# Patient Record
Sex: FEMALE | Race: WHITE | NOT HISPANIC OR LATINO | Employment: FULL TIME | URBAN - METROPOLITAN AREA
[De-identification: names, ages, dates, MRNs, and addresses within clinical notes are randomized per-mention and may not be internally consistent; named-entity substitution may affect disease eponyms.]

---

## 2023-03-07 LAB
CHLAMYDIA TRACH., AMPLIFIED: NEGATIVE
N. GONORRHEA, AMPLIFIED: NEGATIVE

## 2023-03-08 LAB — URINE CULTURE: NO GROWTH

## 2023-04-11 LAB
ERYTHROCYTE DISTRIBUTION WIDTH (RATIO) BY AUTOMATED COUNT: 13.4 % (ref 11.5–14.5)
ERYTHROCYTE MEAN CORPUSCULAR HEMOGLOBIN CONCENTRATION (G/DL) BY AUTOMATED: 32.6 G/DL (ref 32–36)
ERYTHROCYTE MEAN CORPUSCULAR VOLUME (FL) BY AUTOMATED COUNT: 91 FL (ref 80–100)
ERYTHROCYTES (10*6/UL) IN BLOOD BY AUTOMATED COUNT: 4.12 X10E12/L (ref 4–5.2)
HEMATOCRIT (%) IN BLOOD BY AUTOMATED COUNT: 37.4 % (ref 36–46)
HEMOGLOBIN (G/DL) IN BLOOD: 12.2 G/DL (ref 12–16)
LEUKOCYTES (10*3/UL) IN BLOOD BY AUTOMATED COUNT: 9.8 X10E9/L (ref 4.4–11.3)
PLATELETS (10*3/UL) IN BLOOD AUTOMATED COUNT: 285 X10E9/L (ref 150–450)
REFLEX ADDED, ANEMIA PANEL: NORMAL

## 2023-04-12 LAB
ABO GROUP (TYPE) IN BLOOD: NORMAL
ANTIBODY SCREEN: NORMAL
ESTIMATED AVERAGE GLUCOSE FOR HBA1C: 105 MG/DL
HEMOGLOBIN A1C/HEMOGLOBIN TOTAL IN BLOOD: 5.3 %
HEPATITIS B VIRUS SURFACE AG PRESENCE IN SERUM: NONREACTIVE
HEPATITIS C VIRUS AB PRESENCE IN SERUM: NONREACTIVE
HIV 1/ 2 AG/AB SCREEN: NONREACTIVE
RH FACTOR: NORMAL
RUBELLA VIRUS IGG AB: POSITIVE
SYPHILIS TOTAL AB: NONREACTIVE

## 2023-04-13 LAB
HEMOGLOBIN A2: 3 %
HEMOGLOBIN A: 96.6 %
HEMOGLOBIN F: 0.4 %
HEMOGLOBIN IDENTIFICATION INTERPRETATION: NORMAL
PATH REVIEW-HGB IDENTIFICATION: NORMAL

## 2023-07-22 LAB
ERYTHROCYTE DISTRIBUTION WIDTH (RATIO) BY AUTOMATED COUNT: 14.2 % (ref 11.5–14.5)
ERYTHROCYTE MEAN CORPUSCULAR HEMOGLOBIN CONCENTRATION (G/DL) BY AUTOMATED: 31.3 G/DL (ref 32–36)
ERYTHROCYTE MEAN CORPUSCULAR VOLUME (FL) BY AUTOMATED COUNT: 97 FL (ref 80–100)
ERYTHROCYTES (10*6/UL) IN BLOOD BY AUTOMATED COUNT: 3.65 X10E12/L (ref 4–5.2)
HEMATOCRIT (%) IN BLOOD BY AUTOMATED COUNT: 35.5 % (ref 36–46)
HEMOGLOBIN (G/DL) IN BLOOD: 11.1 G/DL (ref 12–16)
LEUKOCYTES (10*3/UL) IN BLOOD BY AUTOMATED COUNT: 11.6 X10E9/L (ref 4.4–11.3)
NRBC (PER 100 WBCS) BY AUTOMATED COUNT: 0 /100 WBC (ref 0–0)
PLATELETS (10*3/UL) IN BLOOD AUTOMATED COUNT: 271 X10E9/L (ref 150–450)
REFLEX ADDED, ANEMIA PANEL: ABNORMAL

## 2023-07-23 LAB — GLUCOSE, 1 HR SCREEN, PREG: 122 MG/DL

## 2023-08-24 PROBLEM — N92.1 BREAKTHROUGH BLEEDING ON NEXPLANON: Status: ACTIVE | Noted: 2023-08-24

## 2023-08-24 PROBLEM — Z97.5 BREAKTHROUGH BLEEDING ON NEXPLANON: Status: ACTIVE | Noted: 2023-08-24

## 2023-08-24 PROBLEM — L50.1 URTICARIA, IDIOPATHIC: Status: ACTIVE | Noted: 2023-08-24

## 2023-08-24 PROBLEM — F32.A ANXIETY AND DEPRESSION: Status: ACTIVE | Noted: 2023-08-24

## 2023-08-24 PROBLEM — M94.0 COSTOCHONDRITIS, ACUTE: Status: ACTIVE | Noted: 2023-08-24

## 2023-08-24 PROBLEM — O21.9 NAUSEA AND VOMITING IN PREGNANCY (HHS-HCC): Status: ACTIVE | Noted: 2023-08-24

## 2023-08-24 PROBLEM — F41.9 ANXIETY AND DEPRESSION: Status: ACTIVE | Noted: 2023-08-24

## 2023-08-24 PROBLEM — K21.9 GERD (GASTROESOPHAGEAL REFLUX DISEASE): Status: ACTIVE | Noted: 2023-08-24

## 2023-08-24 PROBLEM — R30.0 DYSURIA: Status: ACTIVE | Noted: 2023-08-24

## 2023-08-24 RX ORDER — ONDANSETRON 4 MG/1
4 TABLET, ORALLY DISINTEGRATING ORAL EVERY 6 HOURS PRN
COMMUNITY
Start: 2023-03-16 | End: 2023-10-04

## 2023-08-24 RX ORDER — DOXYLAMINE SUCCINATE AND PYRIDOXINE HYDROCHLORIDE, DELAYED RELEASE TABLETS 10 MG/10 MG 10; 10 MG/1; MG/1
2 TABLET, DELAYED RELEASE ORAL NIGHTLY
COMMUNITY
Start: 2023-04-07 | End: 2023-10-25 | Stop reason: WASHOUT

## 2023-08-24 RX ORDER — BENZONATATE 100 MG/1
100 CAPSULE ORAL 3 TIMES DAILY PRN
COMMUNITY
Start: 2022-05-04 | End: 2023-10-04

## 2023-08-24 RX ORDER — METHYLPREDNISOLONE 4 MG/1
4 TABLET ORAL
COMMUNITY
Start: 2022-05-04 | End: 2023-10-04

## 2023-08-24 RX ORDER — VIT C/E/ZN/COPPR/LUTEIN/ZEAXAN 250MG-90MG
25 CAPSULE ORAL
COMMUNITY
End: 2023-10-25 | Stop reason: WASHOUT

## 2023-08-24 RX ORDER — ZINC GLUCONATE 50 MG
50 TABLET ORAL
COMMUNITY
End: 2023-10-04

## 2023-08-24 RX ORDER — NORETHINDRONE ACETATE AND ETHINYL ESTRADIOL AND FERROUS FUMARATE 1MG-20(24)
1 KIT ORAL DAILY
COMMUNITY
Start: 2021-06-15 | End: 2023-10-04

## 2023-08-24 RX ORDER — CALCIUM CARBONATE 300MG(750)
400 TABLET,CHEWABLE ORAL
COMMUNITY
End: 2023-10-25 | Stop reason: WASHOUT

## 2023-08-25 PROBLEM — G43.909 MIGRAINE HEADACHE: Status: ACTIVE | Noted: 2023-08-25

## 2023-09-27 LAB — GROUP B STREP SCREEN: NORMAL

## 2023-10-04 ENCOUNTER — ROUTINE PRENATAL (OUTPATIENT)
Dept: OBSTETRICS AND GYNECOLOGY | Facility: CLINIC | Age: 28
End: 2023-10-04
Payer: COMMERCIAL

## 2023-10-04 VITALS — WEIGHT: 219 LBS | BODY MASS INDEX: 40.06 KG/M2 | DIASTOLIC BLOOD PRESSURE: 80 MMHG | SYSTOLIC BLOOD PRESSURE: 102 MMHG

## 2023-10-04 DIAGNOSIS — Z34.03 ENCOUNTER FOR SUPERVISION OF NORMAL FIRST PREGNANCY IN THIRD TRIMESTER (HHS-HCC): Primary | ICD-10-CM

## 2023-10-04 PROCEDURE — 0501F PRENATAL FLOW SHEET: CPT

## 2023-10-04 ASSESSMENT — ENCOUNTER SYMPTOMS
PSYCHIATRIC NEGATIVE: 0
ALLERGIC/IMMUNOLOGIC NEGATIVE: 0
ENDOCRINE NEGATIVE: 0
CONSTITUTIONAL NEGATIVE: 0
CARDIOVASCULAR NEGATIVE: 0
RESPIRATORY NEGATIVE: 0
GASTROINTESTINAL NEGATIVE: 0
HEMATOLOGIC/LYMPHATIC NEGATIVE: 0
NEUROLOGICAL NEGATIVE: 0
EYES NEGATIVE: 0
MUSCULOSKELETAL NEGATIVE: 0

## 2023-10-04 NOTE — PROGRESS NOTES
Reports overall feeling well; no concerns today.  Reviewed GBS negative result.  Discussed self-induction labor techniques, including dates, red raspberry leaf tea, intercourse, and ambulation.  Discussed PP self-care.  Planning to breastfeed.  Labor warning s/sx reviewed; pt. has emergency answering service phone line number and is aware of when to call.    F/U in 1 week.

## 2023-10-10 ENCOUNTER — ROUTINE PRENATAL (OUTPATIENT)
Dept: OBSTETRICS AND GYNECOLOGY | Facility: CLINIC | Age: 28
End: 2023-10-10
Payer: COMMERCIAL

## 2023-10-10 VITALS — SYSTOLIC BLOOD PRESSURE: 120 MMHG | BODY MASS INDEX: 40.42 KG/M2 | WEIGHT: 221 LBS | DIASTOLIC BLOOD PRESSURE: 78 MMHG

## 2023-10-10 DIAGNOSIS — Z34.93 NORMAL PREGNANCY, THIRD TRIMESTER (HHS-HCC): ICD-10-CM

## 2023-10-10 DIAGNOSIS — Z3A.38 38 WEEKS GESTATION OF PREGNANCY (HHS-HCC): Primary | ICD-10-CM

## 2023-10-10 PROCEDURE — 0501F PRENATAL FLOW SHEET: CPT | Performed by: ADVANCED PRACTICE MIDWIFE

## 2023-10-10 NOTE — PROGRESS NOTES
Return OB visit    S: Rosalva Pineda is a 27 y.o.  at 38w0d with a working estimated date of delivery of 10/24/2023, by Ultrasound who presents for a routine prenatal visit. She denies vaginal bleeding, abdominal pain, leakage of fluid.     Concerns today: Patient having cramping,back pain,stomach tightness.    Ginette Alva MA      O: See prenatal flow sheet    A/P:  Reviewed labor signs, warning signs, and when to call midwife  Follow up in 1 weeks for a routine prenatal visit

## 2023-10-11 ENCOUNTER — APPOINTMENT (OUTPATIENT)
Dept: OBSTETRICS AND GYNECOLOGY | Facility: CLINIC | Age: 28
End: 2023-10-11
Payer: COMMERCIAL

## 2023-10-18 ENCOUNTER — ANESTHESIA EVENT (OUTPATIENT)
Dept: OBSTETRICS AND GYNECOLOGY | Facility: HOSPITAL | Age: 28
End: 2023-10-18
Payer: COMMERCIAL

## 2023-10-18 ENCOUNTER — PREP FOR PROCEDURE (OUTPATIENT)
Dept: OBSTETRICS AND GYNECOLOGY | Facility: HOSPITAL | Age: 28
End: 2023-10-18

## 2023-10-18 ENCOUNTER — ROUTINE PRENATAL (OUTPATIENT)
Dept: OBSTETRICS AND GYNECOLOGY | Facility: CLINIC | Age: 28
End: 2023-10-18
Payer: COMMERCIAL

## 2023-10-18 ENCOUNTER — HOSPITAL ENCOUNTER (INPATIENT)
Facility: HOSPITAL | Age: 28
LOS: 4 days | Discharge: HOME | End: 2023-10-22
Attending: OBSTETRICS & GYNECOLOGY | Admitting: OBSTETRICS & GYNECOLOGY
Payer: COMMERCIAL

## 2023-10-18 ENCOUNTER — ANESTHESIA (OUTPATIENT)
Dept: OBSTETRICS AND GYNECOLOGY | Facility: HOSPITAL | Age: 28
End: 2023-10-18
Payer: COMMERCIAL

## 2023-10-18 VITALS
SYSTOLIC BLOOD PRESSURE: 122 MMHG | WEIGHT: 220.25 LBS | DIASTOLIC BLOOD PRESSURE: 90 MMHG | BODY MASS INDEX: 40.28 KG/M2

## 2023-10-18 DIAGNOSIS — O16.3: Primary | ICD-10-CM

## 2023-10-18 DIAGNOSIS — R52 POSTPARTUM PAIN (HHS-HCC): ICD-10-CM

## 2023-10-18 DIAGNOSIS — F41.9 ANXIETY AND DEPRESSION: ICD-10-CM

## 2023-10-18 DIAGNOSIS — F32.A ANXIETY AND DEPRESSION: ICD-10-CM

## 2023-10-18 PROBLEM — R30.0 DYSURIA: Status: RESOLVED | Noted: 2023-08-24 | Resolved: 2023-10-18

## 2023-10-18 PROBLEM — Z34.93 PRENATAL CARE IN THIRD TRIMESTER (HHS-HCC): Status: ACTIVE | Noted: 2023-10-18

## 2023-10-18 PROBLEM — Z97.5 BREAKTHROUGH BLEEDING ON NEXPLANON: Status: RESOLVED | Noted: 2023-08-24 | Resolved: 2023-10-18

## 2023-10-18 PROBLEM — L50.1 URTICARIA, IDIOPATHIC: Status: RESOLVED | Noted: 2023-08-24 | Resolved: 2023-10-18

## 2023-10-18 PROBLEM — N92.1 BREAKTHROUGH BLEEDING ON NEXPLANON: Status: RESOLVED | Noted: 2023-08-24 | Resolved: 2023-10-18

## 2023-10-18 PROBLEM — Z3A.39 39 WEEKS GESTATION OF PREGNANCY (HHS-HCC): Status: ACTIVE | Noted: 2023-10-18

## 2023-10-18 LAB
ABO GROUP (TYPE) IN BLOOD: NORMAL
ALBUMIN SERPL BCP-MCNC: 3.5 G/DL (ref 3.4–5)
ALP SERPL-CCNC: 148 U/L (ref 33–110)
ALT SERPL W P-5'-P-CCNC: 12 U/L (ref 7–45)
ANION GAP SERPL CALC-SCNC: 13 MMOL/L (ref 10–20)
ANTIBODY SCREEN: NORMAL
AST SERPL W P-5'-P-CCNC: 16 U/L (ref 9–39)
BILIRUB SERPL-MCNC: 0.4 MG/DL (ref 0–1.2)
BUN SERPL-MCNC: 7 MG/DL (ref 6–23)
CALCIUM SERPL-MCNC: 9.1 MG/DL (ref 8.6–10.3)
CHLORIDE SERPL-SCNC: 103 MMOL/L (ref 98–107)
CO2 SERPL-SCNC: 23 MMOL/L (ref 21–32)
CREAT SERPL-MCNC: 0.43 MG/DL (ref 0.5–1.05)
CREAT UR-MCNC: 71.3 MG/DL (ref 20–320)
ERYTHROCYTE [DISTWIDTH] IN BLOOD BY AUTOMATED COUNT: 13.4 % (ref 11.5–14.5)
GFR SERPL CREATININE-BSD FRML MDRD: >90 ML/MIN/1.73M*2
GLUCOSE SERPL-MCNC: 109 MG/DL (ref 74–99)
HCT VFR BLD AUTO: 36.4 % (ref 36–46)
HGB BLD-MCNC: 12.2 G/DL (ref 12–16)
MCH RBC QN AUTO: 30.3 PG (ref 26–34)
MCHC RBC AUTO-ENTMCNC: 33.5 G/DL (ref 32–36)
MCV RBC AUTO: 90 FL (ref 80–100)
NRBC BLD-RTO: 0 /100 WBCS (ref 0–0)
PLATELET # BLD AUTO: 270 X10*3/UL (ref 150–450)
PMV BLD AUTO: 13 FL (ref 7.5–11.5)
POTASSIUM SERPL-SCNC: 3.7 MMOL/L (ref 3.5–5.3)
PROT SERPL-MCNC: 6.7 G/DL (ref 6.4–8.2)
PROT UR-ACNC: 16 MG/DL (ref 5–24)
PROT/CREAT UR: 0.22 MG/MG CREAT (ref 0–0.17)
RBC # BLD AUTO: 4.03 X10*6/UL (ref 4–5.2)
RH FACTOR (ANTIGEN D): NORMAL
SODIUM SERPL-SCNC: 135 MMOL/L (ref 136–145)
WBC # BLD AUTO: 12.5 X10*3/UL (ref 4.4–11.3)

## 2023-10-18 PROCEDURE — 2500000004 HC RX 250 GENERAL PHARMACY W/ HCPCS (ALT 636 FOR OP/ED): Performed by: ADVANCED PRACTICE MIDWIFE

## 2023-10-18 PROCEDURE — 84156 ASSAY OF PROTEIN URINE: CPT | Performed by: ADVANCED PRACTICE MIDWIFE

## 2023-10-18 PROCEDURE — 86901 BLOOD TYPING SEROLOGIC RH(D): CPT | Performed by: ADVANCED PRACTICE MIDWIFE

## 2023-10-18 PROCEDURE — 1120000001 HC OB PRIVATE ROOM DAILY

## 2023-10-18 PROCEDURE — 36415 COLL VENOUS BLD VENIPUNCTURE: CPT | Performed by: ADVANCED PRACTICE MIDWIFE

## 2023-10-18 PROCEDURE — 86920 COMPATIBILITY TEST SPIN: CPT

## 2023-10-18 PROCEDURE — 0501F PRENATAL FLOW SHEET: CPT

## 2023-10-18 PROCEDURE — 80053 COMPREHEN METABOLIC PANEL: CPT | Performed by: ADVANCED PRACTICE MIDWIFE

## 2023-10-18 PROCEDURE — 86780 TREPONEMA PALLIDUM: CPT | Performed by: ADVANCED PRACTICE MIDWIFE

## 2023-10-18 PROCEDURE — 85027 COMPLETE CBC AUTOMATED: CPT | Performed by: ADVANCED PRACTICE MIDWIFE

## 2023-10-18 PROCEDURE — 7210000002 HC LABOR PER HOUR

## 2023-10-18 PROCEDURE — 86780 TREPONEMA PALLIDUM: CPT | Mod: STJLAB | Performed by: ADVANCED PRACTICE MIDWIFE

## 2023-10-18 RX ORDER — NIFEDIPINE 10 MG/1
10 CAPSULE ORAL ONCE AS NEEDED
Status: CANCELLED | OUTPATIENT
Start: 2023-10-18

## 2023-10-18 RX ORDER — SODIUM CHLORIDE, SODIUM LACTATE, POTASSIUM CHLORIDE, CALCIUM CHLORIDE 600; 310; 30; 20 MG/100ML; MG/100ML; MG/100ML; MG/100ML
125 INJECTION, SOLUTION INTRAVENOUS CONTINUOUS
Status: CANCELLED | OUTPATIENT
Start: 2023-10-18

## 2023-10-18 RX ORDER — OXYTOCIN 10 [USP'U]/ML
10 INJECTION, SOLUTION INTRAMUSCULAR; INTRAVENOUS ONCE AS NEEDED
Status: DISCONTINUED | OUTPATIENT
Start: 2023-10-18 | End: 2023-10-19

## 2023-10-18 RX ORDER — CARBOPROST TROMETHAMINE 250 UG/ML
250 INJECTION, SOLUTION INTRAMUSCULAR ONCE AS NEEDED
Status: DISCONTINUED | OUTPATIENT
Start: 2023-10-18 | End: 2023-10-19

## 2023-10-18 RX ORDER — METHYLERGONOVINE MALEATE 0.2 MG/ML
0.2 INJECTION INTRAVENOUS ONCE AS NEEDED
Status: CANCELLED | OUTPATIENT
Start: 2023-10-18

## 2023-10-18 RX ORDER — LABETALOL HYDROCHLORIDE 5 MG/ML
20 INJECTION, SOLUTION INTRAVENOUS ONCE AS NEEDED
Status: DISCONTINUED | OUTPATIENT
Start: 2023-10-18 | End: 2023-10-19

## 2023-10-18 RX ORDER — TRANEXAMIC ACID 100 MG/ML
1000 INJECTION, SOLUTION INTRAVENOUS ONCE AS NEEDED
Status: DISCONTINUED | OUTPATIENT
Start: 2023-10-18 | End: 2023-10-19

## 2023-10-18 RX ORDER — TERBUTALINE SULFATE 1 MG/ML
0.25 INJECTION SUBCUTANEOUS ONCE AS NEEDED
Status: DISCONTINUED | OUTPATIENT
Start: 2023-10-18 | End: 2023-10-19

## 2023-10-18 RX ORDER — BUTORPHANOL TARTRATE 1 MG/ML
1 INJECTION INTRAMUSCULAR; INTRAVENOUS EVERY 10 MIN PRN
Status: CANCELLED | OUTPATIENT
Start: 2023-10-18

## 2023-10-18 RX ORDER — LORAZEPAM 2 MG/ML
0.5 INJECTION INTRAMUSCULAR ONCE
Status: COMPLETED | OUTPATIENT
Start: 2023-10-18 | End: 2023-10-18

## 2023-10-18 RX ORDER — CARBOPROST TROMETHAMINE 250 UG/ML
250 INJECTION, SOLUTION INTRAMUSCULAR ONCE AS NEEDED
Status: CANCELLED | OUTPATIENT
Start: 2023-10-18

## 2023-10-18 RX ORDER — BUTORPHANOL TARTRATE 1 MG/ML
1 INJECTION INTRAMUSCULAR; INTRAVENOUS EVERY 10 MIN PRN
Status: DISCONTINUED | OUTPATIENT
Start: 2023-10-18 | End: 2023-10-19

## 2023-10-18 RX ORDER — LIDOCAINE HYDROCHLORIDE 10 MG/ML
30 INJECTION INFILTRATION; PERINEURAL ONCE AS NEEDED
Status: DISCONTINUED | OUTPATIENT
Start: 2023-10-18 | End: 2023-10-19

## 2023-10-18 RX ORDER — METOCLOPRAMIDE HYDROCHLORIDE 5 MG/ML
10 INJECTION INTRAMUSCULAR; INTRAVENOUS EVERY 6 HOURS PRN
Status: DISCONTINUED | OUTPATIENT
Start: 2023-10-18 | End: 2023-10-19

## 2023-10-18 RX ORDER — ONDANSETRON HYDROCHLORIDE 2 MG/ML
4 INJECTION, SOLUTION INTRAVENOUS EVERY 6 HOURS PRN
Status: CANCELLED | OUTPATIENT
Start: 2023-10-18

## 2023-10-18 RX ORDER — SODIUM CHLORIDE, SODIUM LACTATE, POTASSIUM CHLORIDE, CALCIUM CHLORIDE 600; 310; 30; 20 MG/100ML; MG/100ML; MG/100ML; MG/100ML
125 INJECTION, SOLUTION INTRAVENOUS CONTINUOUS
Status: DISCONTINUED | OUTPATIENT
Start: 2023-10-18 | End: 2023-10-19

## 2023-10-18 RX ORDER — OXYTOCIN/0.9 % SODIUM CHLORIDE 30/500 ML
60 PLASTIC BAG, INJECTION (ML) INTRAVENOUS ONCE AS NEEDED
Status: DISCONTINUED | OUTPATIENT
Start: 2023-10-18 | End: 2023-10-19

## 2023-10-18 RX ORDER — ONDANSETRON HYDROCHLORIDE 2 MG/ML
4 INJECTION, SOLUTION INTRAVENOUS EVERY 6 HOURS PRN
Status: DISCONTINUED | OUTPATIENT
Start: 2023-10-18 | End: 2023-10-19

## 2023-10-18 RX ORDER — MISOPROSTOL 200 UG/1
800 TABLET ORAL ONCE AS NEEDED
Status: CANCELLED | OUTPATIENT
Start: 2023-10-18

## 2023-10-18 RX ORDER — LIDOCAINE HYDROCHLORIDE 10 MG/ML
30 INJECTION INFILTRATION; PERINEURAL ONCE AS NEEDED
Status: CANCELLED | OUTPATIENT
Start: 2023-10-18

## 2023-10-18 RX ORDER — OXYTOCIN 10 [USP'U]/ML
10 INJECTION, SOLUTION INTRAMUSCULAR; INTRAVENOUS ONCE AS NEEDED
Status: CANCELLED | OUTPATIENT
Start: 2023-10-18

## 2023-10-18 RX ORDER — TRANEXAMIC ACID 100 MG/ML
1000 INJECTION, SOLUTION INTRAVENOUS ONCE AS NEEDED
Status: CANCELLED | OUTPATIENT
Start: 2023-10-18

## 2023-10-18 RX ORDER — OXYTOCIN/0.9 % SODIUM CHLORIDE 30/500 ML
60 PLASTIC BAG, INJECTION (ML) INTRAVENOUS ONCE AS NEEDED
Status: CANCELLED | OUTPATIENT
Start: 2023-10-18

## 2023-10-18 RX ORDER — LABETALOL HYDROCHLORIDE 5 MG/ML
20 INJECTION, SOLUTION INTRAVENOUS ONCE AS NEEDED
Status: CANCELLED | OUTPATIENT
Start: 2023-10-18

## 2023-10-18 RX ORDER — NIFEDIPINE 10 MG/1
10 CAPSULE ORAL ONCE AS NEEDED
Status: DISCONTINUED | OUTPATIENT
Start: 2023-10-18 | End: 2023-10-19

## 2023-10-18 RX ORDER — HYDRALAZINE HYDROCHLORIDE 20 MG/ML
5 INJECTION INTRAMUSCULAR; INTRAVENOUS ONCE AS NEEDED
Status: DISCONTINUED | OUTPATIENT
Start: 2023-10-18 | End: 2023-10-19

## 2023-10-18 RX ORDER — OXYTOCIN/0.9 % SODIUM CHLORIDE 30/500 ML
2-30 PLASTIC BAG, INJECTION (ML) INTRAVENOUS CONTINUOUS
Status: DISCONTINUED | OUTPATIENT
Start: 2023-10-18 | End: 2023-10-19

## 2023-10-18 RX ORDER — METOCLOPRAMIDE 10 MG/1
10 TABLET ORAL EVERY 6 HOURS PRN
Status: DISCONTINUED | OUTPATIENT
Start: 2023-10-18 | End: 2023-10-19

## 2023-10-18 RX ORDER — ONDANSETRON 4 MG/1
4 TABLET, FILM COATED ORAL EVERY 6 HOURS PRN
Status: DISCONTINUED | OUTPATIENT
Start: 2023-10-18 | End: 2023-10-19

## 2023-10-18 RX ORDER — METOCLOPRAMIDE HYDROCHLORIDE 5 MG/ML
10 INJECTION INTRAMUSCULAR; INTRAVENOUS EVERY 6 HOURS PRN
Status: CANCELLED | OUTPATIENT
Start: 2023-10-18

## 2023-10-18 RX ORDER — NALBUPHINE HYDROCHLORIDE 10 MG/ML
10 INJECTION, SOLUTION INTRAMUSCULAR; INTRAVENOUS; SUBCUTANEOUS
Status: DISCONTINUED | OUTPATIENT
Start: 2023-10-18 | End: 2023-10-19

## 2023-10-18 RX ORDER — MISOPROSTOL 200 UG/1
800 TABLET ORAL ONCE AS NEEDED
Status: DISCONTINUED | OUTPATIENT
Start: 2023-10-18 | End: 2023-10-19

## 2023-10-18 RX ORDER — TERBUTALINE SULFATE 1 MG/ML
0.25 INJECTION SUBCUTANEOUS ONCE AS NEEDED
Status: CANCELLED | OUTPATIENT
Start: 2023-10-18

## 2023-10-18 RX ORDER — LOPERAMIDE HYDROCHLORIDE 2 MG/1
4 CAPSULE ORAL EVERY 2 HOUR PRN
Status: CANCELLED | OUTPATIENT
Start: 2023-10-18

## 2023-10-18 RX ORDER — MORPHINE SULFATE 2 MG/ML
2 INJECTION, SOLUTION INTRAMUSCULAR; INTRAVENOUS EVERY 4 HOURS PRN
Status: DISCONTINUED | OUTPATIENT
Start: 2023-10-18 | End: 2023-10-19

## 2023-10-18 RX ORDER — ONDANSETRON 4 MG/1
4 TABLET, FILM COATED ORAL EVERY 6 HOURS PRN
Status: CANCELLED | OUTPATIENT
Start: 2023-10-18

## 2023-10-18 RX ORDER — NALBUPHINE HYDROCHLORIDE 10 MG/ML
10 INJECTION, SOLUTION INTRAMUSCULAR; INTRAVENOUS; SUBCUTANEOUS
Status: CANCELLED | OUTPATIENT
Start: 2023-10-18

## 2023-10-18 RX ORDER — METHYLERGONOVINE MALEATE 0.2 MG/ML
0.2 INJECTION INTRAVENOUS ONCE AS NEEDED
Status: DISCONTINUED | OUTPATIENT
Start: 2023-10-18 | End: 2023-10-19

## 2023-10-18 RX ORDER — HYDRALAZINE HYDROCHLORIDE 20 MG/ML
5 INJECTION INTRAMUSCULAR; INTRAVENOUS ONCE AS NEEDED
Status: CANCELLED | OUTPATIENT
Start: 2023-10-18

## 2023-10-18 RX ORDER — METOCLOPRAMIDE 10 MG/1
10 TABLET ORAL EVERY 6 HOURS PRN
Status: CANCELLED | OUTPATIENT
Start: 2023-10-18

## 2023-10-18 RX ORDER — LOPERAMIDE HYDROCHLORIDE 2 MG/1
4 CAPSULE ORAL EVERY 2 HOUR PRN
Status: DISCONTINUED | OUTPATIENT
Start: 2023-10-18 | End: 2023-10-19

## 2023-10-18 RX ADMIN — LORAZEPAM 0.5 MG: 2 INJECTION INTRAMUSCULAR; INTRAVENOUS at 04:20

## 2023-10-18 RX ADMIN — SODIUM CHLORIDE, POTASSIUM CHLORIDE, SODIUM LACTATE AND CALCIUM CHLORIDE 125 ML/HR: 600; 310; 30; 20 INJECTION, SOLUTION INTRAVENOUS at 16:56

## 2023-10-18 RX ADMIN — Medication 2 MILLI-UNITS/MIN: at 17:00

## 2023-10-18 SDOH — ECONOMIC STABILITY: FOOD INSECURITY: WITHIN THE PAST 12 MONTHS, YOU WORRIED THAT YOUR FOOD WOULD RUN OUT BEFORE YOU GOT MONEY TO BUY MORE.: NEVER TRUE

## 2023-10-18 SDOH — SOCIAL STABILITY: SOCIAL INSECURITY: WITHIN THE LAST YEAR, HAVE YOU BEEN HUMILIATED OR EMOTIONALLY ABUSED IN OTHER WAYS BY YOUR PARTNER OR EX-PARTNER?: NO

## 2023-10-18 SDOH — HEALTH STABILITY: PHYSICAL HEALTH: ON AVERAGE, HOW MANY DAYS PER WEEK DO YOU ENGAGE IN MODERATE TO STRENUOUS EXERCISE (LIKE A BRISK WALK)?: 5 DAYS

## 2023-10-18 SDOH — SOCIAL STABILITY: SOCIAL INSECURITY: HAVE YOU HAD THOUGHTS OF HARMING ANYONE ELSE?: NO

## 2023-10-18 SDOH — SOCIAL STABILITY: SOCIAL INSECURITY
WITHIN THE LAST YEAR, HAVE TO BEEN RAPED OR FORCED TO HAVE ANY KIND OF SEXUAL ACTIVITY BY YOUR PARTNER OR EX-PARTNER?: NO

## 2023-10-18 SDOH — SOCIAL STABILITY: SOCIAL INSECURITY
WITHIN THE LAST YEAR, HAVE YOU BEEN KICKED, HIT, SLAPPED, OR OTHERWISE PHYSICALLY HURT BY YOUR PARTNER OR EX-PARTNER?: NO

## 2023-10-18 SDOH — HEALTH STABILITY: MENTAL HEALTH
STRESS IS WHEN SOMEONE FEELS TENSE, NERVOUS, ANXIOUS, OR CAN'T SLEEP AT NIGHT BECAUSE THEIR MIND IS TROUBLED. HOW STRESSED ARE YOU?: NOT AT ALL

## 2023-10-18 SDOH — ECONOMIC STABILITY: FOOD INSECURITY: WITHIN THE PAST 12 MONTHS, THE FOOD YOU BOUGHT JUST DIDN'T LAST AND YOU DIDN'T HAVE MONEY TO GET MORE.: NEVER TRUE

## 2023-10-18 SDOH — SOCIAL STABILITY: SOCIAL INSECURITY: PHYSICAL ABUSE: DENIES

## 2023-10-18 SDOH — HEALTH STABILITY: MENTAL HEALTH: HAVE YOU USED ANY SUBSTANCES (CANABIS, COCAINE, HEROIN, HALLUCINOGENS, INHALANTS, ETC.) IN THE PAST 12 MONTHS?: NO

## 2023-10-18 SDOH — SOCIAL STABILITY: SOCIAL INSECURITY: ABUSE SCREEN: ADULT

## 2023-10-18 SDOH — SOCIAL STABILITY: SOCIAL NETWORK
DO YOU BELONG TO ANY CLUBS OR ORGANIZATIONS SUCH AS CHURCH GROUPS UNIONS, FRATERNAL OR ATHLETIC GROUPS, OR SCHOOL GROUPS?: NO

## 2023-10-18 SDOH — SOCIAL STABILITY: SOCIAL NETWORK: ARE YOU MARRIED, WIDOWED, DIVORCED, SEPARATED, NEVER MARRIED, OR LIVING WITH A PARTNER?: LIVING WITH PARTNER

## 2023-10-18 SDOH — SOCIAL STABILITY: SOCIAL INSECURITY: VERBAL ABUSE: DENIES

## 2023-10-18 SDOH — SOCIAL STABILITY: SOCIAL INSECURITY: WITHIN THE LAST YEAR, HAVE YOU BEEN AFRAID OF YOUR PARTNER OR EX-PARTNER?: NO

## 2023-10-18 SDOH — HEALTH STABILITY: MENTAL HEALTH: SUICIDAL BEHAVIOR (LIFETIME): NO

## 2023-10-18 SDOH — SOCIAL STABILITY: SOCIAL INSECURITY: DO YOU FEEL ANYONE HAS EXPLOITED OR TAKEN ADVANTAGE OF YOU FINANCIALLY OR OF YOUR PERSONAL PROPERTY?: NO

## 2023-10-18 SDOH — SOCIAL STABILITY: SOCIAL INSECURITY: ARE THERE ANY APPARENT SIGNS OF INJURIES/BEHAVIORS THAT COULD BE RELATED TO ABUSE/NEGLECT?: NO

## 2023-10-18 SDOH — HEALTH STABILITY: MENTAL HEALTH: WERE YOU ABLE TO COMPLETE ALL THE BEHAVIORAL HEALTH SCREENINGS?: YES

## 2023-10-18 SDOH — SOCIAL STABILITY: SOCIAL INSECURITY: DOES ANYONE TRY TO KEEP YOU FROM HAVING/CONTACTING OTHER FRIENDS OR DOING THINGS OUTSIDE YOUR HOME?: NO

## 2023-10-18 SDOH — HEALTH STABILITY: PHYSICAL HEALTH: ON AVERAGE, HOW MANY MINUTES DO YOU ENGAGE IN EXERCISE AT THIS LEVEL?: 30 MIN

## 2023-10-18 SDOH — SOCIAL STABILITY: SOCIAL NETWORK
IN A TYPICAL WEEK, HOW MANY TIMES DO YOU TALK ON THE PHONE WITH FAMILY, FRIENDS, OR NEIGHBORS?: MORE THAN THREE TIMES A WEEK

## 2023-10-18 SDOH — SOCIAL STABILITY: SOCIAL NETWORK: HOW OFTEN DO YOU ATTEND CHURCH OR RELIGIOUS SERVICES?: NEVER

## 2023-10-18 SDOH — SOCIAL STABILITY: SOCIAL NETWORK: HOW OFTEN DO YOU ATTENT MEETINGS OF THE CLUB OR ORGANIZATION YOU BELONG TO?: NEVER

## 2023-10-18 SDOH — SOCIAL STABILITY: SOCIAL NETWORK: HOW OFTEN DO YOU GET TOGETHER WITH FRIENDS OR RELATIVES?: MORE THAN THREE TIMES A WEEK

## 2023-10-18 SDOH — ECONOMIC STABILITY: HOUSING INSECURITY: DO YOU FEEL UNSAFE GOING BACK TO THE PLACE WHERE YOU ARE LIVING?: NO

## 2023-10-18 SDOH — SOCIAL STABILITY: SOCIAL INSECURITY: ARE YOU OR HAVE YOU BEEN THREATENED OR ABUSED PHYSICALLY, EMOTIONALLY, OR SEXUALLY BY ANYONE?: NO

## 2023-10-18 SDOH — HEALTH STABILITY: MENTAL HEALTH: CURRENT SMOKER: 1

## 2023-10-18 SDOH — HEALTH STABILITY: MENTAL HEALTH: NON-SPECIFIC ACTIVE SUICIDAL THOUGHTS (PAST 1 MONTH): NO

## 2023-10-18 SDOH — HEALTH STABILITY: MENTAL HEALTH: WISH TO BE DEAD (PAST 1 MONTH): NO

## 2023-10-18 SDOH — SOCIAL STABILITY: SOCIAL INSECURITY: HAS ANYONE EVER THREATENED TO HURT YOUR FAMILY OR YOUR PETS?: NO

## 2023-10-18 SDOH — HEALTH STABILITY: MENTAL HEALTH: STRENGTHS (MUST CHOOSE TWO): SUPPORT FROM FRIENDS;SUPPORT FROM FAMILY

## 2023-10-18 SDOH — HEALTH STABILITY: MENTAL HEALTH: HAVE YOU USED ANY PRESCRIPTION DRUGS OTHER THAN PRESCRIBED IN THE PAST 12 MONTHS?: NO

## 2023-10-18 ASSESSMENT — PAIN SCALES - GENERAL
PAINLEVEL_OUTOF10: 0 - NO PAIN
PAINLEVEL_OUTOF10: 1
PAINLEVEL_OUTOF10: 0 - NO PAIN
PAINLEVEL_OUTOF10: 0 - NO PAIN
PAINLEVEL_OUTOF10: 3
PAINLEVEL_OUTOF10: 0 - NO PAIN
PAINLEVEL_OUTOF10: 1
PAINLEVEL_OUTOF10: 0 - NO PAIN
PAINLEVEL_OUTOF10: 0 - NO PAIN
PAINLEVEL_OUTOF10: 1
PAINLEVEL_OUTOF10: 1
PAINLEVEL_OUTOF10: 0 - NO PAIN

## 2023-10-18 ASSESSMENT — LIFESTYLE VARIABLES
AUDIT-C TOTAL SCORE: 0
HOW OFTEN DO YOU HAVE A DRINK CONTAINING ALCOHOL: NEVER
HOW MANY STANDARD DRINKS CONTAINING ALCOHOL DO YOU HAVE ON A TYPICAL DAY: PATIENT DOES NOT DRINK
AUDIT-C TOTAL SCORE: 0
SKIP TO QUESTIONS 9-10: 1
HOW OFTEN DO YOU HAVE 6 OR MORE DRINKS ON ONE OCCASION: NEVER

## 2023-10-18 ASSESSMENT — PATIENT HEALTH QUESTIONNAIRE - PHQ9
1. LITTLE INTEREST OR PLEASURE IN DOING THINGS: NOT AT ALL
SUM OF ALL RESPONSES TO PHQ9 QUESTIONS 1 & 2: 0
2. FEELING DOWN, DEPRESSED OR HOPELESS: NOT AT ALL

## 2023-10-18 ASSESSMENT — ACTIVITIES OF DAILY LIVING (ADL)
LACK_OF_TRANSPORTATION: NO
DRESSING YOURSELF: INDEPENDENT
GROOMING: INDEPENDENT
PATIENT'S MEMORY ADEQUATE TO SAFELY COMPLETE DAILY ACTIVITIES?: YES
JUDGMENT_ADEQUATE_SAFELY_COMPLETE_DAILY_ACTIVITIES: YES
HEARING - RIGHT EAR: FUNCTIONAL
TOILETING: INDEPENDENT
BATHING: INDEPENDENT
ADEQUATE_TO_COMPLETE_ADL: YES
HEARING - LEFT EAR: FUNCTIONAL
FEEDING YOURSELF: INDEPENDENT
WALKS IN HOME: INDEPENDENT

## 2023-10-18 NOTE — H&P
Obstetrical Admission History and Physical     Rosalva Pineda is a 27 y.o.  at 39w1d. ENRIKE: 10/24/2023, by Ultrasound. Estimated fetal weight: 8#. She has had prenatal care with DIXON Roger .    Chief Complaint: No chief complaint on file.    Assessment/Plan    A: Term pregnancy 39.1    B/p elevation in office . B/p on admission normal     IOL     GBS negative  P; Pitocin Induction risks and bvenefits reviewed     PET labs     Epidural for pain management     All questions answered    Pregnancy Problems (from 23 to present)       Problem Noted Resolved    Prenatal care in third trimester 10/18/2023 by PHAN Fine No    Overview Addendum 10/18/2023  2:17 PM by PHAN Fine     Anxiety and depression, no meds. -- Wants to be discharged from hospital PP with anti-anxiety medication.  Covid vaccinated; declines flu vaccine.  Normal rr NIPS.  It's a BOY!  Starting BMI 31.  Hx of Asthma, rare use of inhaler.               Admit for inpatient care.      Subjective   Rosalva  has Good fetal movement. Denies vaginal bleeding. Sent from office for b/p 122/90 agrees to IOL         Obstetrical History   OB History    Para Term  AB Living   1 0 0 0 0 0   SAB IAB Ectopic Multiple Live Births   0 0 0 0 0      # Outcome Date GA Lbr Karlo/2nd Weight Sex Delivery Anes PTL Lv   1 Current               Obstetric Comments   [id number] DNP Anxiety and depression  2023 08:28 AM - MT        No meds.   2023 08:34 AM - KD [id number] false     [id number] DNP Covid vaccinated x4 and flu vaccinated.  2023 08:34 AM - KD     [id number] DNP Declines flu vaccination.  2023 04:21 PM - KD     [id number] DNP Hx. of Asthma -- Rare PRN inhaler use.  2023 08:35 AM - KD     [id number] DNP Lactating mother  2023 11:00 AM - CM     [id number] DNP Migraine headache  2023 08:28 AM - MT     [id number] DNP Nausea and vomiting in pregnancy  2023 04:36 PM - CK     [id number] DNP  Nausea in adult  03/20/2023 03:47 PM - MT     [id number] [facility] Pediatrician: Dr. King  09/20/2023 03:38 PM - RM     [id number] [facility] rrNIPS  04/18/2023 09:42 AM - CK        neg carrier screen   04/23/2023 09:08 PM - CK [id number] false     [id number] [facility] Screening for diabetes mellitus  07/11/2023 04:24 PM - BS     [id number] [facility] Starting BMI 31.  03/06/2023 08:35 AM - KD         Past Medical History  Past Medical History:   Diagnosis Date    Anxiety     Depression         Past Surgical History   Past Surgical History:   Procedure Laterality Date    OTHER SURGICAL HISTORY  05/12/2020    Tonsillectomy    TONSILECTOMY, ADENOIDECTOMY, BILATERAL MYRINGOTOMY AND TUBES         Social History  Social History     Tobacco Use    Smoking status: Never    Smokeless tobacco: Never   Substance Use Topics    Alcohol use: Not Currently     Substance and Sexual Activity   Drug Use Never       Allergies  Cefaclor           Objective    Last Vitals  Blood pressure 131/87, pulse (!) 121, temperature 37.1 °C (98.7 °F), temperature source Oral, last menstrual period 12/23/2022, SpO2 96 %.    Physical Examination  GENERAL: Examination reveals a well developed, well nourished and obese, gravid female in no acute distress. She is alert and cooperative.  HEENT: conjunctiva clear.   LUNGS: clear to auscultation bilaterally.  HEART: tachycardia  ABDOMEN: soft, gravid, nontender, nondistended, no abnormal masses, no epigastric pain.  PELVIC:       Vagina: deferred   EXTREMITIES: no redness or tenderness in the calves or thighs, no edema. Good muscle tone with no atrophy.  SKIN: normal coloration and turgor, no rashes.  NEUROLOGICAL: reflexes are DTRs normal and symmetrical. Normal sensation in all extremities.  PSYCHOLOGICAL: mood normal     Lab Review  Labs in chart were reviewed.

## 2023-10-18 NOTE — CARE PLAN
The patient's goals for the shift include deliver baby vaginally    The clinical goals for the shift include have a healthy baby    Over the shift, the patient did not make progress toward the following goals. Barriers to progression include baby has not been delivered yet. Recommendations to address these barriers include induction of labor with oxytocin.    Problem: Vaginal Birth or  Section  Goal: Fetal and maternal status remain reassuring during the birth process  Outcome: Progressing  Goal: Demonstrates labor coping techniques through delivery  Outcome: Progressing

## 2023-10-18 NOTE — PROGRESS NOTES
Rosalva Pineda is a 27 y.o.  at 39w1d with a working estimated date of delivery of 10/24/2023, by Ultrasound who presents for a routine prenatal visit.    Medical Problems       Problem List       Anxiety and depression    Costochondritis, acute    GERD (gastroesophageal reflux disease)    Nausea and vomiting in pregnancy    Migraine headache    Prenatal care in third trimester    Overview Signed 10/18/2023  1:39 PM by PHAN Soto     Anxiety and depression, no meds.  Covid vaccinated; declines flu vaccine.  Normal rr NIPS.  It's a BOY!  Starting BMI 31.  Hx of Asthma, rare use of inhaler.  Elevated /90 10/18/23.               Patient reports overall feeling well.  Significant bilateral lower extremity edema, pitting 2+.  Patient wearing compression stockings and just came to the office from work.   BP elevated in office today 122/90; patient denies any HA, RUQ pain, or visual disturbances -- Patient advised to go to L&D, likely for IOL given elevated BP reading in office today and her current GA.  The need, benefits, and risks of induction were discussed with the patient. Induction methods were reviewed including cervical ripening, balloon placement, Cytotec, and Pitocin. Reasonable alternatives to induction were discussed as well as the risks of remaining pregnant. All patient questions were answered. The patient accepts the risks of induction and desires to proceed with induction.    VARGHESE Gr CNM on call, notified.    PHAN Soto

## 2023-10-18 NOTE — CARE PLAN
The patient's goals for the shift include deliver baby vaginally    The clinical goals for the shift include have a healthy baby      Problem: Vaginal Birth or  Section  Goal: Fetal and maternal status remain reassuring during the birth process  Outcome: Progressing     Problem: Vaginal Birth or  Section  Goal: Demonstrates labor coping techniques through delivery  Outcome: Progressing     Problem: Vaginal Birth or  Section  Goal: Minimal s/sx of HDP and BP<160/110  Outcome: Progressing     Problem: Safety - Adult  Goal: Free from fall injury  Outcome: Progressing

## 2023-10-19 LAB
ABO GROUP (TYPE) IN BLOOD: NORMAL
BASE EXCESS BLDCOA CALC-SCNC: 0 MMOL/L (ref -10.8–-0.5)
BASE EXCESS BLDCOV CALC-SCNC: -2.2 MMOL/L (ref -8.1–-0.5)
BODY TEMPERATURE: ABNORMAL
BODY TEMPERATURE: ABNORMAL
HCO3 BLDCOA-SCNC: 27.2 MMOL/L (ref 15–29)
HCO3 BLDCOV-SCNC: 22.4 MMOL/L (ref 16–26)
INHALED O2 CONCENTRATION: 21 %
INHALED O2 CONCENTRATION: 21 %
OXYHGB MFR BLDCOA: 19.1 % (ref 94–98)
OXYHGB MFR BLDCOV: 52.4 % (ref 94–98)
PCO2 BLDCOA: 54 MM HG (ref 31–75)
PCO2 BLDCOV: 37 MM HG (ref 22–53)
PH BLDCOA: 7.31 PH (ref 7.08–7.39)
PH BLDCOV: 7.39 PH (ref 7.19–7.47)
PO2 BLDCOA: 15 MM HG (ref 5–31)
PO2 BLDCOV: 26 MM HG (ref 13–37)
RH FACTOR (ANTIGEN D): NORMAL
SAO2 % BLDCOA: 19 % (ref 3–69)
SAO2 % BLDCOV: 54 % (ref 16–84)
T PALLIDUM AB SER QL: NONREACTIVE

## 2023-10-19 PROCEDURE — 7220000003 HC JADA OB SPECIAL CARE, 4 HOURS

## 2023-10-19 PROCEDURE — 3700000001 HC GENERAL ANESTHESIA TIME - INITIAL BASE CHARGE: Performed by: OBSTETRICS & GYNECOLOGY

## 2023-10-19 PROCEDURE — 2500000004 HC RX 250 GENERAL PHARMACY W/ HCPCS (ALT 636 FOR OP/ED): Performed by: ADVANCED PRACTICE MIDWIFE

## 2023-10-19 PROCEDURE — 59514 CESAREAN DELIVERY ONLY: CPT | Performed by: OBSTETRICS & GYNECOLOGY

## 2023-10-19 PROCEDURE — 2500000005 HC RX 250 GENERAL PHARMACY W/O HCPCS: Performed by: NURSE ANESTHETIST, CERTIFIED REGISTERED

## 2023-10-19 PROCEDURE — 10H07YZ INSERTION OF OTHER DEVICE INTO PRODUCTS OF CONCEPTION, VIA NATURAL OR ARTIFICIAL OPENING: ICD-10-PCS | Performed by: OBSTETRICS & GYNECOLOGY

## 2023-10-19 PROCEDURE — 82805 BLOOD GASES W/O2 SATURATION: CPT | Performed by: ADVANCED PRACTICE MIDWIFE

## 2023-10-19 PROCEDURE — 59899 UNLISTED PX MAT CARE&DLVR: CPT | Performed by: OBSTETRICS & GYNECOLOGY

## 2023-10-19 PROCEDURE — 0W3R0ZZ CONTROL BLEEDING IN GENITOURINARY TRACT, OPEN APPROACH: ICD-10-PCS | Performed by: OBSTETRICS & GYNECOLOGY

## 2023-10-19 PROCEDURE — 1120000001 HC OB PRIVATE ROOM DAILY

## 2023-10-19 PROCEDURE — 3700000018 HC OB ANESTHESIA C-SECTION: Performed by: OBSTETRICS & GYNECOLOGY

## 2023-10-19 PROCEDURE — 2500000004 HC RX 250 GENERAL PHARMACY W/ HCPCS (ALT 636 FOR OP/ED): Performed by: NURSE ANESTHETIST, CERTIFIED REGISTERED

## 2023-10-19 PROCEDURE — 51702 INSERT TEMP BLADDER CATH: CPT

## 2023-10-19 PROCEDURE — 10907ZC DRAINAGE OF AMNIOTIC FLUID, THERAPEUTIC FROM PRODUCTS OF CONCEPTION, VIA NATURAL OR ARTIFICIAL OPENING: ICD-10-PCS

## 2023-10-19 PROCEDURE — 4A1H7CZ MONITORING OF PRODUCTS OF CONCEPTION, CARDIAC RATE, VIA NATURAL OR ARTIFICIAL OPENING: ICD-10-PCS | Performed by: OBSTETRICS & GYNECOLOGY

## 2023-10-19 PROCEDURE — 3700000002 HC GENERAL ANESTHESIA TIME - EACH INCREMENTAL 1 MINUTE: Performed by: OBSTETRICS & GYNECOLOGY

## 2023-10-19 PROCEDURE — 96372 THER/PROPH/DIAG INJ SC/IM: CPT | Performed by: OBSTETRICS & GYNECOLOGY

## 2023-10-19 PROCEDURE — 2500000004 HC RX 250 GENERAL PHARMACY W/ HCPCS (ALT 636 FOR OP/ED): Performed by: OBSTETRICS & GYNECOLOGY

## 2023-10-19 PROCEDURE — 3700000008: Performed by: OBSTETRICS & GYNECOLOGY

## 2023-10-19 PROCEDURE — 2500000001 HC RX 250 WO HCPCS SELF ADMINISTERED DRUGS (ALT 637 FOR MEDICARE OP)

## 2023-10-19 PROCEDURE — 88307 TISSUE EXAM BY PATHOLOGIST: CPT | Performed by: STUDENT IN AN ORGANIZED HEALTH CARE EDUCATION/TRAINING PROGRAM

## 2023-10-19 PROCEDURE — 2720000007 HC OR 272 NO HCPCS

## 2023-10-19 PROCEDURE — 7100000016 HC LABOR RECOVERY PER HOUR: Performed by: OBSTETRICS & GYNECOLOGY

## 2023-10-19 PROCEDURE — 7210000002 HC LABOR PER HOUR

## 2023-10-19 PROCEDURE — 3700000007: Performed by: OBSTETRICS & GYNECOLOGY

## 2023-10-19 PROCEDURE — 2500000004 HC RX 250 GENERAL PHARMACY W/ HCPCS (ALT 636 FOR OP/ED)

## 2023-10-19 PROCEDURE — 59510 CESAREAN DELIVERY: CPT | Performed by: OBSTETRICS & GYNECOLOGY

## 2023-10-19 PROCEDURE — 3700000014 HC AN EPIDURAL BLOCK CHARGE: Performed by: OBSTETRICS & GYNECOLOGY

## 2023-10-19 PROCEDURE — 7100000002 HC RECOVERY ROOM TIME - EACH INCREMENTAL 1 MINUTE: Performed by: OBSTETRICS & GYNECOLOGY

## 2023-10-19 PROCEDURE — 7100000001 HC RECOVERY ROOM TIME - INITIAL BASE CHARGE: Performed by: OBSTETRICS & GYNECOLOGY

## 2023-10-19 PROCEDURE — 01968 ANES/ANALG CS DLVR NEURAXIAL: CPT | Performed by: NURSE ANESTHETIST, CERTIFIED REGISTERED

## 2023-10-19 PROCEDURE — 59050 FETAL MONITOR W/REPORT: CPT

## 2023-10-19 RX ORDER — DIPHENHYDRAMINE HCL 25 MG
25 CAPSULE ORAL EVERY 4 HOURS PRN
Status: DISCONTINUED | OUTPATIENT
Start: 2023-10-19 | End: 2023-10-22 | Stop reason: HOSPADM

## 2023-10-19 RX ORDER — OXYTOCIN 10 [USP'U]/ML
10 INJECTION, SOLUTION INTRAMUSCULAR; INTRAVENOUS ONCE AS NEEDED
Status: COMPLETED | OUTPATIENT
Start: 2023-10-19 | End: 2023-10-19

## 2023-10-19 RX ORDER — NALBUPHINE HYDROCHLORIDE 10 MG/ML
5 INJECTION, SOLUTION INTRAMUSCULAR; INTRAVENOUS; SUBCUTANEOUS
Status: ACTIVE | OUTPATIENT
Start: 2023-10-19 | End: 2023-10-20

## 2023-10-19 RX ORDER — MISOPROSTOL 200 UG/1
800 TABLET ORAL ONCE AS NEEDED
Status: COMPLETED | OUTPATIENT
Start: 2023-10-19 | End: 2023-10-19

## 2023-10-19 RX ORDER — LIDOCAINE HYDROCHLORIDE AND EPINEPHRINE 15; 5 MG/ML; UG/ML
INJECTION, SOLUTION EPIDURAL AS NEEDED
Status: DISCONTINUED | OUTPATIENT
Start: 2023-10-19 | End: 2023-10-19

## 2023-10-19 RX ORDER — TRANEXAMIC ACID 100 MG/ML
1000 INJECTION, SOLUTION INTRAVENOUS ONCE AS NEEDED
Status: DISCONTINUED | OUTPATIENT
Start: 2023-10-19 | End: 2023-10-22 | Stop reason: HOSPADM

## 2023-10-19 RX ORDER — POLYETHYLENE GLYCOL 3350 17 G/17G
17 POWDER, FOR SOLUTION ORAL 2 TIMES DAILY PRN
Status: DISCONTINUED | OUTPATIENT
Start: 2023-10-19 | End: 2023-10-22 | Stop reason: HOSPADM

## 2023-10-19 RX ORDER — PHENYLEPHRINE HCL IN 0.9% NACL 1 MG/10 ML
SYRINGE (ML) INTRAVENOUS AS NEEDED
Status: DISCONTINUED | OUTPATIENT
Start: 2023-10-19 | End: 2023-10-19

## 2023-10-19 RX ORDER — MORPHINE SULFATE 1 MG/ML
INJECTION, SOLUTION EPIDURAL; INTRATHECAL; INTRAVENOUS AS NEEDED
Status: DISCONTINUED | OUTPATIENT
Start: 2023-10-19 | End: 2023-10-19

## 2023-10-19 RX ORDER — ONDANSETRON HYDROCHLORIDE 2 MG/ML
4 INJECTION, SOLUTION INTRAVENOUS EVERY 6 HOURS PRN
Status: DISCONTINUED | OUTPATIENT
Start: 2023-10-19 | End: 2023-10-22 | Stop reason: HOSPADM

## 2023-10-19 RX ORDER — CLINDAMYCIN PHOSPHATE 900 MG/50ML
900 INJECTION, SOLUTION INTRAVENOUS EVERY 8 HOURS
Status: DISCONTINUED | OUTPATIENT
Start: 2023-10-20 | End: 2023-10-19

## 2023-10-19 RX ORDER — FENTANYL/ROPIVACAINE/NS/PF 2MCG/ML-.2
0-25 PLASTIC BAG, INJECTION (ML) INJECTION CONTINUOUS
Status: DISCONTINUED | OUTPATIENT
Start: 2023-10-19 | End: 2023-10-19

## 2023-10-19 RX ORDER — SIMETHICONE 80 MG
80 TABLET,CHEWABLE ORAL 4 TIMES DAILY PRN
Status: DISCONTINUED | OUTPATIENT
Start: 2023-10-19 | End: 2023-10-22 | Stop reason: HOSPADM

## 2023-10-19 RX ORDER — MINERAL OIL
OIL (ML) ORAL
Status: DISPENSED
Start: 2023-10-19 | End: 2023-10-19

## 2023-10-19 RX ORDER — BISACODYL 10 MG/1
10 SUPPOSITORY RECTAL DAILY PRN
Status: DISCONTINUED | OUTPATIENT
Start: 2023-10-19 | End: 2023-10-22 | Stop reason: HOSPADM

## 2023-10-19 RX ORDER — CALCIUM CARBONATE 200(500)MG
1000 TABLET,CHEWABLE ORAL ONCE
Status: COMPLETED | OUTPATIENT
Start: 2023-10-19 | End: 2023-10-19

## 2023-10-19 RX ORDER — KETOROLAC TROMETHAMINE 30 MG/ML
INJECTION, SOLUTION INTRAMUSCULAR; INTRAVENOUS AS NEEDED
Status: DISCONTINUED | OUTPATIENT
Start: 2023-10-19 | End: 2023-10-19

## 2023-10-19 RX ORDER — DEXAMETHASONE SODIUM PHOSPHATE 4 MG/ML
INJECTION, SOLUTION INTRA-ARTICULAR; INTRALESIONAL; INTRAMUSCULAR; INTRAVENOUS; SOFT TISSUE AS NEEDED
Status: DISCONTINUED | OUTPATIENT
Start: 2023-10-19 | End: 2023-10-19

## 2023-10-19 RX ORDER — DIPHENHYDRAMINE HYDROCHLORIDE 50 MG/ML
25 INJECTION INTRAMUSCULAR; INTRAVENOUS EVERY 4 HOURS PRN
Status: DISCONTINUED | OUTPATIENT
Start: 2023-10-19 | End: 2023-10-22 | Stop reason: HOSPADM

## 2023-10-19 RX ORDER — ACETAMINOPHEN 325 MG/1
975 TABLET ORAL EVERY 6 HOURS SCHEDULED
Status: DISCONTINUED | OUTPATIENT
Start: 2023-10-19 | End: 2023-10-22 | Stop reason: HOSPADM

## 2023-10-19 RX ORDER — CARBOPROST TROMETHAMINE 250 UG/ML
250 INJECTION, SOLUTION INTRAMUSCULAR ONCE AS NEEDED
Status: DISCONTINUED | OUTPATIENT
Start: 2023-10-19 | End: 2023-10-22 | Stop reason: HOSPADM

## 2023-10-19 RX ORDER — NALOXONE HYDROCHLORIDE 0.4 MG/ML
0.1 INJECTION, SOLUTION INTRAMUSCULAR; INTRAVENOUS; SUBCUTANEOUS EVERY 5 MIN PRN
Status: DISCONTINUED | OUTPATIENT
Start: 2023-10-19 | End: 2023-10-22 | Stop reason: HOSPADM

## 2023-10-19 RX ORDER — OXYTOCIN/0.9 % SODIUM CHLORIDE 30/500 ML
60 PLASTIC BAG, INJECTION (ML) INTRAVENOUS ONCE AS NEEDED
Status: DISCONTINUED | OUTPATIENT
Start: 2023-10-19 | End: 2023-10-22 | Stop reason: HOSPADM

## 2023-10-19 RX ORDER — KETOROLAC TROMETHAMINE 30 MG/ML
30 INJECTION, SOLUTION INTRAMUSCULAR; INTRAVENOUS EVERY 6 HOURS
Status: DISPENSED | OUTPATIENT
Start: 2023-10-19 | End: 2023-10-20

## 2023-10-19 RX ORDER — HYDRALAZINE HYDROCHLORIDE 20 MG/ML
5 INJECTION INTRAMUSCULAR; INTRAVENOUS ONCE AS NEEDED
Status: DISCONTINUED | OUTPATIENT
Start: 2023-10-19 | End: 2023-10-22 | Stop reason: HOSPADM

## 2023-10-19 RX ORDER — CLINDAMYCIN PHOSPHATE 900 MG/50ML
900 INJECTION, SOLUTION INTRAVENOUS EVERY 8 HOURS
Status: COMPLETED | OUTPATIENT
Start: 2023-10-20 | End: 2023-10-21

## 2023-10-19 RX ORDER — LABETALOL HYDROCHLORIDE 5 MG/ML
20 INJECTION, SOLUTION INTRAVENOUS ONCE AS NEEDED
Status: DISCONTINUED | OUTPATIENT
Start: 2023-10-19 | End: 2023-10-22 | Stop reason: HOSPADM

## 2023-10-19 RX ORDER — OXYCODONE HYDROCHLORIDE 10 MG/1
10 TABLET ORAL EVERY 4 HOURS PRN
Status: DISCONTINUED | OUTPATIENT
Start: 2023-10-20 | End: 2023-10-22 | Stop reason: HOSPADM

## 2023-10-19 RX ORDER — ADHESIVE BANDAGE
10 BANDAGE TOPICAL
Status: DISCONTINUED | OUTPATIENT
Start: 2023-10-19 | End: 2023-10-22 | Stop reason: HOSPADM

## 2023-10-19 RX ORDER — LIDOCAINE 560 MG/1
1 PATCH PERCUTANEOUS; TOPICAL; TRANSDERMAL
Status: DISCONTINUED | OUTPATIENT
Start: 2023-10-19 | End: 2023-10-22 | Stop reason: HOSPADM

## 2023-10-19 RX ORDER — OXYCODONE HYDROCHLORIDE 5 MG/1
5 TABLET ORAL EVERY 4 HOURS PRN
Status: DISCONTINUED | OUTPATIENT
Start: 2023-10-20 | End: 2023-10-22 | Stop reason: HOSPADM

## 2023-10-19 RX ORDER — ENOXAPARIN SODIUM 100 MG/ML
40 INJECTION SUBCUTANEOUS DAILY
Status: DISCONTINUED | OUTPATIENT
Start: 2023-10-20 | End: 2023-10-22 | Stop reason: HOSPADM

## 2023-10-19 RX ORDER — METHYLERGONOVINE MALEATE 0.2 MG/ML
0.2 INJECTION INTRAVENOUS ONCE AS NEEDED
Status: DISCONTINUED | OUTPATIENT
Start: 2023-10-19 | End: 2023-10-22 | Stop reason: HOSPADM

## 2023-10-19 RX ORDER — LOPERAMIDE HYDROCHLORIDE 2 MG/1
4 CAPSULE ORAL EVERY 2 HOUR PRN
Status: DISCONTINUED | OUTPATIENT
Start: 2023-10-19 | End: 2023-10-22 | Stop reason: HOSPADM

## 2023-10-19 RX ORDER — IBUPROFEN 600 MG/1
600 TABLET ORAL EVERY 6 HOURS
Status: DISCONTINUED | OUTPATIENT
Start: 2023-10-20 | End: 2023-10-22 | Stop reason: HOSPADM

## 2023-10-19 RX ORDER — LIDOCAINE HYDROCHLORIDE 20 MG/ML
INJECTION, SOLUTION EPIDURAL; INFILTRATION; INTRACAUDAL; PERINEURAL AS NEEDED
Status: DISCONTINUED | OUTPATIENT
Start: 2023-10-19 | End: 2023-10-19

## 2023-10-19 RX ORDER — ONDANSETRON 4 MG/1
4 TABLET, FILM COATED ORAL EVERY 6 HOURS PRN
Status: DISCONTINUED | OUTPATIENT
Start: 2023-10-19 | End: 2023-10-22 | Stop reason: HOSPADM

## 2023-10-19 RX ORDER — CLINDAMYCIN PHOSPHATE 900 MG/50ML
900 INJECTION, SOLUTION INTRAVENOUS ONCE
Status: COMPLETED | OUTPATIENT
Start: 2023-10-19 | End: 2023-10-19

## 2023-10-19 RX ORDER — NIFEDIPINE 10 MG/1
10 CAPSULE ORAL ONCE AS NEEDED
Status: DISCONTINUED | OUTPATIENT
Start: 2023-10-19 | End: 2023-10-22 | Stop reason: HOSPADM

## 2023-10-19 RX ORDER — MEPERIDINE HYDROCHLORIDE 50 MG/ML
INJECTION INTRAMUSCULAR; INTRAVENOUS; SUBCUTANEOUS AS NEEDED
Status: DISCONTINUED | OUTPATIENT
Start: 2023-10-19 | End: 2023-10-19

## 2023-10-19 RX ORDER — SCOLOPAMINE TRANSDERMAL SYSTEM 1 MG/1
PATCH, EXTENDED RELEASE TRANSDERMAL AS NEEDED
Status: DISCONTINUED | OUTPATIENT
Start: 2023-10-19 | End: 2023-10-19

## 2023-10-19 RX ORDER — OXYTOCIN 10 [USP'U]/ML
10 INJECTION, SOLUTION INTRAMUSCULAR; INTRAVENOUS ONCE AS NEEDED
Status: DISCONTINUED | OUTPATIENT
Start: 2023-10-19 | End: 2023-10-22 | Stop reason: HOSPADM

## 2023-10-19 RX ORDER — FENTANYL CITRATE 50 UG/ML
INJECTION, SOLUTION INTRAMUSCULAR; INTRAVENOUS AS NEEDED
Status: DISCONTINUED | OUTPATIENT
Start: 2023-10-19 | End: 2023-10-19

## 2023-10-19 RX ADMIN — Medication 1000 MG: at 08:40

## 2023-10-19 RX ADMIN — Medication 150 MCG: at 15:57

## 2023-10-19 RX ADMIN — OXYTOCIN 10 UNITS: 10 INJECTION INTRAVENOUS at 16:30

## 2023-10-19 RX ADMIN — SODIUM CHLORIDE, POTASSIUM CHLORIDE, SODIUM LACTATE AND CALCIUM CHLORIDE 125 ML/HR: 600; 310; 30; 20 INJECTION, SOLUTION INTRAVENOUS at 06:30

## 2023-10-19 RX ADMIN — DEXAMETHASONE SODIUM PHOSPHATE 4 MG: 4 INJECTION, SOLUTION INTRAMUSCULAR; INTRAVENOUS at 16:08

## 2023-10-19 RX ADMIN — MEPERIDINE HYDROCHLORIDE 12.5 MG: 50 INJECTION INTRAMUSCULAR; INTRAVENOUS; SUBCUTANEOUS at 16:31

## 2023-10-19 RX ADMIN — SODIUM CHLORIDE, POTASSIUM CHLORIDE, SODIUM LACTATE AND CALCIUM CHLORIDE: 600; 310; 30; 20 INJECTION, SOLUTION INTRAVENOUS at 16:43

## 2023-10-19 RX ADMIN — Medication 7 ML/HR: at 05:01

## 2023-10-19 RX ADMIN — AZITHROMYCIN 500 MG: 500 INJECTION, POWDER, LYOPHILIZED, FOR SOLUTION INTRAVENOUS at 15:58

## 2023-10-19 RX ADMIN — Medication 2 MILLI-UNITS/MIN: at 07:24

## 2023-10-19 RX ADMIN — FENTANYL CITRATE 100 MCG: 50 INJECTION, SOLUTION INTRAMUSCULAR; INTRAVENOUS at 14:00

## 2023-10-19 RX ADMIN — MORPHINE SULFATE 2.5 MG: 1 INJECTION, SOLUTION EPIDURAL; INTRATHECAL; INTRAVENOUS at 16:59

## 2023-10-19 RX ADMIN — LIDOCAINE HYDROCHLORIDE 100 MG: 20 INJECTION, SOLUTION EPIDURAL; INFILTRATION; INTRACAUDAL; PERINEURAL at 16:55

## 2023-10-19 RX ADMIN — CLINDAMYCIN IN 5 PERCENT DEXTROSE 900 MG: 18 INJECTION, SOLUTION INTRAVENOUS at 15:55

## 2023-10-19 RX ADMIN — SODIUM CHLORIDE, SODIUM LACTATE, POTASSIUM CHLORIDE, AND CALCIUM CHLORIDE 500 ML: 600; 310; 30; 20 INJECTION, SOLUTION INTRAVENOUS at 04:30

## 2023-10-19 RX ADMIN — ACETAMINOPHEN 975 MG: 325 TABLET ORAL at 23:38

## 2023-10-19 RX ADMIN — KETOROLAC TROMETHAMINE 30 MG: 30 INJECTION, SOLUTION INTRAMUSCULAR at 16:59

## 2023-10-19 RX ADMIN — SCOLOPAMINE TRANSDERMAL SYSTEM 1 PATCH: 1 PATCH, EXTENDED RELEASE TRANSDERMAL at 16:08

## 2023-10-19 RX ADMIN — CLINDAMYCIN IN 5 PERCENT DEXTROSE 900 MG: 18 INJECTION, SOLUTION INTRAVENOUS at 23:38

## 2023-10-19 RX ADMIN — MISOPROSTOL 400 MCG: 200 TABLET ORAL at 16:00

## 2023-10-19 RX ADMIN — KETOROLAC TROMETHAMINE 30 MG: 30 INJECTION, SOLUTION INTRAMUSCULAR; INTRAVENOUS at 23:37

## 2023-10-19 RX ADMIN — ONDANSETRON 4 MG: 2 INJECTION INTRAMUSCULAR; INTRAVENOUS at 16:08

## 2023-10-19 RX ADMIN — MEPERIDINE HYDROCHLORIDE 12.5 MG: 50 INJECTION INTRAMUSCULAR; INTRAVENOUS; SUBCUTANEOUS at 16:40

## 2023-10-19 RX ADMIN — LIDOCAINE HYDROCHLORIDE AND EPINEPHRINE 5 ML: 15; 5 INJECTION, SOLUTION EPIDURAL at 04:57

## 2023-10-19 RX ADMIN — GENTAMICIN SULFATE 400 MG: 40 INJECTION, SOLUTION INTRAMUSCULAR; INTRAVENOUS at 16:45

## 2023-10-19 RX ADMIN — LIDOCAINE HYDROCHLORIDE 400 MG: 20 INJECTION, SOLUTION EPIDURAL; INFILTRATION; INTRACAUDAL; PERINEURAL at 15:53

## 2023-10-19 ASSESSMENT — PAIN SCALES - GENERAL
PAINLEVEL_OUTOF10: 8
PAINLEVEL_OUTOF10: 2
PAINLEVEL_OUTOF10: 0 - NO PAIN
PAINLEVEL_OUTOF10: 5 - MODERATE PAIN
PAINLEVEL_OUTOF10: 0 - NO PAIN
PAINLEVEL_OUTOF10: 8
PAINLEVEL_OUTOF10: 0 - NO PAIN
PAINLEVEL_OUTOF10: 0 - NO PAIN
PAINLEVEL_OUTOF10: 8
PAINLEVEL_OUTOF10: 0 - NO PAIN
PAINLEVEL_OUTOF10: 3
PAINLEVEL_OUTOF10: 5 - MODERATE PAIN
PAINLEVEL_OUTOF10: 0 - NO PAIN
PAINLEVEL_OUTOF10: 4
PAINLEVEL_OUTOF10: 0 - NO PAIN
PAINLEVEL_OUTOF10: 6
PAINLEVEL_OUTOF10: 5 - MODERATE PAIN
PAINLEVEL_OUTOF10: 3
PAINLEVEL_OUTOF10: 0 - NO PAIN
PAINLEVEL_OUTOF10: 8
PAINLEVEL_OUTOF10: 0 - NO PAIN
PAINLEVEL_OUTOF10: 5 - MODERATE PAIN
PAINLEVEL_OUTOF10: 0 - NO PAIN
PAINLEVEL_OUTOF10: 5 - MODERATE PAIN
PAINLEVEL_OUTOF10: 0 - NO PAIN

## 2023-10-19 NOTE — PROGRESS NOTES
Intrapartum Progress Note    Assessment/Plan   Rosalva Pineda is a 27 y.o.  at 39w2d. ENRIKE: 10/24/2023, by Ultrasound.   Latent labor  Cat 1 tracing  IV fluid bolus at this time for management of tachysytole x 30 minutes, if not resolve with decrease pitocin by half  Continue with frequent position changes to aid in fetal descent and rotation  Anticipate       Principal Problem:    39 weeks gestation of pregnancy    Pregnancy Problems (from 23 to present)       Problem Noted Resolved    Prenatal care in third trimester 10/18/2023 by PHAN Fine No    Priority:  Medium      Overview Addendum 10/18/2023  2:17 PM by PHAN Fine     Anxiety and depression, no meds. -- Wants to be discharged from hospital PP with anti-anxiety medication.  Covid vaccinated; declines flu vaccine.  Normal rr NIPS.  It's a BOY!  Starting BMI 31.  Hx of Asthma, rare use of inhaler.         39 weeks gestation of pregnancy 10/18/2023 by PHAN Dawson No    Priority:  Medium              Subjective   Patient reports feelings of rectal pressure and desires cervical exam    Objective   Last Vitals:  Temp Pulse Resp BP MAP Pulse Ox   37.2 °C (98.9 °F) (!) 115 16 130/61   97 %     Vitals Min/Max Last 24 Hours:  Temp  Min: 36.6 °C (97.9 °F)  Max: 37.3 °C (99.1 °F)  Pulse  Min: 90  Max: 139  Resp  Min: 16  Max: 18  BP  Min: 110/56  Max: 139/74    TPRBP mild maternal tachycardia, Dr. Lee aware  Cervical Exam: 5.5/90/0  FHR     Baseline:140     Variability: moderate     Accels: present     Decels: absent  TOCO:1-1.5  Oxytocin: 16 mu  Membrane status     Color of fluid: clear and non-odorous

## 2023-10-19 NOTE — PROGRESS NOTES
Labor Progress Note    Subjective:  Rosalva Pineda is iol for elevated b/p in office. PET labs normal no b/p elevations since admission    Objective:  Vitals  Temp:  [36.6 °C (97.9 °F)-37.1 °C (98.7 °F)] 36.6 °C (97.9 °F)  Heart Rate:  [] 100  Resp:  [16-18] 16  BP: (110-139)/(56-90) 118/82     Cervical Exam: rating ctx pain 8/10 denies any h/a or visual changes    FHR: 135 moderate variability + accels    Contractions:  q 1-5 pitocin at 22 mu    Membranes: AROM clear 3:40 VE 3/50/-2    Assessment/Plan:  27 y.o.  at 39w2d  Principal Problem:    39 weeks gestation of pregnancy     Latent labor  FHT Category 1  Continue to monitor labor progress PRN.  Pitocin per protocol    Nidhi Gr, APRN-CNM

## 2023-10-19 NOTE — PROGRESS NOTES
Late entry:     Requested that Dr. Lee report to bedside to evaluate persistent cat two tracing with worsening fetal tachycardia.   FHR: baseline 180, neg accels, minimal variability. Fetus continues to beresponsive to fetal scalp stim.   Contractions- 3-4, palpate moderate, IUPC removed due to inaccuracy and partially falling out.   Mother continues to remain afebrile    In the setting of being remote from delivery and persistent cat two tracing Dr. Lee recommends primary  section and both patient and SO agreeable.

## 2023-10-19 NOTE — OP NOTE
Op Note    Surgeons      Cynthia Lee MD - Primary    Resident/Fellow/Other Assistant:  Surgeon(s) and Role:     Procedure Summary  Anesthesia: Spinal ASA: II   Anesthesia Staff: CRNA: KUN Gómez-CRNA; KUN Han-CRNA   Quantitative Blood Loss: 1215 mL    Pre-operative diagnosis:    Principal Problem:    39 weeks gestation of pregnancy   Non reassuring fetal heart tracing, fetal tachycardia remote from delivery    Post-operative diagnosis: Same  Operative Findings: Normal appearing gravid uterus.  Grossly normal appearing fallopian tubes and ovaries.   male infant, amniotic fluid clear, infant in cephalic presentation    Complications:  Uterine atony -- TXA, intrauterine oxytocin, buccal cytotec and AGUSTINA placed  Specimens Collected: No specimens collected  Indications for procedure:   non reassuring fetal heart tracing  Procedure: primary low transverse  section  urgent       Procedure: Pt was taken to the OR where Spinal anesthesia was administered.  She was then placed in the dorsal supine position with a left lateral tilt. A lundberg catheter was placed, SCD’s were applied, and a vaginal prep was performed. A pre-procedure time out was performed.  The pt was given prophylactic dose of IV antibiotics.  She was then prepped and draped in the usual sterile fashion. A Pfannenstiel skin incision was made with the scalpel through the skin and subcutaneous fat to the underlying fascial layer.  The fascia was incised in the midline with the scalpel and the incision was extended bilaterally. The superior aspect of the incision was grasped, tented up with Kocher clamps and the rectus muscle was dissected off. The muscles were divided in the midline, the peritoneum was then identified and entered bluntly and incision extended superiorly and inferiorly taking care to avoid underlying viscera.  The bladder blade was inserted.           A low transverse uterine incision was made  with the scalpel, the uterine cavity was entered, and the hysterotomy was extended cephalocaudally by stretching.  The infant was delivered atraumatically, the cord was clamped and cut and infant was handed off to awaiting nursing.  A cord segment and blood sample were collected.  The placenta was then expressed.  The uterus was exteriorized and cleared of all clot and debris. The uterine incision was repaired using a running locked stitch of 0-monocryl.   There was significant uterine atony.  TXA, intrauterine oxytocin, buccal cytotec given.          The uterus was the placed back inside the pelvis and the AGUSTINA was placed and inflated with 180 mL.   The gutters were cleared of all clots and debris.  The hysterotomy was again evaluated and found to be hemostatic, the underside of the fascia and bladder and the rectus muscles were also found to be hemostatic.  The fascia was closed using a running stitch of 0-PDS.  The subcutaneous layer was irrigated, small bleeders were cauterized, and the subcutaneous layer was reapproximated using a running stitch of 2-0 monocryl.  The skin was closed with 4-0 stratafix.  All counts were correct, the patient tolerated the procedure well.  Dr. Lee was present for the entire procedure.  The patient and infant were taken back to the recovery room in stable condition.

## 2023-10-19 NOTE — CARE PLAN
Problem: Vaginal Birth or  Section  Goal: Fetal and maternal status remain reassuring during the birth process  Outcome: Met  Goal: Demonstrates labor coping techniques through delivery  Outcome: Met  Goal: Tolerate CRB for IOL placement maintenance until dislodgement/removal 12hrs after placement  Outcome: Met  Goal: Prevention of malpresentation/labor dystocia through delivery  Outcome: Met  Goal: Minimal s/sx of HDP and BP<160/110  Outcome: Met  Goal: No s/sx of infection through recovery  Outcome: Met  Goal: No s/sx of hemorrhage through recovery  Outcome: Met   The patient's goals for the shift include meet baby    The clinical goals for the shift include tolerate IOL

## 2023-10-19 NOTE — PROGRESS NOTES
Labor Progress Note    Subjective:  Rosalva Pineda is  here for iol for 1 b/p elevation    Objective:  Vitals  Temp:  [36.6 °C (97.9 °F)-37.3 °C (99.1 °F)] 37.3 °C (99.1 °F)  Heart Rate:  [] 120  Resp:  [16-18] 18  BP: (110-139)/(56-90) 125/61     Cervical Exam: 3-4/90/-2    FHR: 150 + accels no decels at present    Contractions: q 3-5 minutes    Membranes: ROM x 4 hours  Variable decels and late decels between 4:30-5A Pitocin turned off at 5:30.    Assessment/Plan:  27 y.o.  at 39w2d   Latent labor  FHT Category 1 at present  Restart pitocin  IUPC and FSE explained to patient placed to better monitor     KUN Dawson-DIXON

## 2023-10-19 NOTE — PROGRESS NOTES
Decision for urgent  delivery    Complete and pushing for 1 hour  +1 station, BRICE  No further descent in one hour  Fetal tachycardia with minimal variability    D/w pt station not low enough for operative vaginal delivery, recommend urgent  delivery pt and partner agree

## 2023-10-19 NOTE — HOSPITAL COURSE
27 y.o.  at 39w1d. ENRIKE: 10/24/2023, by Ultrasound.    B/p elevation in office . B/p on admission normal  admitted for IOL  /-2, PEC labs completed and wnl  1700 Pitocin started  10/19  03:40 AROM 3/50/-2            Pitocin at 22 mu  0700 pitocin restarted /-2  1125- patient feeling pressure, CE 5.5/90/0  1400- call from RN for concerns of uptrending FHR with baseline now 160 for the last 10 minutes- orders for IV fluid bolus   1430- fetal tachycardia, CE 10/100/+2- begin pushing  1535 decision for primary  section in the setting of persistent category two tracing- remote from delivery

## 2023-10-19 NOTE — LACTATION NOTE
"Lactation Consultant Note  Lactation Consultation  Reason for Consult: Initial assessment  Consultant Name: Briana Juan    Maternal Information  Has mother  before?: No  Infant to breast within first 2 hours of birth?: No  Breastfeeding Delayed Due to: Maternal status    Maternal Assessment  Breast Assessment: Medium  Nipple Assessment: Intact, Flat, Erect with stimulation  Areola Assessment: Normal    Infant Assessment  Infant Behavior: Sleepy    Feeding Assessment  Nutrition Source: Breastmilk  Feeding Method: Nursing at the breast  Feeding Position: Laid back  Suck/Feeding: Sustained, Coordinated suck/swallow/breathe  Latch Assessment: Moderate assistance is needed, Instructed on deep latch, Eagerly grasped on to latch, Deep latch obtained, Optimal angle of mouth opening, Comfortable with no pain, Sucks with long jaw movement, Chin and lower lip contact breast first, Bursts of sucking, swallowing, and rest, Flanged lips, Comfortable latch    LATCH TOOL  Latch: Grasps breast, tongue down, lips flanged, rhythmic sucking  Audible Swallowing: Spontaneous and intermittent (24 hours old)  Type of Nipple: Everted (After stimulation)  Comfort (Breast/Nipple): Soft/non-tender  Hold (Positioning): Minimal assist, teach one side, mother does other, staff holds  LATCH Score: 9    Breast Pump       Other OB Lactation Tools  Lactation Tools:  (Patient may need nipple shied on Left side, RIght side flat but out with stimultion and NB latched Deep)    Patient Follow-up  Inpatient Lactation Follow-up Needed : Yes  Outpatient Lactation Follow-up: Recommended    Other OB Lactation Documentation  Maternal Risk Factors: Flat or inverted nipple tissue,  delivery (AGUSTINA & 1250 blood loss)    Recommendations/Summary  RN in room at this time NB 2.5 HOL and sleepy,  assist with Latching. '  Mother  28yo  Primary  on 10/19/23 at 1606 of viable boy \"Alfie\" at 39.1 weeks gestation for Fetal Tachycardia " and prolonged pushing. Code Juan Daniel Called for Fetal Tachycardia. mother febrile after delivery, Mother has AGUSTINA placed and QBL 1250. Mother is A+.  Rn staff let nurse know mother expressed concern that NB not latching and sleepy and questions regarding nipple shield.  Able to assist mother with Latch on right side ( nipple somewhat flat but erect with stim), NB obtained wide open latch, flanged lips, long jaw movement, mother denies pain., NB actively sucking for 20min in laid back nursing position. NB sleeping in STS after feeding. RN at bedside to see if switch to left side. Needs reinforcement on positioning, mother aware of possible use of nipple shield, discussed use of nipple shield. NB currently able to obtain optimal latch with out on right side. Reviewed sleeping and eating patterns, hunger cues and nb stools/voids.  Parents given opportunity to ask question and answered to full satisfaction. Parents plan to call LC or RN for future feeds. Expressing gratitude for all of maternity staff at this time.    Taught ABC's of good positioning: arms around breast, infant belly to belly with parent, infant's curve of hip to parent's curve of body. Taught to have infant rest their chin on the breast below the areola. Parents instructed to wait for gape reflex elicited by chin pressure on the breast, before hugging infant close to facilitate latching. Parents demonstrate technique without assistance/with minimal assistance/ require assistance from IBCLC to time latching. Infant able to latch deeply with wide gape and sustained rhythmical sucking.

## 2023-10-19 NOTE — PROGRESS NOTES
Labor Progress Note    Subjective:  Rosalva Pineda is her for iol feeling crampy    Objective:  Vitals  Temp:  [36.8 °C (98.2 °F)-37.1 °C (98.7 °F)] 36.8 °C (98.2 °F)  Heart Rate:  [103-137] 103  Resp:  [16-18] 18  BP: (110-136)/(56-90) 127/76     Cervical Exam: deferred    FHR: 140 + accels    Contractions: q 5 minutes pitocin at 8 mu    Membranes: intact    Assessment/Plan:  27 y.o.  at 39w1d  Principal Problem:    39 weeks gestation of pregnancy     Latent labor  FHT Category 1  Continue to monitor labor progress PRN.  No b/p elevations since admission  Continue to titrate pitocin  Epidural when requested   Consider AROM when contractions become stronger  Maternal tachycardia persists 103-137    Nidhi Gr, KUN-DIXON

## 2023-10-19 NOTE — NURSING NOTE
0527: RN at bedside.  Late decelerations and Variable decelerations on monitor.  RN assisted with maternal repositioning.  Nidhi METCALF notified  0530: Pt continues to have recurrent late decelerations.  Multiple Maternal repositioning attempts with no improvement in decelerations.  SVE performed by CARMENCITA Severino.  Oxytocin stopped.   Nidhi METCALF notified.  0700: Nidhi METCALF at bedside.  SVE performed.  IUPC and FSE placed by DIXON.   Per Nidhi METCALF restart oxytocin.  Oxytocin restarted at this time.  0720: Bedside report given to Adriana TSE    
1100: Myesha KERRM at bedside, pt request SVE for rectal pressure. CNM request bolus of IV fluid for management of tachysystole.  1200: RN notified CNM of contraction pattern, new orders received to decrease the pitocin by half to 8 at this time.   1400: CNM notified of fetal tachycardia, new orders received to administer fluid bolus  1430: new orders given to turn Pitocin off  1435: pt complete  1443: pt start pushing  1456: CNM provided fetal scalp stem  1459: Rosa at bedside reviewing fetal tracing and POC, no new orders at this time  1510: fetal scalp stem provided by CNM  1512: fetal scalp stem provided by CNM  1522: fetal scalp stem provided by CNM  1528: fetal scalp stem provided by CN  1535: Carson called to bedside for fetal tracing   
1545: decision for urgent section made by Rosa for nonreassuring fetal heart tracing  1604: Uterine incision  
33.8

## 2023-10-19 NOTE — PROGRESS NOTES
Late entry:  Call from RN in regards to uptrending FHR. FHR for the last ten minutes 160. No maternal fever noted with temp of 37.1.    , moderate variability, + accels, neg decels,   Contractions 2-3, MVU's roughly 175-200    Orders given to initiate fluid bolus and continue to closely monitor maternal temp    Myesha Villagomez, KUN-USHAM

## 2023-10-19 NOTE — PROGRESS NOTES
Late entry  Labor Progress Note    Subjective:  Rosalva Pineda resting comfortably in bed s/p epidural bolus for pain    Objective:  Vitals. Maternal tachycardia, mother asymptomatic    FHR: 170, moderate variability, +accels, - decels,   Contractions: 1-3 with a mild  tetanic contraction noted for 5 minutes    CE 10/100/+2    Membranes  Membrane Status: AROM  Sac Identifier: Sac 1  Rupture Date: 10/19/23  Rupture Time: 033  Fluid Color: Clear  Fluid Odor: None  Fluid Amount: Small  Time since ROM: 12 hours    Assessment/Plan:  27 y.o.  at 39w2d  Principal Problem:    39 weeks gestation of pregnancy  Term IOL in the setting of elevated BP x1 in office   Active labor  FHT Category 2 for fetal tachycardia, overall reassuring for with accels and moderate variability  Discontinue pitocin at this time in the setting of prolonged contraction and category two tracing  Will begin pushing with close monitoring of maternal and fetal status  Will continue to monitor maternal temp for possible diagnosis of IAI   Dr. Lee made aware of cat two tracing for fetal tachycardia, cervical exam, and that patient will begin pushing.   Anticipate LEONIDAS Villagomez, KUN-DIXON

## 2023-10-20 LAB
ERYTHROCYTE [DISTWIDTH] IN BLOOD BY AUTOMATED COUNT: 13.8 % (ref 11.5–14.5)
HCT VFR BLD AUTO: 30.4 % (ref 36–46)
HGB BLD-MCNC: 10.2 G/DL (ref 12–16)
MCH RBC QN AUTO: 30.4 PG (ref 26–34)
MCHC RBC AUTO-ENTMCNC: 33.6 G/DL (ref 32–36)
MCV RBC AUTO: 91 FL (ref 80–100)
NRBC BLD-RTO: 0 /100 WBCS (ref 0–0)
PLATELET # BLD AUTO: 189 X10*3/UL (ref 150–450)
PMV BLD AUTO: 13 FL (ref 7.5–11.5)
RBC # BLD AUTO: 3.35 X10*6/UL (ref 4–5.2)
WBC # BLD AUTO: 19.6 X10*3/UL (ref 4.4–11.3)

## 2023-10-20 PROCEDURE — 2500000004 HC RX 250 GENERAL PHARMACY W/ HCPCS (ALT 636 FOR OP/ED): Performed by: OBSTETRICS & GYNECOLOGY

## 2023-10-20 PROCEDURE — 1120000001 HC OB PRIVATE ROOM DAILY

## 2023-10-20 PROCEDURE — 2500000004 HC RX 250 GENERAL PHARMACY W/ HCPCS (ALT 636 FOR OP/ED): Performed by: STUDENT IN AN ORGANIZED HEALTH CARE EDUCATION/TRAINING PROGRAM

## 2023-10-20 PROCEDURE — 85027 COMPLETE CBC AUTOMATED: CPT | Performed by: OBSTETRICS & GYNECOLOGY

## 2023-10-20 PROCEDURE — 36415 COLL VENOUS BLD VENIPUNCTURE: CPT | Performed by: OBSTETRICS & GYNECOLOGY

## 2023-10-20 PROCEDURE — 96372 THER/PROPH/DIAG INJ SC/IM: CPT | Performed by: OBSTETRICS & GYNECOLOGY

## 2023-10-20 PROCEDURE — 2500000001 HC RX 250 WO HCPCS SELF ADMINISTERED DRUGS (ALT 637 FOR MEDICARE OP): Performed by: OBSTETRICS & GYNECOLOGY

## 2023-10-20 RX ORDER — KETOROLAC TROMETHAMINE 30 MG/ML
30 INJECTION, SOLUTION INTRAMUSCULAR; INTRAVENOUS ONCE
Status: COMPLETED | OUTPATIENT
Start: 2023-10-20 | End: 2023-10-20

## 2023-10-20 RX ORDER — ACETAMINOPHEN 500 MG
1 TABLET ORAL 2 TIMES DAILY
Qty: 1 EACH | Refills: 0 | Status: SHIPPED | OUTPATIENT
Start: 2023-10-20 | End: 2023-10-25 | Stop reason: SDUPTHER

## 2023-10-20 RX ADMIN — GENTAMICIN SULFATE 500 MG: 40 INJECTION, SOLUTION INTRAMUSCULAR; INTRAVENOUS at 17:40

## 2023-10-20 RX ADMIN — ENOXAPARIN SODIUM 40 MG: 40 INJECTION SUBCUTANEOUS at 09:53

## 2023-10-20 RX ADMIN — CLINDAMYCIN IN 5 PERCENT DEXTROSE 900 MG: 18 INJECTION, SOLUTION INTRAVENOUS at 07:58

## 2023-10-20 RX ADMIN — ACETAMINOPHEN 975 MG: 325 TABLET ORAL at 13:27

## 2023-10-20 RX ADMIN — ACETAMINOPHEN 975 MG: 325 TABLET ORAL at 18:26

## 2023-10-20 RX ADMIN — CLINDAMYCIN IN 5 PERCENT DEXTROSE 900 MG: 18 INJECTION, SOLUTION INTRAVENOUS at 16:22

## 2023-10-20 RX ADMIN — ACETAMINOPHEN 975 MG: 325 TABLET ORAL at 05:54

## 2023-10-20 RX ADMIN — IBUPROFEN 600 MG: 600 TABLET, FILM COATED ORAL at 18:26

## 2023-10-20 RX ADMIN — KETOROLAC TROMETHAMINE 30 MG: 30 INJECTION, SOLUTION INTRAMUSCULAR; INTRAVENOUS at 05:53

## 2023-10-20 RX ADMIN — KETOROLAC TROMETHAMINE 30 MG: 30 INJECTION, SOLUTION INTRAMUSCULAR; INTRAVENOUS at 13:27

## 2023-10-20 ASSESSMENT — PAIN DESCRIPTION - DESCRIPTORS
DESCRIPTORS: SORE
DESCRIPTORS: SORE

## 2023-10-20 ASSESSMENT — PAIN SCALES - GENERAL
PAINLEVEL_OUTOF10: 0 - NO PAIN
PAINLEVEL_OUTOF10: 2
PAINLEVEL_OUTOF10: 7
PAINLEVEL_OUTOF10: 5 - MODERATE PAIN

## 2023-10-20 NOTE — PROGRESS NOTES
Postpartum Progress Note    Assessment/Plan   Rosalva Pineda is a 27 y.o., , who delivered at 39w2d gestation and is now postpartum day 1.    gHTN, Advised of recommendation to stay 72 hrs after delivery, pt agreeable  POD 1 s/p PCS for FHR tracing concerns, remote from delivery.   IAI, treated with Amp/Gent  PPH, stable s/p AGUSTINA removal, asymptomatic      Principal Problem:    39 weeks gestation of pregnancy    Pregnancy Problems (from 23 to present)       Problem Noted Resolved    Prenatal care in third trimester 10/18/2023 by PHAN Fine No    Priority:  Medium      Overview Addendum 10/18/2023  2:17 PM by PHAN Fine     Anxiety and depression, no meds. -- Wants to be discharged from hospital PP with anti-anxiety medication.  Covid vaccinated; declines flu vaccine.  Normal rr NIPS.  It's a BOY!  Starting BMI 31.  Hx of Asthma, rare use of inhaler.         39 weeks gestation of pregnancy 10/18/2023 by PHAN Dawson No    Priority:  Medium            Hospital course: gestational hypertension   section delivery  Patient is currently breastfeeding  The patient's blood type is A POS. Rhogam is not indicated.    Subjective   Her pain is well controlled with current medications  She is passing flatus  She is ambulating well  She is tolerating a Adult diet Regular  She reports no breast or nursing problems  She denies emotional concerns today   Her plan for contraception is N/A       Objective   Allergies:   Cefaclor         Last Vitals:  Temp Pulse Resp BP MAP Pulse Ox   36.4 °C (97.6 °F) 105 18 111/66   100 %     Vitals Min/Max Last 24 Hours:  Temp  Min: 36.1 °C (97 °F)  Max: 38.8 °C (101.9 °F)  Pulse  Min: 67  Max: 155  Resp  Min: 16  Max: 18  BP  Min: 105/52  Max: 145/88    Intake/Output:     Intake/Output Summary (Last 24 hours) at 10/20/2023 1412  Last data filed at 10/20/2023 1200  Gross per 24 hour   Intake 2050 ml   Output 2301 ml   Net -251 ml        Physical Exam:  General: Examination reveals a well developed, well nourished, female, in no acute distress. She is alert and cooperative.  Lungs: Normal respiratory effort.  Cardiac: Normal rate.  Breasts: lactating, no erythema or tenderness, nipples normal.  Incision: Postop drsg C/D/I.  Fundus: firm and at umbilicus.  Extremities: no redness or tenderness in the calves or thighs, edema 2+.  Psychological: awake and alert; oriented to person, place, and time.    Lab Data:  Lab Results   Component Value Date    ABO A 10/19/2023    LABRH POS 10/19/2023    ABSCRN NEG 10/18/2023     Lab Results   Component Value Date    WBC 19.6 (H) 10/20/2023    HGB 10.2 (L) 10/20/2023    HCT 30.4 (L) 10/20/2023     10/20/2023       Lab Results   Component Value Date    GLUCOSE 109 (H) 10/18/2023     (L) 10/18/2023    K 3.7 10/18/2023     10/18/2023    CO2 23 10/18/2023    ANIONGAP 13 10/18/2023    BUN 7 10/18/2023    CREATININE 0.43 (L) 10/18/2023    EGFR >90 10/18/2023    CALCIUM 9.1 10/18/2023    ALBUMIN 3.5 10/18/2023    PROT 6.7 10/18/2023    ALKPHOS 148 (H) 10/18/2023    ALT 12 10/18/2023    AST 16 10/18/2023    BILITOT 0.4 10/18/2023     Lab Results   Component Value Date    UTPCR 0.22 (H) 10/18/2023

## 2023-10-20 NOTE — CARE PLAN
Problem: Postpartum  Goal: Experiences normal postpartum course  Outcome: Progressing     Problem: Postpartum  Goal: Appropriate maternal -  bonding  Outcome: Progressing     Problem: Postpartum  Goal: Establish and maintain infant feeding pattern for adequate nutrition  Outcome: Progressing     Problem: Postpartum  Goal: No s/sx of hemorrhage  Outcome: Progressing     Problem: Postpartum  Goal: Minimal s/sx of HDP and BP<160/110  Outcome: Progressing     Problem: Pain - Adult  Goal: Verbalizes/displays adequate comfort level or baseline comfort level  Outcome: Progressing     Problem: Safety - Adult  Goal: Free from fall injury  Outcome: Progressing     Problem: Discharge Planning  Goal: Discharge to home or other facility with appropriate resources  Outcome: Progressing

## 2023-10-20 NOTE — PROGRESS NOTES
Patient seen and examined. Feeling tired and sore but overall well. Tolerating PO, ambulating, voiding. VS reviewed and WNL, afebrile. Sitting upright holding baby in nursery. No fundal tenderness on exam. Appropriate drop in hgb from 12.2 pre-op to 10.2 this AM. WBC 19.6. To complete 24hr gent/clinda given postpartum fever. Reviewed recommendation for 72hr stay for BP monitoring as pt does meet criteria for gHTN. All questions answered    Deepika Pardo MD

## 2023-10-20 NOTE — PROGRESS NOTES
Spiritual Care Visit    Clinical Encounter Type  Visited With: Patient  Routine Visit: Introduction  Continue Visiting: No                                            Taxonomy  Intended Effects: Build relationship of care and support, Convey a calming presence, Meaning-making, Preserve dignity and respect, Establish rapport and connectedness, Promote sense of peace, Maria R affirmation  Methods: Offer spiritual/Sikh support  Interventions: Acknowledge response to difficult experience, Ask guided questions about maria r, Provide a Sikh item(s), Share words of hope and inspiration    Patient shared her worries and the  listened and spoke comforting words.  Patient is a Bahai.   prayed for the patient and her baby at her request.

## 2023-10-21 PROCEDURE — 96372 THER/PROPH/DIAG INJ SC/IM: CPT | Performed by: OBSTETRICS & GYNECOLOGY

## 2023-10-21 PROCEDURE — 2500000004 HC RX 250 GENERAL PHARMACY W/ HCPCS (ALT 636 FOR OP/ED): Performed by: OBSTETRICS & GYNECOLOGY

## 2023-10-21 PROCEDURE — 1120000001 HC OB PRIVATE ROOM DAILY

## 2023-10-21 PROCEDURE — 2500000001 HC RX 250 WO HCPCS SELF ADMINISTERED DRUGS (ALT 637 FOR MEDICARE OP): Performed by: OBSTETRICS & GYNECOLOGY

## 2023-10-21 RX ADMIN — ENOXAPARIN SODIUM 40 MG: 40 INJECTION SUBCUTANEOUS at 09:33

## 2023-10-21 RX ADMIN — IBUPROFEN 600 MG: 600 TABLET, FILM COATED ORAL at 00:22

## 2023-10-21 RX ADMIN — ACETAMINOPHEN 975 MG: 325 TABLET ORAL at 00:22

## 2023-10-21 RX ADMIN — IBUPROFEN 600 MG: 600 TABLET, FILM COATED ORAL at 06:25

## 2023-10-21 RX ADMIN — CLINDAMYCIN IN 5 PERCENT DEXTROSE 900 MG: 18 INJECTION, SOLUTION INTRAVENOUS at 18:46

## 2023-10-21 RX ADMIN — IBUPROFEN 600 MG: 600 TABLET, FILM COATED ORAL at 12:28

## 2023-10-21 RX ADMIN — ACETAMINOPHEN 975 MG: 325 TABLET ORAL at 18:45

## 2023-10-21 RX ADMIN — CLINDAMYCIN IN 5 PERCENT DEXTROSE 900 MG: 18 INJECTION, SOLUTION INTRAVENOUS at 10:50

## 2023-10-21 RX ADMIN — CLINDAMYCIN IN 5 PERCENT DEXTROSE 900 MG: 18 INJECTION, SOLUTION INTRAVENOUS at 02:43

## 2023-10-21 RX ADMIN — ACETAMINOPHEN 975 MG: 325 TABLET ORAL at 06:26

## 2023-10-21 RX ADMIN — IBUPROFEN 600 MG: 600 TABLET, FILM COATED ORAL at 18:44

## 2023-10-21 RX ADMIN — ACETAMINOPHEN 975 MG: 325 TABLET ORAL at 12:28

## 2023-10-21 ASSESSMENT — PAIN SCALES - GENERAL
PAINLEVEL_OUTOF10: 7
PAINLEVEL_OUTOF10: 5 - MODERATE PAIN
PAINLEVEL_OUTOF10: 5 - MODERATE PAIN

## 2023-10-21 ASSESSMENT — PAIN DESCRIPTION - DESCRIPTORS
DESCRIPTORS: SORE
DESCRIPTORS: ACHING;SORE

## 2023-10-21 NOTE — PROGRESS NOTES
Postpartum Progress Note    Assessment/Plan   Rosalva Pineda is a 27 y.o., , who delivered at 39w2d gestation and is now postpartum day 2.    - ghtn: normal-mild range Bps last 24hrs, plan to observe 72hrs pp  - Postop: recovering well, discussed wound care instructions and post-op restrictions, lovenox ppx   - endometritis: afebrile, s/p 24hrs PP Gent/Clinda  - PPH: mild tachycardia otherwise asx, Hgb 12.2 > 10.2    Anticipate discharge home POD#3, follow up BP check 1 week     Principal Problem:    39 weeks gestation of pregnancy    Pregnancy Problems (from 23 to present)       Problem Noted Resolved    Prenatal care in third trimester 10/18/2023 by PHAN Fine No    Priority:  Medium      Overview Addendum 10/18/2023  2:17 PM by PHAN Fine     Anxiety and depression, no meds. -- Wants to be discharged from hospital PP with anti-anxiety medication.  Covid vaccinated; declines flu vaccine.  Normal rr NIPS.  It's a BOY!  Starting BMI 31.  Hx of Asthma, rare use of inhaler.         39 weeks gestation of pregnancy 10/18/2023 by PHAN Dawson No    Priority:  Medium            Hospital course: endometritis  gestational hypertension  postpartum hemorrhage treated with massage, pitocin, and Danita   section. Patient's blood type A Pos, Rhogam not indicated.     Subjective   Her pain is well controlled with current medications  She is passing flatus  She is ambulating well  She is tolerating a Adult diet Regular  She reports no breast or nursing problems  She denies emotional concerns today   Her plan for contraception is undecided, wants to wait 2-3 years before next preg      Objective   Allergies:   Cefaclor         Last Vitals:  Temp Pulse Resp BP MAP Pulse Ox   36.8 °C (98.2 °F) (MEWS is 2 not 3) (!) 113 16 130/74 (retaken d/t pt feeling stressed at 0807 VS)   96 %     Vitals Min/Max Last 24 Hours:  Temp  Min: 36.4 °C (97.6 °F)  Max: 37.1 °C (98.7  °F)  Pulse  Min: 86  Max: 113  Resp  Min: 16  Max: 18  BP  Min: 109/61  Max: 148/72    Intake/Output:     Intake/Output Summary (Last 24 hours) at 10/21/2023 1035  Last data filed at 10/21/2023 0343  Gross per 24 hour   Intake 150 ml   Output 300 ml   Net -150 ml       Physical Exam:  General: Examination reveals a well developed, well nourished, female, in no acute distress. She is alert and cooperative.  Lungs: no increased effort on room air.  Abdomen: soft, gravid, nontender, nondistended, no abnormal masses, no epigastric pain.  Incision: healing well.  Extremities: no redness or tenderness in the calves or thighs, minimal appropriate edema.  Psychological: awake and alert; oriented to person, place, and time.    Lab Data:  Labs in chart were reviewed.    Medications:  Scheduled medications  acetaminophen, 975 mg, oral, q6h CHACE  clindamycin, 900 mg, intravenous, q8h  enoxaparin, 40 mg, subcutaneous, Daily  ibuprofen, 600 mg, oral, q6h  oxygen, , inhalation, Continuous - 02/gases      Continuous medications     PRN medications  PRN medications: benzocaine-menthoL, bisacodyl, calcium polycarbophiL, carboprost, diphenhydrAMINE **OR** diphenhydrAMINE, hydrALAZINE, HYDROmorphone, labetaloL, lanolin, lidocaine, loperamide, magnesium hydroxide, measles, mumps and rubella, methylergonovine, naloxone, NIFEdipine, ondansetron **OR** ondansetron, oxyCODONE, oxyCODONE, oxytocin, oxytocin, oxytocin, oxytocin, polyethylene glycol, simethicone, tranexamic acid, witch hazel

## 2023-10-21 NOTE — LACTATION NOTE
Lactation Consultant Note  Lactation Consultation  Reason for Consult: Follow-up assessment  Consultant Name: Carlos    Maternal Information  Has mother  before?: No  Infant to breast within first 2 hours of birth?: Yes  Exclusive Pump and Bottle Feed: No    Maternal Assessment  Breast Assessment: Medium, Compressible, Soft  Nipple Assessment: Intact, Erect  Areola Assessment: Normal    Infant Assessment  Infant Behavior: Quiet alert, Feeding cues observed, Content after feeding    Feeding Assessment  Nutrition Source: Breastmilk  Feeding Method: Nursing at the breast  Feeding Position: Baby led, Breast sandwich, Football/seated, Skin to skin, One side per feeding, Mother demonstrates good positioning, Chin tucked into breast  Suck/Feeding: Sustained, Coordinated suck/swallow/breathe, Audible swallowing, Tactile stimulation needed  Latch Assessment: Minimal assistance is needed, Comfortable latch, Bursts of sucking, swallowing, and rest, Flanged lips    LATCH TOOL  Latch: Grasps breast, tongue down, lips flanged, rhythmic sucking  Audible Swallowing: A few with stimulation  Type of Nipple: Everted (After stimulation)  Comfort (Breast/Nipple): Soft/non-tender  Hold (Positioning): No assist from staff, mother able to position/hold infant  LATCH Score: 9    Breast Pump       Other OB Lactation Tools       Patient Follow-up  Inpatient Lactation Follow-up Needed : Yes  Outpatient Lactation Follow-up: Recommended    Other OB Lactation Documentation  Maternal Risk Factors:  delivery    Recommendations/Summary  Mother is a 28 yo, , primary c/s on 10/19 at 1606. Birthweight 3670, infant with mother in room, goes to nursery for antibiotic doses. Followed up with mother this morning at 1000, infant asleep in skin to skin on mom. Mother told to call for next feeding. 1100, mother called out for lactation. Infant skin to skin on mother licking hands and smacking his lips. Mother able to independently latch  infant on right side in football hold with the breast sandwich. Infant with wide open mouth, deep latch, flanged lips, sucking and audible swallowing. Infant requires tactile stimulation, becomes sleepy at the breast. Infant fed for 10 minutes, asleep at the breast, relaxed hands. Encouraged mother to place infant skin to skin and if infant begins showing hunger cues offer the other side. Mother asked if she should pump the other side, discussed hand expressing the other side for more volumes. Reviewed expected milk volumes and expected white diapers at 72 hours of life. Mother wants lactation to see next feeding, told to call at 2pm or sooner if infant showing hunger cues.

## 2023-10-21 NOTE — CARE PLAN
Problem: Postpartum  Goal: Experiences normal postpartum course  Outcome: Progressing  Goal: Establish and maintain infant feeding pattern for adequate nutrition  Outcome: Progressing  Goal: Incisions, wounds, or drain sites healing without S/S of infection  Outcome: Progressing  Goal: No s/sx infection  Outcome: Progressing  Goal: Minimal s/sx of HDP and BP<160/110  Outcome: Progressing     Problem: Pain - Adult  Goal: Verbalizes/displays adequate comfort level or baseline comfort level  Outcome: Progressing     Problem: Safety - Adult  Goal: Free from fall injury  Outcome: Progressing     Problem: Discharge Planning  Goal: Discharge to home or other facility with appropriate resources  Outcome: Progressing   The patient's goals for the shift include bond with baby    The clinical goals for the shift include will maintain stable temperature and no signs of infection    Over the shift, the patient made progress toward the following goals. Barriers to progression include none. Recommendations to address these barriers include none.

## 2023-10-22 VITALS
OXYGEN SATURATION: 98 % | DIASTOLIC BLOOD PRESSURE: 87 MMHG | HEIGHT: 62 IN | WEIGHT: 219.03 LBS | TEMPERATURE: 98.8 F | HEART RATE: 107 BPM | BODY MASS INDEX: 40.31 KG/M2 | RESPIRATION RATE: 20 BRPM | SYSTOLIC BLOOD PRESSURE: 131 MMHG

## 2023-10-22 LAB
BLOOD EXPIRATION DATE: NORMAL
DISPENSE STATUS: NORMAL
PRODUCT BLOOD TYPE: 600
PRODUCT CODE: NORMAL
UNIT ABO: NORMAL
UNIT NUMBER: NORMAL
UNIT RH: NORMAL
UNIT VOLUME: 287
XM INTEP: NORMAL

## 2023-10-22 PROCEDURE — 96372 THER/PROPH/DIAG INJ SC/IM: CPT | Performed by: OBSTETRICS & GYNECOLOGY

## 2023-10-22 PROCEDURE — 2500000004 HC RX 250 GENERAL PHARMACY W/ HCPCS (ALT 636 FOR OP/ED): Performed by: OBSTETRICS & GYNECOLOGY

## 2023-10-22 PROCEDURE — 2500000001 HC RX 250 WO HCPCS SELF ADMINISTERED DRUGS (ALT 637 FOR MEDICARE OP): Performed by: OBSTETRICS & GYNECOLOGY

## 2023-10-22 RX ORDER — SERTRALINE HYDROCHLORIDE 50 MG/1
50 TABLET, FILM COATED ORAL DAILY
Qty: 30 TABLET | Refills: 1 | Status: SHIPPED | OUTPATIENT
Start: 2023-10-22 | End: 2023-11-30 | Stop reason: SDUPTHER

## 2023-10-22 RX ORDER — IBUPROFEN 600 MG/1
600 TABLET ORAL EVERY 6 HOURS
Qty: 20 TABLET | Refills: 0 | Status: SHIPPED | OUTPATIENT
Start: 2023-10-22 | End: 2023-11-02 | Stop reason: WASHOUT

## 2023-10-22 RX ORDER — ACETAMINOPHEN 500 MG
1000 TABLET ORAL EVERY 6 HOURS PRN
Status: ON HOLD | COMMUNITY
Start: 2023-10-22 | End: 2023-10-29 | Stop reason: SDUPTHER

## 2023-10-22 RX ORDER — OXYCODONE HYDROCHLORIDE 5 MG/1
5 TABLET ORAL EVERY 6 HOURS PRN
Qty: 10 TABLET | Refills: 0 | Status: SHIPPED | OUTPATIENT
Start: 2023-10-22 | End: 2023-10-25

## 2023-10-22 RX ADMIN — IBUPROFEN 600 MG: 600 TABLET, FILM COATED ORAL at 11:30

## 2023-10-22 RX ADMIN — ACETAMINOPHEN 975 MG: 325 TABLET ORAL at 17:29

## 2023-10-22 RX ADMIN — IBUPROFEN 600 MG: 600 TABLET, FILM COATED ORAL at 17:29

## 2023-10-22 RX ADMIN — ACETAMINOPHEN 975 MG: 325 TABLET ORAL at 06:12

## 2023-10-22 RX ADMIN — IBUPROFEN 600 MG: 600 TABLET, FILM COATED ORAL at 00:36

## 2023-10-22 RX ADMIN — IBUPROFEN 600 MG: 600 TABLET, FILM COATED ORAL at 06:12

## 2023-10-22 RX ADMIN — ACETAMINOPHEN 975 MG: 325 TABLET ORAL at 11:29

## 2023-10-22 RX ADMIN — ENOXAPARIN SODIUM 40 MG: 40 INJECTION SUBCUTANEOUS at 11:29

## 2023-10-22 RX ADMIN — ACETAMINOPHEN 975 MG: 325 TABLET ORAL at 00:36

## 2023-10-22 ASSESSMENT — PAIN SCALES - GENERAL
PAINLEVEL_OUTOF10: 5 - MODERATE PAIN
PAINLEVEL_OUTOF10: 5 - MODERATE PAIN
PAINLEVEL_OUTOF10: 4
PAINLEVEL_OUTOF10: 2
PAINLEVEL_OUTOF10: 6

## 2023-10-22 NOTE — DISCHARGE SUMMARY
Discharge Summary    Admission Date: 10/18/2023  Discharge Date: 10/22/2023     Discharge Diagnosis  39 weeks gestation of pregnancy    Hospital Course  Delivery Date: 10/19/2023  4:06 PM   Delivery type: , Low Transverse    GA at delivery: 39w2d  Outcome: Living   Anesthesia during delivery: Epidural   Intrapartum complications: Fetal Intolerance   Feeding method: Breastfeeding Status: Yes     Procedures:    Contraception at discharge: none      Pertinent Physical Exam At Time of Discharge  General: Examination reveals a well developed, well nourished, female, in no acute distress. She is alert and cooperative.  Incision: healing well, no erythema, Aquacel in place.  Fundus: firm and below umbilicus.  Extremities: no redness or tenderness in the calves or thighs, no edema.  Psychological: awake and alert; oriented to person, place, and time.    Discharge Meds     Your medication list        START taking these medications        Instructions Last Dose Given Next Dose Due   acetaminophen 500 mg tablet  Commonly known as: Tylenol Extra Strength      Take 2 tablets (1,000 mg) by mouth every 6 hours if needed for mild pain (1 - 3).       blood pressure test kit-large kit      1 kit 2 times a day. Relax, sitting down for at least 5 mins before check BP.  Avoid caffeine, exercise, and smoking for at least 30 minutes before measurement.   Do not talk during the rest period or during the measurement.       ibuprofen 600 mg tablet      Take 1 tablet (600 mg) by mouth every 6 hours for 10 days.       oxyCODONE 5 mg immediate release tablet  Commonly known as: Roxicodone      Take 1 tablet (5 mg) by mouth every 6 hours if needed for moderate pain (4 - 6) for up to 5 days.       sertraline 50 mg tablet  Commonly known as: Zoloft      Take 1 tablet (50 mg) by mouth once daily.              CONTINUE taking these medications        Instructions Last Dose Given Next Dose Due   CALCIUM ORAL            cholecalciferol 25 MCG (1000 UT) capsule  Commonly known as: Vitamin D-3           doxylamine-pyridoxine (vit B6) 10-10 mg tablet,delayed release (DR/EC)           magnesium oxide 400 mg tablet  Commonly known as: Mag-Ox           PNV 29-1 ORAL                     Where to Get Your Medications        These medications were sent to [facility]/pharmacy #[id number] - [place], [place] - [address] [facility] AT [facility]  [address] [place] [id number]      Phone: [phone]   sertraline 50 mg tablet       You can get these medications from any pharmacy    Bring a paper prescription for each of these medications  blood pressure test kit-large kit  ibuprofen 600 mg tablet  oxyCODONE 5 mg immediate release tablet  You don't need a prescription for these medications  acetaminophen 500 mg tablet          Complications Requiring Follow-Up  None    Test Results Pending At Discharge  Pending Labs       Order Current Status    Surgical Pathology Exam - [facility] In process            Outpatient Follow-Up  Future Appointments   Date Time Provider Department Center   10/25/2023  4:20 PM KUN Fine-[id number] [place]   2/19/2024  9:00 AM Denzel King MD [id number] spent 10 minutes in the professional and overall care of this patient.      Brendan Sauceda MD

## 2023-10-22 NOTE — CARE PLAN
The patient's goals for the shift include bond with baby    The clinical goals for the shift include will maintain stable temperature and no signs of infection

## 2023-10-22 NOTE — LACTATION NOTE
Lactation Consultant Note  Lactation Consultation  Reason for Consult: Follow-up assessment, Breast/nipple pain, Difficult latch  Consultant Name: Alexandrea Soto    Maternal Information  Has mother  before?: No  Infant to breast within first 2 hours of birth?: Yes  Breastfeeding Delayed Due to: Maternal status  Exclusive Pump and Bottle Feed: No    Maternal Assessment  Breast Assessment: Medium, Filling  Nipple Assessment: Scabbed (left nipple bleeding)  Alterations in Nipple Condition: Stage II - abrasions, shallow crack/fissure, stripe  Areola Assessment: Normal    Infant Assessment  Infant Behavior: Readiness to feed  Infant Assessment: Good cupping of tongue, Tongue protrudes over alveolar ridge    Feeding Assessment  Nutrition Source: Breastmilk, Formula (medically indicated)  Feeding Method: Nursing at the breast, Paced bottle, Feeding expressed breastmilk  Feeding Position: Football/seated, Cross - cradle  Suck/Feeding: Sustained, Content after feeding, Coordinated suck/swallow/breathe  Latch Assessment: Maximum assistance is needed, Instructed on deep latch, Comfortable with no pain, Frequent audible swallows    LATCH TOOL  Latch: Grasps breast, tongue down, lips flanged, rhythmic sucking  Audible Swallowing: Spontaneous and intermittent (24 hours old)  Type of Nipple: Everted (After stimulation)  Comfort (Breast/Nipple): Engorged, cracked, bleeding, large blisters or bruises  Hold (Positioning): Minimal assist, teach one side, mother does other, staff holds  LATCH Score: 7    Breast Pump  Pump: Hospital grade electric pump  Frequency: Less than 3 times per day  Duration: 15-20 minutes per session  Breast Shield Size and Type: 24 mm  Volume of Milk Production: 12  Units of Volume: mL per session    Other OB Lactation Tools  Lactation Tools:  (Patient may need nipple shied on Left side, RIght side flat but out with stimultion and NB latched Deep)    Patient Follow-up  Inpatient Lactation Follow-up Needed  : Yes  Outpatient Lactation Follow-up: Recommended    Other OB Lactation Documentation  Maternal Risk Factors:  delivery  Additional Problem Noted: unstable blood sugars    Recommendations/Summary  Pt  was supplementing DHM after each feed at breast due to unstable blood sugars. Overnight, she switched to formula for supplement b/c she was worried she would not be able to take DHM home to supplement.   Pt is latching and then following with a bottle but her goal is to exclusively breastfeed. Reviewed need for insurance pumping to protect her supply and supplementing with her own pumped milk. Brought in pump and taught set up, use and cleaning. Pt pumped 12 ml. Plan reviewed with Dr Vargas to trial next 2 feeds on Pt's own milk (including pumped milk).   Left nipple started bleeding overnight- assisted with latch widening and deepening. Assisted with better postioning and Pt reports latch felt much better. Reviewed outpt support.

## 2023-10-22 NOTE — DISCHARGE INSTRUCTIONS
Call with bleeding soaking through more than one pad an hour or large clots more than the size of a golf ball. Call with unusual cramping not relieved with Tylenol or Motrin. Call with any temperature above 100.4. Call with any sudden onset of chest pain or shortness of breath. Call with any unusual or unilateral swelling of the lower extremities or pain in the calf with walking. Call with any redness of the skin around incision or stitches.

## 2023-10-24 LAB
LABORATORY COMMENT REPORT: NORMAL
PATH REPORT.FINAL DX SPEC: NORMAL
PATH REPORT.GROSS SPEC: NORMAL
PATH REPORT.RELEVANT HX SPEC: NORMAL
PATH REPORT.TOTAL CANCER: NORMAL

## 2023-10-25 ENCOUNTER — POSTPARTUM VISIT (OUTPATIENT)
Dept: OBSTETRICS AND GYNECOLOGY | Facility: CLINIC | Age: 28
End: 2023-10-25
Payer: COMMERCIAL

## 2023-10-25 ENCOUNTER — APPOINTMENT (OUTPATIENT)
Dept: OBSTETRICS AND GYNECOLOGY | Facility: CLINIC | Age: 28
End: 2023-10-25
Payer: COMMERCIAL

## 2023-10-25 VITALS
WEIGHT: 206.38 LBS | DIASTOLIC BLOOD PRESSURE: 86 MMHG | BODY MASS INDEX: 37.75 KG/M2 | SYSTOLIC BLOOD PRESSURE: 128 MMHG

## 2023-10-25 DIAGNOSIS — O13.9 GESTATIONAL HYPERTENSION, ANTEPARTUM (HHS-HCC): ICD-10-CM

## 2023-10-25 PROCEDURE — 0503F POSTPARTUM CARE VISIT: CPT | Performed by: OBSTETRICS & GYNECOLOGY

## 2023-10-25 RX ORDER — ACETAMINOPHEN 500 MG
1 TABLET ORAL AS NEEDED
Qty: 1 EACH | Refills: 0 | Status: SHIPPED | OUTPATIENT
Start: 2023-10-25 | End: 2023-11-30 | Stop reason: ALTCHOICE

## 2023-10-25 ASSESSMENT — EDINBURGH POSTNATAL DEPRESSION SCALE (EPDS)
TOTAL SCORE: 13
I HAVE BLAMED MYSELF UNNECESSARILY WHEN THINGS WENT WRONG: YES, SOME OF THE TIME
I HAVE BEEN ANXIOUS OR WORRIED FOR NO GOOD REASON: YES, VERY OFTEN
THINGS HAVE BEEN GETTING ON TOP OF ME: NO, MOST OF THE TIME I HAVE COPED QUITE WELL
I HAVE BEEN ABLE TO LAUGH AND SEE THE FUNNY SIDE OF THINGS: AS MUCH AS I ALWAYS COULD
I HAVE FELT SCARED OR PANICKY FOR NO GOOD REASON: YES, QUITE A LOT
I HAVE LOOKED FORWARD WITH ENJOYMENT TO THINGS: AS MUCH AS I EVER DID
THE THOUGHT OF HARMING MYSELF HAS OCCURRED TO ME: NEVER
I HAVE BEEN SO UNHAPPY THAT I HAVE BEEN CRYING: ONLY OCCASIONALLY
I HAVE BEEN SO UNHAPPY THAT I HAVE HAD DIFFICULTY SLEEPING: YES, MOST OF THE TIME
I HAVE FELT SAD OR MISERABLE: NO, NOT AT ALL

## 2023-10-25 NOTE — PROGRESS NOTES
Postpartum Visit    HPI:  Pt presents for her incision check  and blood pressure check  s/p  section for persistent category II tracing and remote from delivery and arrest of descent in second stage  on 10/19/23 complicated by gestation hypertension .  She had a baby boy.  She is Breast feeding which is going well.      She is taking only motrin and tylenol for pain (oxycodone was supposed to have been sent to the pharmacy but wasn't ultimately sent).  She has not been checking her BP because she never got a BP cuff.  She has no issues with voiding or Bms.     ROS:  Denies the following: Complains of the following:    - episiotomy site pain   - incisional drainage  - heavy bleeding  - breast pain  - cracked nipples  - breastfeeding problems  - abdominal pain  - vaginal discharge  - shortness of breath  - chest pain  - back pain  - leg pain  - depression  - suicidal ideation  - nausea  - vomiting  - fever  - pain with urination  - tiredness or fatigue  - headache - incisional pain  - irregular bleeding       O:  /86   Wt 93.6 kg (206 lb 6 oz)   LMP 2022   Breastfeeding Yes   BMI 37.75 kg/m²     General Appearance   - consistent with stated age, well groomed and cooperative    Integumentary  - skin warm and dry without rash    Head and Neck  - normalocephalic and neck supple    Chest and Lung Exam  - normal breathing effort, no respiratory distress    Abdomen  - soft, nontender, nondistended, incision - Aquacel dressing removed - incision c/d/I     Peripheral Vascular  - 2+ pedal and pretibial edema bilaterally     A/P:   Pt is a 27 y.o.  who presents for incision check  and blood pressure check  s/p primary c/s on 10/19.  Doing well overall postpartum.  Coping OK with new baby at home.   Was on prozac prior to pregnancy - plan made to start zoloft - she has the prescription but hasn't started it.  Encouraged her to start it.  Floral  Depression Scale Total: 13.   Breastfeeding going well.  . Baby doing well.   Hospital course complicated by gestational hypertension - BP here normal.  No medications.  Doesn't have BP cuff - prescription for cuff sent to pharmacy and call parameters reviewed. F/u for 6 week postpartum visit or sooner as needed.

## 2023-10-29 ENCOUNTER — HOSPITAL ENCOUNTER (OUTPATIENT)
Facility: HOSPITAL | Age: 28
Discharge: HOME | End: 2023-10-29
Attending: STUDENT IN AN ORGANIZED HEALTH CARE EDUCATION/TRAINING PROGRAM | Admitting: ADVANCED PRACTICE MIDWIFE
Payer: COMMERCIAL

## 2023-10-29 ENCOUNTER — APPOINTMENT (OUTPATIENT)
Dept: RADIOLOGY | Facility: HOSPITAL | Age: 28
End: 2023-10-29
Payer: COMMERCIAL

## 2023-10-29 VITALS
WEIGHT: 196.65 LBS | HEIGHT: 65 IN | HEART RATE: 81 BPM | RESPIRATION RATE: 18 BRPM | SYSTOLIC BLOOD PRESSURE: 121 MMHG | DIASTOLIC BLOOD PRESSURE: 84 MMHG | BODY MASS INDEX: 32.76 KG/M2 | OXYGEN SATURATION: 98 % | TEMPERATURE: 97.5 F

## 2023-10-29 DIAGNOSIS — R52 POSTPARTUM PAIN (HHS-HCC): ICD-10-CM

## 2023-10-29 DIAGNOSIS — T81.89XA PROBLEM INVOLVING SURGICAL INCISION: Primary | ICD-10-CM

## 2023-10-29 LAB
BASOPHILS # BLD AUTO: 0.06 X10*3/UL (ref 0–0.1)
BASOPHILS NFR BLD AUTO: 0.4 %
EOSINOPHIL # BLD AUTO: 0.12 X10*3/UL (ref 0–0.7)
EOSINOPHIL NFR BLD AUTO: 0.8 %
ERYTHROCYTE [DISTWIDTH] IN BLOOD BY AUTOMATED COUNT: 13.2 % (ref 11.5–14.5)
HCT VFR BLD AUTO: 35.2 % (ref 36–46)
HGB BLD-MCNC: 11.7 G/DL (ref 12–16)
IMM GRANULOCYTES # BLD AUTO: 0.1 X10*3/UL (ref 0–0.7)
IMM GRANULOCYTES NFR BLD AUTO: 0.6 % (ref 0–0.9)
LYMPHOCYTES # BLD AUTO: 2.48 X10*3/UL (ref 1.2–4.8)
LYMPHOCYTES NFR BLD AUTO: 15.7 %
MCH RBC QN AUTO: 29.8 PG (ref 26–34)
MCHC RBC AUTO-ENTMCNC: 33.2 G/DL (ref 32–36)
MCV RBC AUTO: 90 FL (ref 80–100)
MONOCYTES # BLD AUTO: 0.98 X10*3/UL (ref 0.1–1)
MONOCYTES NFR BLD AUTO: 6.2 %
NEUTROPHILS # BLD AUTO: 12.1 X10*3/UL (ref 1.2–7.7)
NEUTROPHILS NFR BLD AUTO: 76.3 %
NRBC BLD-RTO: 0 /100 WBCS (ref 0–0)
PLATELET # BLD AUTO: 554 X10*3/UL (ref 150–450)
PMV BLD AUTO: 10.5 FL (ref 7.5–11.5)
RBC # BLD AUTO: 3.93 X10*6/UL (ref 4–5.2)
WBC # BLD AUTO: 15.8 X10*3/UL (ref 4.4–11.3)

## 2023-10-29 PROCEDURE — 85025 COMPLETE CBC W/AUTO DIFF WBC: CPT | Performed by: OBSTETRICS & GYNECOLOGY

## 2023-10-29 PROCEDURE — 10140 I&D HMTMA SEROMA/FLUID COLLJ: CPT

## 2023-10-29 PROCEDURE — 10060 I&D ABSCESS SIMPLE/SINGLE: CPT | Mod: GC | Performed by: STUDENT IN AN ORGANIZED HEALTH CARE EDUCATION/TRAINING PROGRAM

## 2023-10-29 PROCEDURE — 2500000005 HC RX 250 GENERAL PHARMACY W/O HCPCS

## 2023-10-29 PROCEDURE — 2500000004 HC RX 250 GENERAL PHARMACY W/ HCPCS (ALT 636 FOR OP/ED)

## 2023-10-29 PROCEDURE — 87181 SC STD AGAR DILUTION PER AGT: CPT | Mod: STJLAB | Performed by: ADVANCED PRACTICE MIDWIFE

## 2023-10-29 PROCEDURE — 74176 CT ABD & PELVIS W/O CONTRAST: CPT | Mod: FR

## 2023-10-29 PROCEDURE — 96372 THER/PROPH/DIAG INJ SC/IM: CPT

## 2023-10-29 PROCEDURE — 87070 CULTURE OTHR SPECIMN AEROBIC: CPT | Mod: STJLAB | Performed by: ADVANCED PRACTICE MIDWIFE

## 2023-10-29 PROCEDURE — 74176 CT ABD & PELVIS W/O CONTRAST: CPT | Mod: FOREIGN READ | Performed by: RADIOLOGY

## 2023-10-29 PROCEDURE — 36415 COLL VENOUS BLD VENIPUNCTURE: CPT | Performed by: OBSTETRICS & GYNECOLOGY

## 2023-10-29 PROCEDURE — 10060 I&D ABSCESS SIMPLE/SINGLE: CPT | Performed by: STUDENT IN AN ORGANIZED HEALTH CARE EDUCATION/TRAINING PROGRAM

## 2023-10-29 RX ORDER — HYDRALAZINE HYDROCHLORIDE 20 MG/ML
5 INJECTION INTRAMUSCULAR; INTRAVENOUS ONCE AS NEEDED
Status: DISCONTINUED | OUTPATIENT
Start: 2023-10-29 | End: 2023-10-29 | Stop reason: HOSPADM

## 2023-10-29 RX ORDER — NIFEDIPINE 10 MG/1
10 CAPSULE ORAL ONCE AS NEEDED
Status: DISCONTINUED | OUTPATIENT
Start: 2023-10-29 | End: 2023-10-29 | Stop reason: HOSPADM

## 2023-10-29 RX ORDER — ONDANSETRON HYDROCHLORIDE 2 MG/ML
4 INJECTION, SOLUTION INTRAVENOUS EVERY 6 HOURS PRN
Status: DISCONTINUED | OUTPATIENT
Start: 2023-10-29 | End: 2023-10-29 | Stop reason: HOSPADM

## 2023-10-29 RX ORDER — LIDOCAINE HYDROCHLORIDE 10 MG/ML
INJECTION, SOLUTION EPIDURAL; INFILTRATION; INTRACAUDAL; PERINEURAL
Status: DISCONTINUED
Start: 2023-10-29 | End: 2023-10-29 | Stop reason: HOSPADM

## 2023-10-29 RX ORDER — LABETALOL HYDROCHLORIDE 5 MG/ML
20 INJECTION, SOLUTION INTRAVENOUS ONCE AS NEEDED
Status: DISCONTINUED | OUTPATIENT
Start: 2023-10-29 | End: 2023-10-29 | Stop reason: HOSPADM

## 2023-10-29 RX ORDER — LIDOCAINE HYDROCHLORIDE 20 MG/ML
10 INJECTION, SOLUTION INFILTRATION; PERINEURAL ONCE
Status: DISCONTINUED | OUTPATIENT
Start: 2023-10-29 | End: 2023-10-29 | Stop reason: HOSPADM

## 2023-10-29 RX ORDER — OXYCODONE HYDROCHLORIDE 5 MG/1
5 TABLET ORAL EVERY 6 HOURS PRN
Qty: 5 TABLET | Refills: 0 | Status: SHIPPED | OUTPATIENT
Start: 2023-10-29 | End: 2023-11-02 | Stop reason: WASHOUT

## 2023-10-29 RX ORDER — KETOROLAC TROMETHAMINE 30 MG/ML
30 INJECTION, SOLUTION INTRAMUSCULAR; INTRAVENOUS ONCE
Status: COMPLETED | OUTPATIENT
Start: 2023-10-29 | End: 2023-10-29

## 2023-10-29 RX ORDER — LIDOCAINE HYDROCHLORIDE 10 MG/ML
0.5 INJECTION INFILTRATION; PERINEURAL ONCE AS NEEDED
Status: DISCONTINUED | OUTPATIENT
Start: 2023-10-29 | End: 2023-10-29 | Stop reason: HOSPADM

## 2023-10-29 RX ORDER — HYDROMORPHONE HYDROCHLORIDE 1 MG/ML
0.4 INJECTION, SOLUTION INTRAMUSCULAR; INTRAVENOUS; SUBCUTANEOUS ONCE
Status: COMPLETED | OUTPATIENT
Start: 2023-10-29 | End: 2023-10-29

## 2023-10-29 RX ORDER — ACETAMINOPHEN 500 MG
1000 TABLET ORAL EVERY 6 HOURS PRN
Qty: 30 TABLET | Refills: 0 | Status: SHIPPED | OUTPATIENT
Start: 2023-10-29 | End: 2023-11-30 | Stop reason: WASHOUT

## 2023-10-29 RX ORDER — ONDANSETRON 4 MG/1
4 TABLET, FILM COATED ORAL EVERY 6 HOURS PRN
Status: DISCONTINUED | OUTPATIENT
Start: 2023-10-29 | End: 2023-10-29 | Stop reason: HOSPADM

## 2023-10-29 RX ORDER — OXYCODONE HYDROCHLORIDE 5 MG/1
5 TABLET ORAL EVERY 4 HOURS PRN
Status: DISCONTINUED | OUTPATIENT
Start: 2023-10-29 | End: 2023-10-29 | Stop reason: HOSPADM

## 2023-10-29 RX ORDER — LIDOCAINE HYDROCHLORIDE 10 MG/ML
INJECTION, SOLUTION EPIDURAL; INFILTRATION; INTRACAUDAL; PERINEURAL
Status: COMPLETED
Start: 2023-10-29 | End: 2023-10-29

## 2023-10-29 RX ORDER — KETOROLAC TROMETHAMINE 30 MG/ML
INJECTION, SOLUTION INTRAMUSCULAR; INTRAVENOUS
Status: COMPLETED
Start: 2023-10-29 | End: 2023-10-29

## 2023-10-29 RX ORDER — SULFAMETHOXAZOLE AND TRIMETHOPRIM 800; 160 MG/1; MG/1
1 TABLET ORAL 2 TIMES DAILY
Qty: 14 TABLET | Refills: 0 | Status: SHIPPED | OUTPATIENT
Start: 2023-10-29 | End: 2023-11-05

## 2023-10-29 RX ORDER — LIDOCAINE HYDROCHLORIDE 10 MG/ML
INJECTION INFILTRATION; PERINEURAL
Status: DISCONTINUED
Start: 2023-10-29 | End: 2023-10-29 | Stop reason: HOSPADM

## 2023-10-29 RX ORDER — ACETAMINOPHEN 500 MG
1000 TABLET ORAL EVERY 6 HOURS PRN
Qty: 30 TABLET | Refills: 0 | Status: SHIPPED | OUTPATIENT
Start: 2023-10-29 | End: 2023-11-02 | Stop reason: WASHOUT

## 2023-10-29 RX ORDER — SULFAMETHOXAZOLE AND TRIMETHOPRIM 800; 160 MG/1; MG/1
160 TABLET ORAL EVERY 12 HOURS SCHEDULED
Status: DISCONTINUED | OUTPATIENT
Start: 2023-10-29 | End: 2023-10-29 | Stop reason: HOSPADM

## 2023-10-29 RX ORDER — HYDROMORPHONE HYDROCHLORIDE 1 MG/ML
INJECTION, SOLUTION INTRAMUSCULAR; INTRAVENOUS; SUBCUTANEOUS
Status: COMPLETED
Start: 2023-10-29 | End: 2023-10-29

## 2023-10-29 RX ADMIN — KETOROLAC TROMETHAMINE 30 MG: 30 INJECTION, SOLUTION INTRAMUSCULAR at 11:00

## 2023-10-29 RX ADMIN — KETOROLAC TROMETHAMINE 30 MG: 30 INJECTION, SOLUTION INTRAMUSCULAR; INTRAVENOUS at 11:00

## 2023-10-29 RX ADMIN — LIDOCAINE HYDROCHLORIDE 20 MG: 10 INJECTION, SOLUTION EPIDURAL; INFILTRATION; INTRACAUDAL; PERINEURAL at 11:05

## 2023-10-29 RX ADMIN — HYDROMORPHONE HYDROCHLORIDE 0.4 MG: 1 INJECTION, SOLUTION INTRAMUSCULAR; INTRAVENOUS; SUBCUTANEOUS at 11:00

## 2023-10-29 ASSESSMENT — PAIN SCALES - GENERAL
PAINLEVEL_OUTOF10: 8
PAINLEVEL_OUTOF10: 7

## 2023-10-29 NOTE — H&P
Obstetrical TRIAGE History and Physical    Ms. Rosalva Pineda is a 27 y.o.  who is 10d s/p 1LSTCS for NRFHT at term complicated by PPH (1.2L, atony) and EMM (s/p 24h Gent/Clinda) who presents with concern for post-op infection.    She has been recovering at home but noticed malodorous drainage from her incision this AM prompting her presentation.  She has had no fevers but has been taking tylenol/ ibuprofen for pain.  She reports erythema, warmth,and tenderness around her incision as well.   Has had intermittent nausea but no emesis. Having regular Bms. No change to light lochia.    Breastfeeding.    Assessment and Plan  Ms. Rosalva Pineda is a 27 y.o.  who is 10d s/p 1LSTCS for NRFHT at term complicated by PPH (1.2L, atony) and EMM (s/p 24h Gent/Clinda) who presents with SSI concern for abscess vs seroma with overlying cellulitis.   Patient overall systemically well-appearing and is afebrile.  Exam most notable for 3x3cm area of fluctuance and expression of copious malodorous fluid.  No crepitus appreciated on my exam but exam by CNM notable for audible 'hiss' with expression of fluid.      Clinical picture c/f early abscess and would recommend drainage at bedside vs OR pending CT scan.  Risk factors for infection: PPH and IAI at time of CS.    - CBC ordered  - NPO   - Culture collected   - Recommend CT AP with contrast  - I&D vs debridement in OR pending imaging  - Unfortunately, pt has hives allergy to Cefaclor > anticipate need for antibiotics (gent/clinda if IV) vs bactrim if CT without evidence of abscess     Hazel Stanton MD       Obstetrical History   OB History    Para Term  AB Living   1 1 1 0 0 1   SAB IAB Ectopic Multiple Live Births   0 0 0 0 1      # Outcome Date GA Lbr Karlo/2nd Weight Sex Delivery Anes PTL Lv   1 Term 10/19/23 39w2d 27:35 / 01:31 3670 g M CS-LTranv EPI  OFE      Complications: Fetal Intolerance      Obstetric Comments   [id number] DNP Anxiety and depression   02/23/2023 08:28 AM - [place]        No meds.   03/06/2023 08:34 AM - KD [id number] false     [id number] DNP Covid vaccinated x4 and flu vaccinated.  03/06/2023 08:34 AM - KD     [id number] DNP Declines flu vaccination.  09/20/2023 04:21 PM - KD     [id number] DNP Hx. of Asthma -- Rare PRN inhaler use.  03/06/2023 08:35 AM - KD     [id number] DNP Lactating mother  05/11/2023 11:00 AM - CM     [id number] DNP Migraine headache  02/23/2023 08:28 AM - MT     [id number] DNP Nausea and vomiting in pregnancy  04/07/2023 04:36 PM - CK     [id number] DNP Nausea in adult  03/20/2023 03:47 PM - MT     [id number] DNP Pediatrician: Dr. King  09/20/2023 03:38 PM - RM     [id number] DNP rrNIPS  04/18/2023 09:42 AM - CK        neg carrier screen   04/23/2023 09:08 PM - CK [id number] false     [id number] DNP Screening for diabetes mellitus  07/11/2023 04:24 PM - BS     [id number] DNP Starting BMI 31.  03/06/2023 08:35 AM - KD         Past Medical History  Past Medical History:   Diagnosis Date    Anxiety     Depression         Past Surgical History   Past Surgical History:   Procedure Laterality Date    OTHER SURGICAL HISTORY  05/12/2020    Tonsillectomy    TONSILECTOMY, ADENOIDECTOMY, BILATERAL MYRINGOTOMY AND TUBES         Social History  Social History     Tobacco Use    Smoking status: Never    Smokeless tobacco: Never   Substance Use Topics    Alcohol use: Not Currently     Substance and Sexual Activity   Drug Use Never       Allergies  Cefaclor     Medications  Medications Prior to Admission   Medication Sig Dispense Refill Last Dose    acetaminophen (Tylenol Extra Strength) 500 mg tablet Take 2 tablets (1,000 mg) by mouth every 6 hours if needed for mild pain (1 - 3).       blood pressure test kit-large kit 1 Units if needed (headache). 1 each 0     ibuprofen 600 mg tablet Take 1 tablet (600 mg) by mouth every 6 hours for 10 days. 20 tablet 0     prenatal vit,calc76/iron/folic (PNV 29-1 ORAL) Take by mouth.       sertraline (Zoloft) 50 mg tablet Take 1  tablet (50 mg) by mouth once daily. 30 tablet 1        Objective    Last Vitals  Temp Pulse Resp BP MAP O2 Sat   36.8 °C (98.2 °F) 110 18 125/85   99 %     Physical Examination  GENERAL: Examination reveals a well developed, well nourished, gravid female in no acute distress. She is alert and cooperative.  LUNGS: clear to auscultation bilaterally  HEART: regular rate and rhythm, S1, S2 normal, no murmur, click, rub or gallop  ABDOMEN: soft, non-distended, non-tender.  No rebound or guarding. Fudus firm and non-tender with palpation.   Fluctuance around incision (L>R), malodorous yellow-brown fluid draining from just left of midline of incision (not frankly purulent). Fluctuance ~3cm superior and inferior to incision. Mild tenderness and warmth with palpation.  No crepitus appreciated.     Lab Review  CBC ordered

## 2023-10-29 NOTE — PROGRESS NOTES
"Subjective:  Rosalva Pineda is presenting for incision pain and drainage  Drainage as of this morning, associated odor  Denies fever or chills, has had some hot/cold episodes  Vaginal bleeding moderate    Objective:    Vitals:    10/29/23 0617   BP: 125/85   Pulse: 110   Resp: 18   Temp: 36.8 °C (98.2 °F)   TempSrc: Oral   SpO2: 99%   Weight: 89.2 kg (196 lb 10.4 oz)   Height: 1.651 m (5' 5\")      Physical Exam  Constitutional:       General: She is not in acute distress.     Appearance: She is not ill-appearing or toxic-appearing.   Pulmonary:      Effort: Pulmonary effort is normal.   Abdominal:      General: Abdomen is flat.      Palpations: Abdomen is soft.      Tenderness: There is abdominal tenderness.          Comments: 3cm light pink block with fluctuance  Tenderness surrounding incision without guarding  Drainage from 3mm opening in incision, the drainage is yellow serous     Neurological:      General: No focal deficit present.      Mental Status: She is alert.   Psychiatric:         Mood and Affect: Mood is anxious.          Assessment/Plan:  27 y.o.  at postoperative day 10  Wound infection with drainage  Afebrile  Consulted Dr Bowden who developed assessment and plan    "

## 2023-10-29 NOTE — DISCHARGE INSTRUCTIONS
Call Dr. Mcfadden's office for any problems and/or concerns    *Walk as much as possible, it is ok to use stairs carefully  *Ok to shower, avoid soaking in tub baths or swimming for 6 weeks after surgery. Keep incision clean and dry, remove any soaked gauze. Do not apply occlusive dressings.   *Continue the coughing and deep breathing exercises that your learned in the hospital  *No lifting/straining and avoid constipation for 6 weeks from delivery (Avoid strenuous activity). You may hold/lift/carry baby.   *Prevent constipation by using stool softeners and staying hydrated, so that you do not strain against your stitches or have pain from constipation  *Vaginal rest for 6 weeks (Do not put anything in your vagina except prescribed vaginal estrogen. Do not use tampons or douches. Do not have sex until cleared by physician)    Call Doctor right away for:    *Fever above 100.4/shaking chills  *Bright red vaginal bleeding or bleeding that soaks more than one sanitary pad per hour  *A foul smelling discharge from the vagina  *Trouble urinating or burning with urination  *Severe pain or bloating in your abdomen  *Persistent nausea/vomiting  *Redness, swelling, or drainage at your incision sites  *Chest pain/shortness of breath-call 911.    - You will be going home with prescriptions for pain medication. If you are able to take them, alternate a dose of Acetaminophen (Tylenol) and Ibuprofen (Motrin) every 3 hours. (For example, 1000mg of Tylenol at 09:00am, 600mg ibuprofen at 12:00pm, 1000mg of Tylenol at 3:00pm, 600mg ibuprofen at 6:00pm).   - Use any prescribed narcotic (ie oxycodone) for breakthrough pain as prescribed.   - Use a stool softener, such as Miralax, to prevent constipation from the narcotic medication. If you are going home with a prescription for estrogen cream, use vaginally as prescribed nightly until your 2 week post-op visit.

## 2023-10-29 NOTE — PROCEDURES
Date:  10/29/23  OR Location:  Triage 4    Name: Rosalva Pineda, : 1995, Age: 27 y.o., MRN: [id number], Sex: female    Diagnosis  Incisional seroma  Incisional seroma     Procedures  Incision and Drainage   Surgeons   Mbale Rae MD    Resident/Fellow/Other Assistant:  Greta Cheek SA    Procedure Summary  Anesthesia: * No surgery found *  ASA: ASA status cannot be found on the log.  Anesthesia Staff: Anesthesia staff cannot be found from this context.  Estimated Blood Loss: 5mL  Intra-op Medications:   30mg IVP Toradol   0.4mg IVP Dilaudid  15cc 1% plain Lidocaine    Specimen: None    Procedure Detail  After discussion and repeat examination with the patient at bedside regarding incisional drainage most consistent with incisional seroma. Imaging was negative for underlying infection intra-abdominally or involving the fascia. Leukocytosis is resolving. Vitals within normal limits including afebrile and normocardic. Recommendation for incision and drainage to allow further drainage of the seroma in addition to antibiotics to minimize risk of subsequent infection. All questions answered and patient desired to proceed.   With the patient in supine position, the abdominal incision was prepared with Chlorhexidine. Local anesthesia was applied to the right corner of the incision and a 1cm stab incision with the #11 blade was made. The opening was probed with no evidence of tracking or fascial involvement. The opening was copiously irrigated with sterile saline with resultant drainage from two separate areas more medial along the incision. The same process was repeated with stab incision to extend area of spontaneous light yellow serous drainage following further application of local anesthetic. Darker serous drainage without obvious purulence was initially noted at the most midline opening. Fascia was probed for and found deep from incision and in tact at all sites. Once all three 1cm openings were  copiously irrigated and clear drainage was noted with excellent hemostasis the area was cleaned with sterile saline superficially and loose gauze bandaging applied. The patient tolerated the procedure well. She was provided oral analgesics and antibiotics for 7 days with instructions for close follow up with her doctor's office this week.

## 2023-10-29 NOTE — NURSING NOTE
Written and verbal {discharge or discontinue?:2288524} instructions given, prescriptions sent to pts pharmacy. Pt verbalizes understanding of all instructions and [facility].

## 2023-10-29 NOTE — DISCHARGE SUMMARY
Discharge Summary    Admission Date: 10/29/2023  Discharge Date: 10/29/23      Discharge Diagnosis  Incisional sermoa     Hospital Course  Delivery Date: 10/19/2023  4:06 PM   Delivery type: , Low Transverse    GA at delivery: 39w2d  Outcome: Living   Anesthesia during delivery: Epidural   Intrapartum complications: Fetal Intolerance   Feeding method:       Procedures:  incision and drainage    Pertinent Physical Exam At Time of Discharge  Incision with gauze 2x2 taped over each of 3x 1cm openings in right side of her Pfannenstiel incision     Discharge Meds     Your medication list        START taking these medications        Instructions Last Dose Given Next Dose Due   oxyCODONE 5 mg immediate release tablet  Commonly known as: Roxicodone      Take 1 tablet (5 mg) by mouth every 6 hours if needed for moderate pain (4 - 6) or severe pain (7 - 10).       sulfamethoxazole-trimethoprim 800-160 mg tablet  Commonly known as: Bactrim DS      Take 1 tablet by mouth 2 times a day for 7 days.              CHANGE how you take these medications        Instructions Last Dose Given Next Dose Due   acetaminophen 500 mg tablet  Commonly known as: Tylenol Extra Strength  What changed: Another medication with the same name was added. Make sure you understand how and when to take each.      Take 2 tablets (1,000 mg) by mouth every 6 hours if needed for mild pain (1 - 3).       acetaminophen 500 mg tablet  Commonly known as: Tylenol Extra Strength  What changed: You were already taking a medication with the same name, and this prescription was added. Make sure you understand how and when to take each.      Take 2 tablets (1,000 mg) by mouth every 6 hours if needed for mild pain (1 - 3) for up to 7 days.              CONTINUE taking these medications        Instructions Last Dose Given Next Dose Due   blood pressure test kit-large kit      1 Units if needed (headache).       ibuprofen 600 mg tablet      Take 1 tablet (600 mg)  by mouth every 6 hours for 10 days.       PNV 29-1 ORAL           sertraline 50 mg tablet  Commonly known as: Zoloft      Take 1 tablet (50 mg) by mouth once daily.                 Where to Get Your Medications        These medications were sent to [facility]/pharmacy #[id number] - [place], [place] - [address] AT [address] [address]      Phone: [phone]   acetaminophen 500 mg tablet  acetaminophen 500 mg tablet  oxyCODONE 5 mg immediate release tablet  sulfamethoxazole-trimethoprim 800-160 mg tablet          Complications Requiring Follow-Up  Repeat examination for incisional seroma     Test Results Pending At Discharge  Pending Labs       Order Current Status    Tissue/Wound Culture/Smear In process            Outpatient Follow-Up  Future Appointments   Date Time Provider Department Center   11/30/2023  2:00 PM KUN Fine-CNM [id number] [place]   2/19/2024  9:00 AM [facility]   Close follow up with Dr Mcfadden's office for later this week.     I spent 40 minutes in the professional and overall care of this patient.      Mable Rae MD

## 2023-10-30 ENCOUNTER — PATIENT OUTREACH (OUTPATIENT)
Dept: CARE COORDINATION | Facility: CLINIC | Age: 28
End: 2023-10-30
Payer: COMMERCIAL

## 2023-10-30 NOTE — PROGRESS NOTES
Outreach call to patient to support a smooth transition of care from recent admission.  Left voicemail message for patient with my contact information.    TAMMY David   III  [facility]able [facility] Organization  Office Phone: [phone]  [email or web address]

## 2023-11-01 NOTE — PROGRESS NOTES
Subjective:    Rosalva Pineda is a 28 y.o.  now POD#14 from  section presenting for post-operative incision check. On 10/19, patient underwent pLTCS for arrest of descent and NRFHT c/b PPH (1.2L 2/2 atony) and postpartum endometritis (s/p 24hr gent/clinda). She was discharged home in stable condition after 72hr monitoring for gHTN, no meds. She was readmitted on POD#10 with concern for wound infection and underwent I&D of incisional seroma. She was discharged on a 7 day course of bactrim. Wound culture is growing streptococcus anginosus, sensitivities pending.    She denies wound discharge, bleeding, redness, or swelling. She is taking tylenol and motrin as needed for pain. She denies fevers or chills. She is on day 5 of 7 of bactrim. She is eating, drinking, voiding, and having bowel movements. Lochia improving. Breast feeding going well. BPs at home 120s/70s. Tearful and anxious, did restart zoloft. Has counseling referral.    Objective:    Vitals:    23 1307   BP: 112/78        Gen: NAD  Abd: soft, NTND  Skin: pfannenstiel incision clean, dry, and intact; no erythema, discharge, or bleeding  Neuro: alert and oriented  Psych: appropriate affect    Assessment and Plan:    Rosalva Pineda is a  now POD#14 from  section presenting for post-operative incision check.     Incisional Seroma  -s/p I&D on 10/29  -Incision c/d/I without surrounding warmth, erythema, or discharge  -Wound culture growing streptococcus anginosus, sensitive to PCN, resistant to tetracycline  -Completing 7 day course of Bactrim, currently on day 5. Culture did not test for sensitivity to bactrim. Pt with cephalosporin allergy (hives) but thinks she has had PCN in the past w/o reaction. Discussed finishing course of bactrim vs switching to PCN. Given clinically improving on Bactrim and almost done with course, will plan to complete Bactrim. If symptoms change, pt to call office and will switch to amoxicillin.      gHTN  -No meds  -BPs at home normotensive  -BP today 112/78  -Ok to stop checking BPs     Postpartum  -Feeding: breast  -Mood: EPDS 12, restarted zoloft, encouraged to establish with counselor  -Contraception: considering POPs    Dispo: Return to clinic in 4 weeks for postpartum visit    Deepika Pardo MD

## 2023-11-02 ENCOUNTER — POSTPARTUM VISIT (OUTPATIENT)
Dept: OBSTETRICS AND GYNECOLOGY | Facility: CLINIC | Age: 28
End: 2023-11-02
Payer: COMMERCIAL

## 2023-11-02 VITALS
SYSTOLIC BLOOD PRESSURE: 112 MMHG | BODY MASS INDEX: 31.99 KG/M2 | WEIGHT: 192 LBS | HEIGHT: 65 IN | DIASTOLIC BLOOD PRESSURE: 78 MMHG

## 2023-11-02 DIAGNOSIS — T81.41XA SUPERFICIAL INCISIONAL INFECTION OF SURGICAL SITE: Primary | ICD-10-CM

## 2023-11-02 DIAGNOSIS — O13.9 GESTATIONAL HYPERTENSION, ANTEPARTUM (HHS-HCC): ICD-10-CM

## 2023-11-02 PROBLEM — Z3A.39 39 WEEKS GESTATION OF PREGNANCY (HHS-HCC): Status: RESOLVED | Noted: 2023-10-18 | Resolved: 2023-11-02

## 2023-11-02 PROBLEM — Z34.93 PRENATAL CARE IN THIRD TRIMESTER (HHS-HCC): Status: RESOLVED | Noted: 2023-10-18 | Resolved: 2023-11-02

## 2023-11-02 LAB
B-LACTAMASE ORGANISM ISLT: NEGATIVE
BACTERIA SPEC CULT: ABNORMAL
BACTERIA SPEC CULT: ABNORMAL
GRAM STN SPEC: ABNORMAL
GRAM STN SPEC: ABNORMAL

## 2023-11-02 PROCEDURE — 99214 OFFICE O/P EST MOD 30 MIN: CPT | Performed by: STUDENT IN AN ORGANIZED HEALTH CARE EDUCATION/TRAINING PROGRAM

## 2023-11-02 ASSESSMENT — EDINBURGH POSTNATAL DEPRESSION SCALE (EPDS)
I HAVE BEEN SO UNHAPPY THAT I HAVE BEEN CRYING: YES, QUITE OFTEN
THE THOUGHT OF HARMING MYSELF HAS OCCURRED TO ME: NEVER
I HAVE BEEN ANXIOUS OR WORRIED FOR NO GOOD REASON: YES, SOMETIMES
I HAVE LOOKED FORWARD WITH ENJOYMENT TO THINGS: AS MUCH AS I EVER DID
TOTAL SCORE: 12
I HAVE BEEN SO UNHAPPY THAT I HAVE HAD DIFFICULTY SLEEPING: NOT AT ALL
I HAVE FELT SAD OR MISERABLE: NO, NOT AT ALL
I HAVE BLAMED MYSELF UNNECESSARILY WHEN THINGS WENT WRONG: YES, MOST OF THE TIME
I HAVE BEEN ABLE TO LAUGH AND SEE THE FUNNY SIDE OF THINGS: NOT QUITE SO MUCH NOW
THINGS HAVE BEEN GETTING ON TOP OF ME: YES, SOMETIMES I HAVEN'T BEEN COPING AS WELL AS USUAL
I HAVE FELT SCARED OR PANICKY FOR NO GOOD REASON: YES, SOMETIMES

## 2023-11-03 ENCOUNTER — POSTPARTUM VISIT (OUTPATIENT)
Dept: OBSTETRICS AND GYNECOLOGY | Facility: CLINIC | Age: 28
End: 2023-11-03
Payer: COMMERCIAL

## 2023-11-03 ENCOUNTER — TELEPHONE (OUTPATIENT)
Dept: OBSTETRICS AND GYNECOLOGY | Facility: CLINIC | Age: 28
End: 2023-11-03

## 2023-11-03 VITALS
WEIGHT: 190.38 LBS | DIASTOLIC BLOOD PRESSURE: 68 MMHG | BODY MASS INDEX: 31.68 KG/M2 | SYSTOLIC BLOOD PRESSURE: 110 MMHG

## 2023-11-03 PROCEDURE — 99213 OFFICE O/P EST LOW 20 MIN: CPT | Performed by: OBSTETRICS & GYNECOLOGY

## 2023-11-03 ASSESSMENT — EDINBURGH POSTNATAL DEPRESSION SCALE (EPDS)
I HAVE BEEN SO UNHAPPY THAT I HAVE HAD DIFFICULTY SLEEPING: NOT VERY OFTEN
I HAVE BEEN ABLE TO LAUGH AND SEE THE FUNNY SIDE OF THINGS: NOT QUITE SO MUCH NOW
THE THOUGHT OF HARMING MYSELF HAS OCCURRED TO ME: NEVER
I HAVE FELT SCARED OR PANICKY FOR NO GOOD REASON: YES, SOMETIMES
I HAVE BEEN ANXIOUS OR WORRIED FOR NO GOOD REASON: YES, SOMETIMES
I HAVE BLAMED MYSELF UNNECESSARILY WHEN THINGS WENT WRONG: YES, MOST OF THE TIME
I HAVE FELT SAD OR MISERABLE: NO, NOT AT ALL
I HAVE BEEN SO UNHAPPY THAT I HAVE BEEN CRYING: YES, QUITE OFTEN
I HAVE LOOKED FORWARD WITH ENJOYMENT TO THINGS: RATHER LESS THAN I USED TO
TOTAL SCORE: 14
THINGS HAVE BEEN GETTING ON TOP OF ME: YES, SOMETIMES I HAVEN'T BEEN COPING AS WELL AS USUAL

## 2023-11-03 NOTE — TELEPHONE ENCOUNTER
"Patient called OB line stating that she was just seen yesterday for follow up to  incision infection.  Per patient, she had 3, 1/2 in incisions opened up along  incision for I&D.  She is currently on Bactrim for infection.  Last night, while taking a shower and cleaning area she noticed a \"pin\" size hole, where she can see red \"beefy\" tissue.  She is still having yellow pus drainage but so far no odor or fever.  Patient is very concerned and would like to be seen again this morning for evaluation, especially being that its Friday.  She really doesn't want to end up back in the ER at [facility] if she doesn't have to.  She is wondering if she may need packing?      Message sent to Dr Mcfadden for advise      Patient scheduled to come in to see Dr Mcfadden at 11 am  "

## 2023-11-03 NOTE — DOCUMENTATION CLARIFICATION NOTE
"    PATIENT:               RUTHIE HIGHTOWER  ACCT #:                  [id number]  MRN:                       [id number]  :                       1995  ADMIT DATE:       10/18/2023 3:50 PM  DISCH DATE:        10/22/2023 6:20 PM  RESPONDING PROVIDER #:        [id number]          PROVIDER RESPONSE TEXT:    IAI diagnosis    CDI QUERY TEXT:    [facility]_Conflicting Documentation    Instruction:    Based on your assessment of the patient and the clinical information, please provide the requested documentation by clicking on the appropriate radio button and enter any additional information if prompted.    Question: Based on your medical judgment, can you please clarify which of these conditions is the most clinically supported    When answering this query, please exercise your independent professional judgment. The fact that a question is being asked, does not imply that any particular answer is desired or expected.    The patient's clinical indicators include:  Clinical Information: 27 yr old admitted on 10/18 for term IOL in setting of elevated BP X1 in office.  On 10/19 had  urgent  section for non reassuring fetal heart tracing, fetal tachycardia remote from delivery.  Delivery at 16:06 on 10/19.    There is conflicting documentation in the medical record which requires clarification.    -The diagnosis of IAI was documented on 10/20  by  FANI Carvajal CNM    -The diagnosis of endometritis was documented on 10/21  by Mable Rae MD    Clinical Indicators:  afebrile prior to delivery  temp max - 38.8 on 10/19 17:31    10/20 post partum progress note states \"IAI, treated with Amp/Gent\"  To complete 24hr gent/clinda given postpartum fever    10/21 progress note \"endometritis: afebrile, s/p 24hrs PP Gent/Clinda\"    Treatment:    Gent and clinda x 24 hours    Risk Factors: c section  Options provided:  -- IAI diagnosis  -- endometritis diagnosis  -- Other - I will add my own diagnosis  -- Refer to Clinical " Documentation Reviewer    Query created by: Joi Kent on 10/30/2023 11:45 AM      Electronically signed by:  LAKISHA TAYLOR MD 11/3/2023 8:20 AM

## 2023-11-03 NOTE — PROGRESS NOTES
Postpartum Visit    HPI:  Pt presents for follow up visit.  She had a c/s on 10/19 which was complicated by postpartum wound seroma for which she had an I&D on POD #10.  She is still finishing her bactrim course.  She was seen yesterday and her incision was felt to be healing well.  Today, she noticed increased drainage from her incision as well as a defect in the middle of her incision, so she presents for evaluation.     O:  /68   Wt 86.4 kg (190 lb 6 oz)   LMP 2022   Breastfeeding Yes   BMI 31.68 kg/m²     General Appearance   - consistent with stated age, well groomed and cooperative    Integumentary  - skin warm and dry without rash    Head and Neck  - normalocephalic and neck supple    Chest and Lung Exam  - normal breathing effort, no respiratory distress    Abdomen  - soft, nontender, nondistended. Incision with 2 cm linear defect in midline. Probed and tracks 2mm laterally in each direction and 15mm deep.  Draining serosanguinous fluid.  No sign of overlying cellulitis.     A/P:   Pt is a 28 y.o.  with post-op wound seroma s/p I&D in the hospital.  Still with incisional defect in midline.  No sign of infection.  Wound packed.  Teaching done to patient and support person on how to pack.  Recommended daily packing changes. F/u with me next week.  Call parameters/warning signs reviewed.

## 2023-11-08 ENCOUNTER — POSTPARTUM VISIT (OUTPATIENT)
Dept: OBSTETRICS AND GYNECOLOGY | Facility: CLINIC | Age: 28
End: 2023-11-08
Payer: COMMERCIAL

## 2023-11-08 VITALS — WEIGHT: 190 LBS | BODY MASS INDEX: 31.62 KG/M2 | DIASTOLIC BLOOD PRESSURE: 66 MMHG | SYSTOLIC BLOOD PRESSURE: 118 MMHG

## 2023-11-08 PROCEDURE — 99213 OFFICE O/P EST LOW 20 MIN: CPT | Performed by: OBSTETRICS & GYNECOLOGY

## 2023-11-08 ASSESSMENT — EDINBURGH POSTNATAL DEPRESSION SCALE (EPDS)
I HAVE FELT SAD OR MISERABLE: NO, NOT AT ALL
TOTAL SCORE: 13
I HAVE BEEN ABLE TO LAUGH AND SEE THE FUNNY SIDE OF THINGS: NOT QUITE SO MUCH NOW
I HAVE BEEN ANXIOUS OR WORRIED FOR NO GOOD REASON: YES, SOMETIMES
I HAVE BEEN SO UNHAPPY THAT I HAVE BEEN CRYING: ONLY OCCASIONALLY
I HAVE BLAMED MYSELF UNNECESSARILY WHEN THINGS WENT WRONG: YES, MOST OF THE TIME
I HAVE BEEN SO UNHAPPY THAT I HAVE HAD DIFFICULTY SLEEPING: YES, SOMETIMES
I HAVE LOOKED FORWARD WITH ENJOYMENT TO THINGS: AS MUCH AS I EVER DID
THINGS HAVE BEEN GETTING ON TOP OF ME: YES, SOMETIMES I HAVEN'T BEEN COPING AS WELL AS USUAL
THE THOUGHT OF HARMING MYSELF HAS OCCURRED TO ME: NEVER
I HAVE FELT SCARED OR PANICKY FOR NO GOOD REASON: YES, SOMETIMES

## 2023-11-08 NOTE — PROGRESS NOTES
"Postpartum Visit     HPI:  Pt presents for follow up visit/incision check.  She had a c/s on 10/19 which was complicated by postpartum wound seroma for which she had an I&D on POD #10.  She has been packing her incision daily.  She has finished her antibiotic course.   Feels it is overall better.      O:      10/29/2023     7:30 AM 10/29/2023     9:19 AM 10/29/2023    10:24 AM 10/29/2023    11:45 AM 2023     1:07 PM 11/3/2023    10:55 AM 2023     3:37 PM   Vitals   Systolic 127 126 127 121 112 110 118   Diastolic 85 83 82 84 78 68 66   Heart Rate 99 89 82 81      Temp 36.7 °C (98.1 °F) 36.5 °C (97.7 °F)  36.4 °C (97.5 °F)      Resp 16 16 16 18      Height (in)     1.651 m (5' 5\")     Weight (lb)     192 190.38 190   BMI     31.95 kg/m2 31.68 kg/m2 31.62 kg/m2   BSA (m2)     2 m2 1.99 m2 1.99 m2          General Appearance   - consistent with stated age, well groomed and cooperative     Integumentary  - skin warm and dry without rash     Head and Neck  - normalocephalic and neck supple     Chest and Lung Exam  - normal breathing effort, no respiratory distress     Abdomen  - soft, nontender, nondistended. Incision with 2 cm linear defect in midline. Packing removed.  No signs of cellulitis.  Probed and tracks approx 10 mm deep.  Repacked with iodoform and dressing placed.      A/P:   Pt is a 28 y.o.  with post-op wound seroma s/p I&D in the hospital.  Still with incisional defect in midline, improving and repacked today.  No sign of infection.   Continue daily packing changes.  Follow up in 1 wk and call earlier with any concerns.    Gabby Gallegos MD    "

## 2023-11-14 ENCOUNTER — POSTPARTUM VISIT (OUTPATIENT)
Dept: OBSTETRICS AND GYNECOLOGY | Facility: CLINIC | Age: 28
End: 2023-11-14
Payer: COMMERCIAL

## 2023-11-14 ENCOUNTER — LAB (OUTPATIENT)
Dept: LAB | Facility: LAB | Age: 28
End: 2023-11-14
Payer: COMMERCIAL

## 2023-11-14 VITALS — DIASTOLIC BLOOD PRESSURE: 64 MMHG | WEIGHT: 188 LBS | SYSTOLIC BLOOD PRESSURE: 110 MMHG | BODY MASS INDEX: 31.28 KG/M2

## 2023-11-14 DIAGNOSIS — F32.A ANXIETY AND DEPRESSION: ICD-10-CM

## 2023-11-14 DIAGNOSIS — F41.9 ANXIETY AND DEPRESSION: ICD-10-CM

## 2023-11-14 DIAGNOSIS — T81.89XA PROBLEM INVOLVING SURGICAL INCISION: Primary | ICD-10-CM

## 2023-11-14 LAB
ERYTHROCYTE [DISTWIDTH] IN BLOOD BY AUTOMATED COUNT: 13.3 % (ref 11.5–14.5)
HCT VFR BLD AUTO: 36.9 % (ref 36–46)
HGB BLD-MCNC: 11.9 G/DL (ref 12–16)
MCH RBC QN AUTO: 29.5 PG (ref 26–34)
MCHC RBC AUTO-ENTMCNC: 32.2 G/DL (ref 32–36)
MCV RBC AUTO: 92 FL (ref 80–100)
NRBC BLD-RTO: 0 /100 WBCS (ref 0–0)
PLATELET # BLD AUTO: 368 X10*3/UL (ref 150–450)
RBC # BLD AUTO: 4.03 X10*6/UL (ref 4–5.2)
WBC # BLD AUTO: 5.8 X10*3/UL (ref 4.4–11.3)

## 2023-11-14 PROCEDURE — 36415 COLL VENOUS BLD VENIPUNCTURE: CPT

## 2023-11-14 PROCEDURE — 0503F POSTPARTUM CARE VISIT: CPT | Performed by: ADVANCED PRACTICE MIDWIFE

## 2023-11-14 PROCEDURE — 85027 COMPLETE CBC AUTOMATED: CPT

## 2023-11-14 NOTE — PROGRESS NOTES
Subjective   28 y.o.  presenting for postpartum follow-up at 3 weeks    Delivery Date: 10/19/23  GA at Delivery: 39w2d  Type of Delivery:  Low Transverse  Problem List       No episode was linked to this visit.            Concerns: Patient being seen for Incision Check for 3 Week PPV   Patient stated she is only having a little drainage     Pain: controlled  Lacerations:   Lochia: No  Sexual Intimacy: No  Contraceptive Method:  Patient following up for 6 week PPV to discuss BC options.  Feeding Method: She is breast feeding exclusively. no breast or nursing problems  Lactation Consult Needed?: No    Birth Trauma: Yes, describe: Patient did not like having a .  Wound infection.  Bonding with Baby: well with Male Lafie 8 lbs 1 oz.  Mood:   Patient overwhelmed     OBJECTIVE:   General:   alert and oriented, in no acute distress and cooperative   Heart: deferred   Lungs: No dyspnea   Abdomen: soft, non-tender, without masses or organomegaly and wound is approx 1cm x1cm with healthy tissue healing by secondary intention, scant serous drainage, no surrounding fluctuance/erythema/warmth   Vulva: Deferred   Vagina:    Cervix:    Uterus:    Adnexa:        Assessment and Plan:    Postpartum follow up  Normal uterine involution.   Breastfeeding well, reviewed S&S mastitis.     Diagnoses and all orders for this visit:  Problem involving surgical incision  -healing, continue packing  -S&S infection reviewed  Gestational hypertension without significant proteinuria, postpartum  -BP WNL  Anxiety and depression  -Continue Zoloft 50mg  -Discussed expectations and strategies  -Schedule counselor    RTC 2w or PRN

## 2023-11-20 ENCOUNTER — PATIENT OUTREACH (OUTPATIENT)
Dept: CARE COORDINATION | Facility: CLINIC | Age: 28
End: 2023-11-20
Payer: COMMERCIAL

## 2023-11-20 NOTE — PROGRESS NOTES
2nd outreach attempt to patient to support a smooth transition of care from recent admission.  Left voicemail message for patient with my contact information.    TAMMY David   III  [facility]/Accountable Care Organization  Office Phone: [phone]  [email or web address]

## 2023-11-30 ENCOUNTER — POSTPARTUM VISIT (OUTPATIENT)
Dept: OBSTETRICS AND GYNECOLOGY | Facility: CLINIC | Age: 28
End: 2023-11-30
Payer: COMMERCIAL

## 2023-11-30 VITALS — BODY MASS INDEX: 31.12 KG/M2 | WEIGHT: 187 LBS | SYSTOLIC BLOOD PRESSURE: 112 MMHG | DIASTOLIC BLOOD PRESSURE: 76 MMHG

## 2023-11-30 DIAGNOSIS — F41.9 ANXIETY AND DEPRESSION: ICD-10-CM

## 2023-11-30 DIAGNOSIS — F32.A ANXIETY AND DEPRESSION: ICD-10-CM

## 2023-11-30 DIAGNOSIS — Z30.09 BIRTH CONTROL COUNSELING: ICD-10-CM

## 2023-11-30 PROCEDURE — 99214 OFFICE O/P EST MOD 30 MIN: CPT

## 2023-11-30 RX ORDER — SERTRALINE HYDROCHLORIDE 50 MG/1
50 TABLET, FILM COATED ORAL DAILY
Qty: 30 TABLET | Refills: 1 | Status: SHIPPED | OUTPATIENT
Start: 2023-11-30 | End: 2024-02-23 | Stop reason: ALTCHOICE

## 2023-11-30 ASSESSMENT — EDINBURGH POSTNATAL DEPRESSION SCALE (EPDS)
I HAVE BEEN ABLE TO LAUGH AND SEE THE FUNNY SIDE OF THINGS: NOT QUITE SO MUCH NOW
I HAVE BLAMED MYSELF UNNECESSARILY WHEN THINGS WENT WRONG: YES, MOST OF THE TIME
I HAVE BEEN SO UNHAPPY THAT I HAVE HAD DIFFICULTY SLEEPING: NOT AT ALL
I HAVE BEEN ANXIOUS OR WORRIED FOR NO GOOD REASON: YES, SOMETIMES
I HAVE FELT SAD OR MISERABLE: NO, NOT AT ALL
I HAVE FELT SCARED OR PANICKY FOR NO GOOD REASON: YES, SOMETIMES
TOTAL SCORE: 10
THINGS HAVE BEEN GETTING ON TOP OF ME: NO, MOST OF THE TIME I HAVE COPED QUITE WELL
THE THOUGHT OF HARMING MYSELF HAS OCCURRED TO ME: NEVER
I HAVE LOOKED FORWARD WITH ENJOYMENT TO THINGS: AS MUCH AS I EVER DID
I HAVE BEEN SO UNHAPPY THAT I HAVE BEEN CRYING: ONLY OCCASIONALLY

## 2023-11-30 ASSESSMENT — ENCOUNTER SYMPTOMS
RESPIRATORY NEGATIVE: 0
GASTROINTESTINAL NEGATIVE: 0
NEUROLOGICAL NEGATIVE: 0
ENDOCRINE NEGATIVE: 0
CONSTITUTIONAL NEGATIVE: 0
CARDIOVASCULAR NEGATIVE: 0
ALLERGIC/IMMUNOLOGIC NEGATIVE: 0
MUSCULOSKELETAL NEGATIVE: 0
PSYCHIATRIC NEGATIVE: 0
EYES NEGATIVE: 0
HEMATOLOGIC/LYMPHATIC NEGATIVE: 0

## 2023-11-30 NOTE — PROGRESS NOTES
"Subjective   28 y.o.  presenting for postpartum follow-up.     Delivery Date: 10/19/2023  GA at Delivery: 39w2d  Type of Delivery: C/S, Low transverse, arrest of descent and NRFHT.  - gHTN  - PPH   - PP Endometritis    Problem List       No episode was linked to this visit.          PP Wound Seroma -- I&D on POD #10; s/p antibiotic course.    Concerns: None today.    Pain: controlled  Lochia: Light, minimal spotting per patient.  Sexual Intimacy: No  Contraceptive Method: IUD  Feeding Method: She is breast feeding exclusively. no breast or nursing problems  Lactation Consult Needed?: No    Birth Trauma: Yes, describe: Unplanned C/S and PP infection/hemorrhage.  Bonding with Baby: well with baby boy.  Mood:   Postpartum Depression: High Risk (2023)    Hector  Depression Scale     Last EPDS Total Score: 10     Last EPDS Self Harm Result: Never     Last pap: 2021, normal HPV neg.    Physical Exam  Constitutional:       Appearance: Normal appearance.   HENT:      Head: Normocephalic.   Cardiovascular:      Rate and Rhythm: Normal rate and regular rhythm.   Pulmonary:      Effort: Pulmonary effort is normal.   Abdominal:      Palpations: Abdomen is soft.   Neurological:      Mental Status: She is alert and oriented to person, place, and time.   Skin:     General: Skin is warm and dry.      Comments: Abdominal incision well-approximated, no incisional drainage, surrounding erythema or edema.    Psychiatric:         Mood and Affect: Mood normal.        Plan   Encouraged visit with YANNI Conley and birth trauma counselor/specialist.  Patient satisfied on current dosage of Zoloft, \"I'm sometimes not good about taking it every day,\" no thoughts of self-harm or harming others, EPDS 10.   Advised to call office for breast complaints, abnormal bleeding, mood changes, or other concerning symptoms.   Cleared to resume normal activity as desired.  Encouraged pelvic floor exercises and Kegels.   Birth control " counseling provided; patient elects Mirena IUD --   Discussed that IUDs have an unintended pregnancy rate of approximately 1%, similar to permanent sterilization. Mechanism of action and duration of therapy was discussed. Expected side effects of pain and irregular spotting for the first 3-6 months were reviewed. We talked about risks such as expulsion, migration, perforation, and PID with untreated gonorrhea/chlamydia at the time of insertion. Safe sexual practices were encouraged.   Advised that patient remain abstinent for two weeks prior to IUD insertion date, as well as to take 600-800 mg. of ibuprofen prior to scheduled appointment.    Patient reports understanding, all questions answered.     F/U for Mirena IUD insertion.     Lexi Hsieh, KUN-DIXON

## 2023-12-07 ENCOUNTER — TELEPHONE (OUTPATIENT)
Dept: OBSTETRICS AND GYNECOLOGY | Facility: CLINIC | Age: 28
End: 2023-12-07

## 2023-12-07 ENCOUNTER — PROCEDURE VISIT (OUTPATIENT)
Dept: OBSTETRICS AND GYNECOLOGY | Facility: CLINIC | Age: 28
End: 2023-12-07
Payer: COMMERCIAL

## 2023-12-07 VITALS — DIASTOLIC BLOOD PRESSURE: 82 MMHG | BODY MASS INDEX: 30.87 KG/M2 | SYSTOLIC BLOOD PRESSURE: 116 MMHG | WEIGHT: 185.5 LBS

## 2023-12-07 DIAGNOSIS — Z30.430 ENCOUNTER FOR IUD INSERTION: Primary | ICD-10-CM

## 2023-12-07 DIAGNOSIS — T83.32XD INTRAUTERINE CONTRACEPTIVE DEVICE THREADS LOST, SUBSEQUENT ENCOUNTER: Primary | ICD-10-CM

## 2023-12-07 DIAGNOSIS — F41.8 POSTPARTUM ANXIETY (HHS-HCC): ICD-10-CM

## 2023-12-07 LAB — PREGNANCY TEST URINE, POC: NEGATIVE

## 2023-12-07 PROCEDURE — 58300 INSERT INTRAUTERINE DEVICE: CPT

## 2023-12-07 PROCEDURE — 81025 URINE PREGNANCY TEST: CPT

## 2023-12-07 ASSESSMENT — EDINBURGH POSTNATAL DEPRESSION SCALE (EPDS)
I HAVE BLAMED MYSELF UNNECESSARILY WHEN THINGS WENT WRONG: YES, MOST OF THE TIME
I HAVE BEEN ANXIOUS OR WORRIED FOR NO GOOD REASON: YES, SOMETIMES
I HAVE FELT SCARED OR PANICKY FOR NO GOOD REASON: YES, SOMETIMES
I HAVE BEEN SO UNHAPPY THAT I HAVE BEEN CRYING: YES, QUITE OFTEN
I HAVE LOOKED FORWARD WITH ENJOYMENT TO THINGS: RATHER LESS THAN I USED TO
THE THOUGHT OF HARMING MYSELF HAS OCCURRED TO ME: NEVER
I HAVE BEEN SO UNHAPPY THAT I HAVE HAD DIFFICULTY SLEEPING: NOT AT ALL
THINGS HAVE BEEN GETTING ON TOP OF ME: NO, MOST OF THE TIME I HAVE COPED QUITE WELL
I HAVE FELT SAD OR MISERABLE: NO, NOT AT ALL
I HAVE BEEN ABLE TO LAUGH AND SEE THE FUNNY SIDE OF THINGS: NOT QUITE SO MUCH NOW
TOTAL SCORE: 12

## 2023-12-07 NOTE — TELEPHONE ENCOUNTER
Patient called in wanting to ask a question about IUD. Was just seen and comfortable talking to MA about question.

## 2023-12-07 NOTE — TELEPHONE ENCOUNTER
I spoke with Rosalva, and she said that she is having some pressure, and bleeding since having the IUD placed. She states that she filled a pad since leaving the office. I informed her that if the pressure gets worse, if she is having abdominal pain, and/ or if her bleeding becomes worse (going through a pad an hour) to please let us know. Please advise. Thank you.
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
## 2023-12-12 ENCOUNTER — OFFICE VISIT (OUTPATIENT)
Dept: PRIMARY CARE | Facility: CLINIC | Age: 28
End: 2023-12-12
Payer: COMMERCIAL

## 2023-12-12 VITALS
HEART RATE: 88 BPM | SYSTOLIC BLOOD PRESSURE: 122 MMHG | WEIGHT: 189 LBS | DIASTOLIC BLOOD PRESSURE: 80 MMHG | BODY MASS INDEX: 31.45 KG/M2 | OXYGEN SATURATION: 98 % | RESPIRATION RATE: 18 BRPM | TEMPERATURE: 98.3 F

## 2023-12-12 DIAGNOSIS — J02.9 SORE THROAT: ICD-10-CM

## 2023-12-12 LAB — POC RAPID STREP: NEGATIVE

## 2023-12-12 PROCEDURE — 99213 OFFICE O/P EST LOW 20 MIN: CPT | Performed by: NURSE PRACTITIONER

## 2023-12-12 PROCEDURE — 87081 CULTURE SCREEN ONLY: CPT

## 2023-12-12 PROCEDURE — 87880 STREP A ASSAY W/OPTIC: CPT | Performed by: NURSE PRACTITIONER

## 2023-12-12 PROCEDURE — 1036F TOBACCO NON-USER: CPT | Performed by: NURSE PRACTITIONER

## 2023-12-12 ASSESSMENT — ENCOUNTER SYMPTOMS: SORE THROAT: 1

## 2023-12-12 NOTE — PROGRESS NOTES
Subjective   Patient ID: Rosalva Pineda is a 28 y.o. female who presents for Sore Throat. Negative home Covid test  Sore Throat   The current episode started yesterday. There has been no fever. Pertinent negatives include no congestion.    Review of Systems   HENT:  Positive for sore throat. Negative for congestion.    Objective   /80   Pulse 88   Temp 36.8 °C (98.3 °F)   Resp 18   Wt 85.7 kg (189 lb)   SpO2 98%   Breastfeeding Yes   BMI 31.45 kg/m²   Physical Exam  Vitals reviewed.   Constitutional:       Appearance: Normal appearance.   HENT:      Head: Normocephalic.      Right Ear: Tympanic membrane normal.      Left Ear: No tenderness. A middle ear effusion is present. No mastoid tenderness. Tympanic membrane is not erythematous.      Mouth/Throat:      Mouth: Mucous membranes are moist.      Pharynx: Posterior oropharyngeal erythema present. No oropharyngeal exudate.   Eyes:      Conjunctiva/sclera: Conjunctivae normal.   Cardiovascular:      Rate and Rhythm: Normal rate and regular rhythm.      Pulses: Normal pulses.   Pulmonary:      Breath sounds: Normal breath sounds.   Abdominal:      General: Bowel sounds are normal.      Palpations: Abdomen is soft.   Musculoskeletal:      Cervical back: Neck supple.   Skin:     General: Skin is warm and dry.   Neurological:      General: No focal deficit present.      Mental Status: She is alert.   Psychiatric:         Mood and Affect: Mood normal.         Thought Content: Thought content normal.     Assessment/Plan   1. Sore throat  POCT Rapid Strep A manually resulted    Group A Streptococcus, Culture      Rapid strep negative. Will send back-up.    Increase oral fluids/water, at least eight 8-oz glasses/day.  Get plenty of rest.  Cepacol lozenges and/or Chloraseptic spray PRN for sore throat. Salt water gargle or broth for comfort.  Alternate Tylenol/Ibuprofen PRN for body aches and/or fever  Saline nasal spray PRN for  rhinitis.  Guaifenessin/Guaifenissin DM PRN for cough  Flonase nasal spray.

## 2023-12-13 ENCOUNTER — OFFICE VISIT (OUTPATIENT)
Dept: OBSTETRICS AND GYNECOLOGY | Facility: CLINIC | Age: 28
End: 2023-12-13
Payer: COMMERCIAL

## 2023-12-13 ENCOUNTER — ANCILLARY PROCEDURE (OUTPATIENT)
Dept: RADIOLOGY | Facility: CLINIC | Age: 28
End: 2023-12-13
Payer: COMMERCIAL

## 2023-12-13 VITALS — WEIGHT: 188.5 LBS | BODY MASS INDEX: 31.37 KG/M2 | SYSTOLIC BLOOD PRESSURE: 106 MMHG | DIASTOLIC BLOOD PRESSURE: 70 MMHG

## 2023-12-13 DIAGNOSIS — T83.32XA IUD STRINGS LOST: ICD-10-CM

## 2023-12-13 DIAGNOSIS — R10.30 LOWER ABDOMINAL PAIN: Primary | ICD-10-CM

## 2023-12-13 PROCEDURE — 1036F TOBACCO NON-USER: CPT

## 2023-12-13 PROCEDURE — 76830 TRANSVAGINAL US NON-OB: CPT | Performed by: RADIOLOGY

## 2023-12-13 PROCEDURE — 76856 US EXAM PELVIC COMPLETE: CPT | Performed by: RADIOLOGY

## 2023-12-13 PROCEDURE — 76830 TRANSVAGINAL US NON-OB: CPT

## 2023-12-13 PROCEDURE — 99213 OFFICE O/P EST LOW 20 MIN: CPT

## 2023-12-13 ASSESSMENT — EDINBURGH POSTNATAL DEPRESSION SCALE (EPDS)
I HAVE BLAMED MYSELF UNNECESSARILY WHEN THINGS WENT WRONG: YES, MOST OF THE TIME
I HAVE BEEN ANXIOUS OR WORRIED FOR NO GOOD REASON: YES, SOMETIMES
I HAVE LOOKED FORWARD WITH ENJOYMENT TO THINGS: RATHER LESS THAN I USED TO
I HAVE BEEN SO UNHAPPY THAT I HAVE BEEN CRYING: ONLY OCCASIONALLY
I HAVE BEEN ABLE TO LAUGH AND SEE THE FUNNY SIDE OF THINGS: AS MUCH AS I ALWAYS COULD
I HAVE BEEN SO UNHAPPY THAT I HAVE HAD DIFFICULTY SLEEPING: NOT AT ALL
I HAVE FELT SCARED OR PANICKY FOR NO GOOD REASON: NO, NOT AT ALL
TOTAL SCORE: 7
I HAVE FELT SAD OR MISERABLE: NO, NOT AT ALL
THINGS HAVE BEEN GETTING ON TOP OF ME: NO, I HAVE BEEN COPING AS WELL AS EVER
THE THOUGHT OF HARMING MYSELF HAS OCCURRED TO ME: NEVER

## 2023-12-13 ASSESSMENT — ENCOUNTER SYMPTOMS
ALLERGIC/IMMUNOLOGIC NEGATIVE: 0
ENDOCRINE NEGATIVE: 0
CONSTITUTIONAL NEGATIVE: 0
ABDOMINAL PAIN: 1
CARDIOVASCULAR NEGATIVE: 0
RESPIRATORY NEGATIVE: 0
PSYCHIATRIC NEGATIVE: 0
EYES NEGATIVE: 0
MUSCULOSKELETAL NEGATIVE: 0
HEMATOLOGIC/LYMPHATIC NEGATIVE: 0
NEUROLOGICAL NEGATIVE: 0
GASTROINTESTINAL NEGATIVE: 0

## 2023-12-13 NOTE — PROGRESS NOTES
"Subjective   Patient ID: Rosalva Pineda is a 28 y.o. female who presents for Abdominal Pain (Low abdominal shooting paint that is intermittent since last week. Concerned that her  may be infected again. /She is still having bleeding, slightly better (spotting intermittently)/Mirena placed 2023.).    Abdominal Pain    Patient presents to the office today with concern for lower abdominal and pelvic pain.  Had a Mirena IUD placed last week; states that her sister told her that her IUD may have \"implanted in her uterus.\"  Is also concerned that her incision from her C/S may be infected again, given her lower abdominal pain.  Describes the pain as intermittent and \"random,\" lasts only for a few minutes when it comes on, is sharp and shooting.  Denies any fever, chills, body aches.  Denies any incisional drainage.  Having occasional vaginal spotting since IUD insertion.  Is tearful, states that she feels that she is being \"obsessive\" over her symptoms after having a traumatic birth and postpartum course.    Review of Systems   Gastrointestinal:  Positive for abdominal pain.   All other systems reviewed and are negative.      Objective   Physical Exam  Constitutional:       Appearance: Normal appearance.   HENT:      Head: Normocephalic.   Pulmonary:      Effort: Pulmonary effort is normal.   Genitourinary:     General: Normal vulva.      Vagina: Vaginal discharge present.      Cervix: Normal.      Comments: IUD strings not seen on speculum exam; on attempt to use broom to visualize, patient refuses.    Skin:     General: Skin is warm and dry.      Comments: Incision well-approximated; no surrounding erythema, no discharge, no edema.     Neurological:      Mental Status: She is alert and oriented to person, place, and time.   Psychiatric:         Mood and Affect: Mood normal.         Assessment/Plan   Diagnoses and all orders for this visit:  Lower abdominal pain  IUD strings lost  -     US PELVIS " TRANSABDOMINAL WITH TRANSVAGINAL; Future    IUD strings not visualized on exam today; likely curled up into the cervix, though patient refuses my attempt to locate strings using cervical broom tool.  Pelvic US ordered and patient advised to schedule.    Discussed possible etiologies for intermittent lower abdominal pain, including scar tissue from CS, malposition of IUD.  Discussed possible management for both etiologies.  Encouraged use of heating pad, ibuprofen as needed.     Patient reports understanding, all questions answered.  Will await imaging report and impressions from pelvic US and call patient with result.   If IUD malpositioned, will consider removal and replacement.    Lexi Hsieh, KUN-DIXON 12/13/23 8:51 AM

## 2023-12-14 ENCOUNTER — HOSPITAL ENCOUNTER (EMERGENCY)
Facility: HOSPITAL | Age: 28
Discharge: HOME | End: 2023-12-15
Payer: COMMERCIAL

## 2023-12-14 DIAGNOSIS — T83.9XXA COMPLICATION OF INTRAUTERINE DEVICE (IUD), UNSPECIFIED COMPLICATION, INITIAL ENCOUNTER (CMS-HCC): Primary | ICD-10-CM

## 2023-12-14 PROCEDURE — 99284 EMERGENCY DEPT VISIT MOD MDM: CPT

## 2023-12-14 PROCEDURE — 96375 TX/PRO/DX INJ NEW DRUG ADDON: CPT

## 2023-12-14 PROCEDURE — 96374 THER/PROPH/DIAG INJ IV PUSH: CPT

## 2023-12-14 ASSESSMENT — LIFESTYLE VARIABLES
EVER FELT BAD OR GUILTY ABOUT YOUR DRINKING: NO
HAVE PEOPLE ANNOYED YOU BY CRITICIZING YOUR DRINKING: NO
EVER HAD A DRINK FIRST THING IN THE MORNING TO STEADY YOUR NERVES TO GET RID OF A HANGOVER: NO
HAVE YOU EVER FELT YOU SHOULD CUT DOWN ON YOUR DRINKING: NO

## 2023-12-14 ASSESSMENT — PAIN SCALES - GENERAL: PAINLEVEL_OUTOF10: 10 - WORST POSSIBLE PAIN

## 2023-12-14 ASSESSMENT — COLUMBIA-SUICIDE SEVERITY RATING SCALE - C-SSRS
2. HAVE YOU ACTUALLY HAD ANY THOUGHTS OF KILLING YOURSELF?: NO
1. IN THE PAST MONTH, HAVE YOU WISHED YOU WERE DEAD OR WISHED YOU COULD GO TO SLEEP AND NOT WAKE UP?: NO
6. HAVE YOU EVER DONE ANYTHING, STARTED TO DO ANYTHING, OR PREPARED TO DO ANYTHING TO END YOUR LIFE?: NO

## 2023-12-14 ASSESSMENT — PAIN - FUNCTIONAL ASSESSMENT: PAIN_FUNCTIONAL_ASSESSMENT: 0-10

## 2023-12-15 ENCOUNTER — TELEPHONE (OUTPATIENT)
Dept: OBSTETRICS AND GYNECOLOGY | Facility: CLINIC | Age: 28
End: 2023-12-15

## 2023-12-15 ENCOUNTER — APPOINTMENT (OUTPATIENT)
Dept: RADIOLOGY | Facility: HOSPITAL | Age: 28
End: 2023-12-15
Payer: COMMERCIAL

## 2023-12-15 ENCOUNTER — TELEPHONE (OUTPATIENT)
Dept: PRIMARY CARE | Facility: CLINIC | Age: 28
End: 2023-12-15

## 2023-12-15 ENCOUNTER — TELEMEDICINE (OUTPATIENT)
Dept: PRIMARY CARE | Facility: CLINIC | Age: 28
End: 2023-12-15
Payer: COMMERCIAL

## 2023-12-15 VITALS
HEIGHT: 65 IN | SYSTOLIC BLOOD PRESSURE: 130 MMHG | BODY MASS INDEX: 31.32 KG/M2 | HEART RATE: 72 BPM | DIASTOLIC BLOOD PRESSURE: 82 MMHG | WEIGHT: 188 LBS | OXYGEN SATURATION: 99 % | TEMPERATURE: 97.7 F | RESPIRATION RATE: 15 BRPM

## 2023-12-15 DIAGNOSIS — Z30.09 BIRTH CONTROL COUNSELING: Primary | ICD-10-CM

## 2023-12-15 DIAGNOSIS — J02.9 PHARYNGITIS, UNSPECIFIED ETIOLOGY: Primary | ICD-10-CM

## 2023-12-15 DIAGNOSIS — N93.9 ABNORMAL UTERINE BLEEDING (AUB): ICD-10-CM

## 2023-12-15 LAB
ANION GAP SERPL CALC-SCNC: 11 MMOL/L (ref 10–20)
BASOPHILS # BLD AUTO: 0.03 X10*3/UL (ref 0–0.1)
BASOPHILS NFR BLD AUTO: 0.3 %
BUN SERPL-MCNC: 13 MG/DL (ref 6–23)
CALCIUM SERPL-MCNC: 8.5 MG/DL (ref 8.6–10.3)
CHLORIDE SERPL-SCNC: 107 MMOL/L (ref 98–107)
CO2 SERPL-SCNC: 27 MMOL/L (ref 21–32)
CREAT SERPL-MCNC: 0.51 MG/DL (ref 0.5–1.05)
EOSINOPHIL # BLD AUTO: 0.19 X10*3/UL (ref 0–0.7)
EOSINOPHIL NFR BLD AUTO: 1.9 %
ERYTHROCYTE [DISTWIDTH] IN BLOOD BY AUTOMATED COUNT: 13.2 % (ref 11.5–14.5)
FLUAV RNA RESP QL NAA+PROBE: NOT DETECTED
FLUBV RNA RESP QL NAA+PROBE: NOT DETECTED
GFR SERPL CREATININE-BSD FRML MDRD: >90 ML/MIN/1.73M*2
GLUCOSE SERPL-MCNC: 91 MG/DL (ref 74–99)
HCT VFR BLD AUTO: 33.3 % (ref 36–46)
HETEROPH AB SERPLBLD QL IA.RAPID: NEGATIVE
HGB BLD-MCNC: 10.9 G/DL (ref 12–16)
IMM GRANULOCYTES # BLD AUTO: 0.02 X10*3/UL (ref 0–0.7)
IMM GRANULOCYTES NFR BLD AUTO: 0.2 % (ref 0–0.9)
LYMPHOCYTES # BLD AUTO: 2.46 X10*3/UL (ref 1.2–4.8)
LYMPHOCYTES NFR BLD AUTO: 24.2 %
MCH RBC QN AUTO: 29.5 PG (ref 26–34)
MCHC RBC AUTO-ENTMCNC: 32.7 G/DL (ref 32–36)
MCV RBC AUTO: 90 FL (ref 80–100)
MONOCYTES # BLD AUTO: 0.85 X10*3/UL (ref 0.1–1)
MONOCYTES NFR BLD AUTO: 8.4 %
NEUTROPHILS # BLD AUTO: 6.62 X10*3/UL (ref 1.2–7.7)
NEUTROPHILS NFR BLD AUTO: 65 %
NRBC BLD-RTO: 0 /100 WBCS (ref 0–0)
PLATELET # BLD AUTO: 276 X10*3/UL (ref 150–450)
POTASSIUM SERPL-SCNC: 3.2 MMOL/L (ref 3.5–5.3)
RBC # BLD AUTO: 3.69 X10*6/UL (ref 4–5.2)
S PYO THROAT QL CULT: NORMAL
SARS-COV-2 RNA RESP QL NAA+PROBE: NOT DETECTED
SODIUM SERPL-SCNC: 142 MMOL/L (ref 136–145)
WBC # BLD AUTO: 10.2 X10*3/UL (ref 4.4–11.3)

## 2023-12-15 PROCEDURE — 2500000004 HC RX 250 GENERAL PHARMACY W/ HCPCS (ALT 636 FOR OP/ED): Performed by: NURSE PRACTITIONER

## 2023-12-15 PROCEDURE — 74176 CT ABD & PELVIS W/O CONTRAST: CPT

## 2023-12-15 PROCEDURE — 74176 CT ABD & PELVIS W/O CONTRAST: CPT | Performed by: RADIOLOGY

## 2023-12-15 PROCEDURE — 36415 COLL VENOUS BLD VENIPUNCTURE: CPT | Performed by: NURSE PRACTITIONER

## 2023-12-15 PROCEDURE — 87636 SARSCOV2 & INF A&B AMP PRB: CPT | Performed by: NURSE PRACTITIONER

## 2023-12-15 PROCEDURE — 80048 BASIC METABOLIC PNL TOTAL CA: CPT | Performed by: NURSE PRACTITIONER

## 2023-12-15 PROCEDURE — 99213 OFFICE O/P EST LOW 20 MIN: CPT | Performed by: FAMILY MEDICINE

## 2023-12-15 PROCEDURE — 85025 COMPLETE CBC W/AUTO DIFF WBC: CPT | Performed by: NURSE PRACTITIONER

## 2023-12-15 PROCEDURE — 86308 HETEROPHILE ANTIBODY SCREEN: CPT | Performed by: NURSE PRACTITIONER

## 2023-12-15 RX ORDER — NORETHINDRONE 0.35 MG/1
1 TABLET ORAL DAILY
Qty: 84 TABLET | Refills: 1 | Status: SHIPPED | OUTPATIENT
Start: 2023-12-15 | End: 2023-12-15 | Stop reason: ALTCHOICE

## 2023-12-15 RX ORDER — HYDROCODONE BITARTRATE AND ACETAMINOPHEN 5; 325 MG/1; MG/1
1 TABLET ORAL EVERY 6 HOURS PRN
Qty: 8 TABLET | Refills: 0 | Status: SHIPPED | OUTPATIENT
Start: 2023-12-15 | End: 2023-12-20 | Stop reason: WASHOUT

## 2023-12-15 RX ORDER — IBUPROFEN 600 MG/1
600 TABLET ORAL EVERY 6 HOURS PRN
Qty: 24 TABLET | Refills: 0 | Status: SHIPPED | OUTPATIENT
Start: 2023-12-15 | End: 2023-12-20 | Stop reason: WASHOUT

## 2023-12-15 RX ORDER — MORPHINE SULFATE 4 MG/ML
4 INJECTION, SOLUTION INTRAMUSCULAR; INTRAVENOUS ONCE
Status: COMPLETED | OUTPATIENT
Start: 2023-12-15 | End: 2023-12-15

## 2023-12-15 RX ORDER — ONDANSETRON HYDROCHLORIDE 2 MG/ML
4 INJECTION, SOLUTION INTRAVENOUS ONCE
Status: COMPLETED | OUTPATIENT
Start: 2023-12-15 | End: 2023-12-15

## 2023-12-15 RX ORDER — MEDROXYPROGESTERONE ACETATE 10 MG/1
10 TABLET ORAL 2 TIMES DAILY
Qty: 60 TABLET | Refills: 0 | Status: SHIPPED | OUTPATIENT
Start: 2023-12-15 | End: 2023-12-20 | Stop reason: WASHOUT

## 2023-12-15 RX ADMIN — ONDANSETRON 4 MG: 2 INJECTION INTRAMUSCULAR; INTRAVENOUS at 01:13

## 2023-12-15 RX ADMIN — MORPHINE SULFATE 4 MG: 4 INJECTION, SOLUTION INTRAMUSCULAR; INTRAVENOUS at 01:13

## 2023-12-15 ASSESSMENT — ENCOUNTER SYMPTOMS
HEADACHES: 0
NECK PAIN: 0
SORE THROAT: 1
STRIDOR: 0
ABDOMINAL PAIN: 0
COUGH: 1
SHORTNESS OF BREATH: 0
VOMITING: 0
TROUBLE SWALLOWING: 1
DIARRHEA: 0
SWOLLEN GLANDS: 1
HOARSE VOICE: 1

## 2023-12-15 ASSESSMENT — PAIN SCALES - GENERAL
PAINLEVEL_OUTOF10: 0 - NO PAIN
PAINLEVEL_OUTOF10: 0 - NO PAIN
PAINLEVEL_OUTOF10: 10 - WORST POSSIBLE PAIN

## 2023-12-15 ASSESSMENT — PAIN - FUNCTIONAL ASSESSMENT
PAIN_FUNCTIONAL_ASSESSMENT: 0-10
PAIN_FUNCTIONAL_ASSESSMENT: 0-10

## 2023-12-15 ASSESSMENT — PAIN DESCRIPTION - LOCATION: LOCATION: ABDOMEN

## 2023-12-15 NOTE — TELEPHONE ENCOUNTER
Reviewed CT result with Dr. Gallegos and with patient; IUD in the LLQ of the peritoneum.  Patient acknowledges that risk of perforation was reviewed at time of IUD insertion procedure.  Discussed plan for laparoscopic removal.  Patient is scheduled with Dr. Merino on 12/20/2023 for surgical consultation.  Discussed risk of pregnancy with unprotected intercourse; Rx for POPs sent to patient pharmacy at patient request.    Rx. For POPs provided:   Discussed oral contraceptive use including the lack of protection from STDs, and the risks/benefits and alternatives to this therapy.  Patient informed that it is essential that the pill be taken at the same time each day to maximize contraceptive efficacy. Patient aware that if she misses a dose by more than three hours, she must use additional contraception (condoms) for 48 hours after the late dose. Patient aware that POPs are taken continuously with no hormone-free days.  May start taking pill today, and should use abstinence/condoms/withdrawal for the first 3-5 days of use.  Patient aware of all instructions and consents to starting this method. Patient encouraged to read information packet and to be compliant with daily use.     All questions answered at this time.   Lexi Hsieh, KUN-DIXON

## 2023-12-15 NOTE — ED PROVIDER NOTES
"HPI   Chief Complaint   Patient presents with    Pelvic Pain     Pt states she had a mirena ring placed and had some bleeding and pelvic pain. Pt was seen by her obgyn and ultrasound and ct scan were neg. Pt also c/o sore throat and tested neg for strep and covid. Pt just had a baby this past oct and is breast feeding.     Flu Symptoms       28-year-old female presents emergency department, patient presents with multiple complaints, states that she had an IUD placed Wednesday 12/6, states afterwards she had pain and bleeding, was told this can be normal for up to 72 hours by her OB/GYN.  Patient states bleeding seemed to subside and then returned worse this Wednesday.  States she has had constant pelvic pain since then.  Patient notes her OB/GYN did order her an ultrasound that came back \"inconclusive.  Recommended she get a CT scan performed and that was ordered but the patient states the pain and bleeding is a significant, did not want to wait the whole weekend before having the CT scan performed.    She describes generalized pelvic pain and vaginal bleeding that she describes as heavy for her but is not going through multiple pads an hour.    States she generally feels drained and unwell.    Patient also complains of sore throat and nasal congestion.  States she been having upper respiratory symptoms for several days as well.  Was evaluated at urgent care, negative for strep and the provider ultimately felt that because she was breast-feeding she should continue with ibuprofen and monitoring, states she continues to feel unwell.  No cough or shortness of breath.  Denies any respiratory history.      History provided by:  Patient   used: No                        Murali Coma Scale Score: 15                  Patient History   Past Medical History:   Diagnosis Date    Anxiety     Depression      Past Surgical History:   Procedure Laterality Date    OTHER SURGICAL HISTORY  05/12/2020    " Tonsillectomy    TONSILECTOMY, ADENOIDECTOMY, BILATERAL MYRINGOTOMY AND TUBES       Family History   Problem Relation Name Age of Onset    Other (cardiac disorder) Mother      Hypertension Mother      Other (mental disorder) Mother      Thyroid disease Mother      Diabetes Father      Hypertension Father      Asthma Sister      Immunodeficiency Sister      Other (cardiac disease) Paternal Grandmother       Social History     Tobacco Use    Smoking status: Never    Smokeless tobacco: Never   Vaping Use    Vaping Use: Unknown   Substance Use Topics    Alcohol use: Not Currently    Drug use: Never       Physical Exam   ED Triage Vitals [12/14/23 2312]   Temp Heart Rate Resp BP   36.5 °C (97.7 °F) 92 18 132/84      SpO2 Temp src Heart Rate Source Patient Position   97 % -- -- --      BP Location FiO2 (%)     -- --       Physical Exam  Physical Exam:  Constitutional: Vitals noted, no distress. Afebrile.   EENT: TMs clear. Posterior oropharynx unremarkable.   Cardiovascular: Regular, rate, rhythm, no murmur.   Pulmonary: Lungs clear bilaterally with good aeration. No adventitious breath sounds.   Gastrointestinal: Soft, nonsurgical. Discomfort lower ABD. No peritoneal signs. Normoactive bowel sounds.   Musculoskeletal: No peripheral edema. Negative Homans bilaterally, no cords.     ED Course & MDM   Diagnoses as of 12/15/23 0227   Complication of intrauterine device (IUD), unspecified complication, initial encounter (CMS/[facility])     Labs Reviewed   CBC WITH AUTO DIFFERENTIAL - Abnormal       Result Value    WBC 10.2      nRBC 0.0      RBC 3.69 (*)     Hemoglobin 10.9 (*)     Hematocrit 33.3 (*)     MCV 90      MCH 29.5      MCHC 32.7      RDW 13.2      Platelets 276      Neutrophils % 65.0      Immature Granulocytes %, Automated 0.2      Lymphocytes % 24.2      Monocytes % 8.4      Eosinophils % 1.9      Basophils % 0.3      Neutrophils Absolute 6.62      Immature Granulocytes Absolute, Automated 0.02      Lymphocytes  Absolute 2.46      Monocytes Absolute 0.85      Eosinophils Absolute 0.19      Basophils Absolute 0.03     BASIC METABOLIC PANEL - Abnormal    Glucose 91      Sodium 142      Potassium 3.2 (*)     Chloride 107      Bicarbonate 27      Anion Gap 11      Urea Nitrogen 13      Creatinine 0.51      eGFR >90      Calcium 8.5 (*)    SARS-COV-2 AND INFLUENZA A/B PCR - Normal    Flu A Result Not Detected      Flu B Result Not Detected      Coronavirus 2019, PCR Not Detected      Narrative:     This assay has received FDA Emergency Use Authorization (EUA) and  is only authorized for the duration of time that circumstances exist to justify the authorization of the emergency use of in vitro diagnostic tests for the detection of SARS-CoV-2 virus and/or diagnosis of COVID-19 infection under section 564(b)(1) of the Act, 21 U.S.C. 360bbb-3(b)(1). Testing for SARS-CoV-2 is only recommended for patients who meet current clinical and/or epidemiological criteria as defined by federal, state, or local public health directives. This assay is an in vitro diagnostic nucleic acid amplification test for the qualitative detection of SARS-CoV-2, Influenza A, and Influenza B from nasopharyngeal specimens and has been validated for use at [facility]. Negative results do not preclude COVID-19 infections or Influenza A/B infections, and should not be used as the sole basis for diagnosis, treatment, or other management decisions. If Influenza A/B and RSV PCR results are negative, testing for Parainfluenza virus, Adenovirus and Metapneumovirus is routinely performed for [facility] [place] [place] [facility] inpatients, and is available on other patients by placing an add-on request.    MONONUCLEOSIS SCREEN (HETEROPHILE ANTIBODY) - Normal    Mononucleosis Screen Negative          CT abdomen pelvis wo IV contrast   Final Result   1.  Malpositioned IUD seen within the left lower quadrant within the   peritoneum. This is  not within the uterus.             MACRO:   None        Signed by: Renzo Umanzor 12/15/2023 1:15 AM   Dictation workstation:   [id number]           Medical Decision Making  Patient was medicated for pain.  CBC and metabolic panels did show mild anemia, hemoglobin 10.9, patient also had mild hypokalemia.    Negative for COVID-19, influenza and mono.    CT imaging of the abdomen/pelvis showed malpositioned IUD within the left lower quadrant within the peritoneum, not in the uterus.    I did reach out and speak with both general surgery as well as GYN.  GYN felt the patient could follow-up outpatient, did not need surgical intervention urgently or emergently.    The findings were relayed to the patient, discussed operative management, although again not urgently or emergently.  Recommended she reach out to GYN in the morning to discuss plan of care.  Provided referral to our GYN at [place].  Should continue to monitor symptoms, return with any worsening symptoms or any additional concerns.    Procedure  Procedures     Sabine Guzman, KUN-CNP  12/15/23 4147

## 2023-12-15 NOTE — PROGRESS NOTES
Subjective   Rosalva Pineda is a 28 y.o. female who presents for URI.  Sore Throat   This is a new problem. The current episode started in the past 7 days. The problem has been gradually worsening. The pain is worse on the right side. There has been no fever. The pain is at a severity of 7/10. Associated symptoms include congestion, coughing, ear discharge, ear pain, a hoarse voice, swollen glands and trouble swallowing. Pertinent negatives include no abdominal pain, diarrhea, drooling, headaches, plugged ear sensation, neck pain, shortness of breath, stridor or vomiting. She has had no exposure to strep or mono.   She was at [facility] on Tuesday and the ER last night, negative strep, covid, mono, and flu tests. She is currently breastfeeding.   There were no vitals taken for this visit.   Objective   Physical Exam  Constitutional:       Appearance: Normal appearance.   HENT:      Head: Normocephalic.      Nose: Congestion and rhinorrhea present.   Pulmonary:      Effort: Pulmonary effort is normal.   Psychiatric:         Mood and Affect: Mood normal.         Assessment/Plan    Problem List Items Addressed This Visit    None  Visit Diagnoses       Pharyngitis, unspecified etiology    -  Primary    Relevant Medications    magic mouthwash (lidocaine, diphenhydrAMINE, Maalox 1:1:1)        This 28-year-old female has significant severe sore throat along with cough congestion.  She was seen in the urgent care and the emergency room most recently last night where workup was negative including COVID-19, influenza, mononucleosis, strep pharyngitis swabs.  CBC and metabolic panel are also stable.  At this point this is almost certainly a regular viral illness and would be expected to pass with time.  She has tried over-the-counter symptom control medicines without improvement and has a fairly severe sore throat currently.  I am recommending we try lidocaine Magic mouthwash to gargle for temporary relief without affecting  her breast-feeding.  I have also recommended she use Vicks VapoRub and hot steam treatments for the nasal congestion.    Additionally she has the unfortunate side effect of an IUD that has perforated and is now located in her lower peritoneal cavity.  Fortunately, there is no sign of infection or blood loss and she is scheduled to have laparoscopic removal of this IUD in the next few days.

## 2023-12-15 NOTE — TELEPHONE ENCOUNTER
----- Message from Sai Booker DO sent at 12/15/2023  3:17 PM EST -----  Regarding: RE: Medication  Deborah sent it and I signed it on my phone.  Call it in provera 10 mg BID number [id number].  ----- Message -----  From: Aidan Erazo MA  Sent: 12/15/2023   3:13 PM EST  To: Sai Booker DO  Subject: Medication                                       Patient mentioned that you were suppose to send some medication for her for bleeding - apparently she also spoke with Deborah you scheduled her with Dr. Merino but in mean time her boyfriend is at the pharmacy and nothing was sent.    Script called in today as requested by Dr. Booker.  Reviewed and approved by AIDAN ERAZO on 12/15/23 at 3:35 PM.

## 2023-12-15 NOTE — TELEPHONE ENCOUNTER
I sent a prescription for lidocaine Magic mouthwash to her pharmacy.  This requires the pharmacist to mix it up.  Can you call today and check to make sure that her pharmacy is able to do this and if not we can transfer the prescription elsewhere

## 2023-12-20 ENCOUNTER — OFFICE VISIT (OUTPATIENT)
Dept: OBSTETRICS AND GYNECOLOGY | Facility: CLINIC | Age: 28
End: 2023-12-20
Payer: COMMERCIAL

## 2023-12-20 ENCOUNTER — PREP FOR PROCEDURE (OUTPATIENT)
Dept: OBSTETRICS AND GYNECOLOGY | Facility: CLINIC | Age: 28
End: 2023-12-20

## 2023-12-20 VITALS — BODY MASS INDEX: 30.95 KG/M2 | WEIGHT: 186 LBS | SYSTOLIC BLOOD PRESSURE: 120 MMHG | DIASTOLIC BLOOD PRESSURE: 74 MMHG

## 2023-12-20 DIAGNOSIS — T83.39XA RETAINED INTRAUTERINE CONTRACEPTIVE DEVICE (IUD): Primary | ICD-10-CM

## 2023-12-20 DIAGNOSIS — K59.02 SPASTIC PELVIC FLOOR SYNDROME: Primary | ICD-10-CM

## 2023-12-20 DIAGNOSIS — Z30.09 BIRTH CONTROL COUNSELING: ICD-10-CM

## 2023-12-20 DIAGNOSIS — G89.18 POSTOPERATIVE PAIN: ICD-10-CM

## 2023-12-20 DIAGNOSIS — N93.9 ABNORMAL UTERINE BLEEDING (AUB): ICD-10-CM

## 2023-12-20 PROCEDURE — 99215 OFFICE O/P EST HI 40 MIN: CPT | Performed by: OBSTETRICS & GYNECOLOGY

## 2023-12-20 PROCEDURE — 1036F TOBACCO NON-USER: CPT | Performed by: OBSTETRICS & GYNECOLOGY

## 2023-12-20 RX ORDER — ACETAMINOPHEN 500 MG
1000 TABLET ORAL EVERY 6 HOURS
Qty: 24 TABLET | Refills: 0 | Status: SHIPPED | OUTPATIENT
Start: 2023-12-20 | End: 2023-12-23

## 2023-12-20 RX ORDER — TRANEXAMIC ACID 650 MG/1
1300 TABLET ORAL ONCE
Status: CANCELLED | OUTPATIENT
Start: 2023-12-20 | End: 2023-12-20

## 2023-12-20 RX ORDER — CELECOXIB 50 MG/1
400 CAPSULE ORAL ONCE
Status: CANCELLED | OUTPATIENT
Start: 2023-12-20 | End: 2023-12-20

## 2023-12-20 RX ORDER — NAPROXEN 500 MG/1
500 TABLET ORAL 2 TIMES DAILY
Qty: 15 TABLET | Refills: 2 | Status: SHIPPED | OUTPATIENT
Start: 2023-12-20 | End: 2023-12-23

## 2023-12-20 RX ORDER — ACETAMINOPHEN 325 MG/1
975 TABLET ORAL ONCE
Status: CANCELLED | OUTPATIENT
Start: 2023-12-20 | End: 2023-12-20

## 2023-12-20 RX ORDER — TIZANIDINE 2 MG/1
2 TABLET ORAL NIGHTLY
Qty: 30 TABLET | Refills: 2 | Status: SHIPPED | OUTPATIENT
Start: 2023-12-20 | End: 2024-01-29 | Stop reason: WASHOUT

## 2023-12-20 RX ORDER — GABAPENTIN 300 MG/1
600 CAPSULE ORAL ONCE
Status: CANCELLED | OUTPATIENT
Start: 2023-12-20 | End: 2023-12-20

## 2023-12-20 RX ORDER — NORETHINDRONE 0.35 MG/1
1 TABLET ORAL DAILY
Qty: 84 TABLET | Refills: 3 | Status: SHIPPED | OUTPATIENT
Start: 2023-12-20 | End: 2023-12-20 | Stop reason: WASHOUT

## 2023-12-20 RX ORDER — OXYCODONE HYDROCHLORIDE 5 MG/1
5 TABLET ORAL EVERY 6 HOURS PRN
Qty: 12 TABLET | Refills: 0 | Status: SHIPPED | OUTPATIENT
Start: 2023-12-20 | End: 2023-12-23

## 2023-12-20 RX ORDER — SODIUM CHLORIDE, SODIUM LACTATE, POTASSIUM CHLORIDE, CALCIUM CHLORIDE 600; 310; 30; 20 MG/100ML; MG/100ML; MG/100ML; MG/100ML
100 INJECTION, SOLUTION INTRAVENOUS CONTINUOUS
Status: CANCELLED | OUTPATIENT
Start: 2023-12-20

## 2023-12-20 ASSESSMENT — PAIN SCALES - GENERAL: PAINLEVEL: 0-NO PAIN

## 2023-12-21 NOTE — PROGRESS NOTES
GYN PROGRESS NOTE          CC:   No chief complaint on file.      HPI:  Patient answers are not available for this visit.  HPI    Rosalva is an established patient of the practice, but new to Dr. Merino.  She comes in today with concerns regarding her IUD.   She was seen recently in ED, which diagnosed misplaced IUD.  She c/o pain and heavy bleeding with clots.   This has been ongoing since placement.  She is currently breastfeeding exclusively, and is concerned regarding supply pertaining to medications.   She goes back to work 1/7/23 and is concerned about her time.  Chaperone: CARMENCITA Caldera    Last edited by Savanna Arredondo RN on 12/20/2023  2:06 PM.        Reviewed imaging   Discussed treatment option which includes IUD removal and hysteroscopic caesarean niche repair    ROS:  GEN - no fevers or chills  RESP - no SOB or cough  GYN - see HPI      HISTORY:  Past Medical History:   Diagnosis Date    Anxiety     Depression      Past Surgical History:   Procedure Laterality Date    OTHER SURGICAL HISTORY  05/12/2020    Tonsillectomy    TONSILECTOMY, ADENOIDECTOMY, BILATERAL MYRINGOTOMY AND TUBES       Social History     Socioeconomic History    Marital status: Significant Other     Spouse name: Jem Rodriguez    Number of children: Not on file    Years of education: Not on file    Highest education level: Not on file   Occupational History    Occupation: Doctor's    Tobacco Use    Smoking status: Never    Smokeless tobacco: Never   Vaping Use    Vaping Use: Unknown   Substance and Sexual Activity    Alcohol use: Not Currently    Drug use: Never    Sexual activity: Yes     Partners: Male   Other Topics Concern    Not on file   Social History Narrative    Not on file     Social Determinants of Health     Financial Resource Strain: Low Risk  (10/18/2023)    Overall Financial Resource Strain (CARDIA)     Difficulty of Paying Living Expenses: Not hard at all   Food Insecurity: No Food Insecurity (10/18/2023)     Hunger Vital Sign     Worried About Running Out of Food in the Last Year: Never true     Ran Out of Food in the Last Year: Never true   Transportation Needs: No Transportation Needs (10/18/2023)    PRAPARE - Transportation     Lack of Transportation (Medical): No     Lack of Transportation (Non-Medical): No   Physical Activity: Sufficiently Active (10/18/2023)    Exercise Vital Sign     Days of Exercise per Week: 5 days     Minutes of Exercise per Session: 30 min   Stress: No Stress Concern Present (10/18/2023)    [employer] [place] [employer] - Occupational Stress Questionnaire     Feeling of Stress : Not at all   Social Connections: Moderately Isolated (10/18/2023)    Social Connection and Isolation Panel [NHANES]     Frequency of Communication with Friends and Family: More than three times a week     Frequency of Social Gatherings with Friends and Family: More than three times a week     Attends Holiness Services: Never     Active Member of Clubs or Organizations: No     Attends Club or Organization Meetings: Never     Marital Status: Living with partner   Intimate Partner Violence: Not At Risk (10/18/2023)    Humiliation, Afraid, Rape, and Kick questionnaire     Fear of Current or Ex-Partner: No     Emotionally Abused: No     Physically Abused: No     Sexually Abused: No     Cancer-related family history is not on file.       PHYSICAL EXAM:  /74 (BP Location: Left arm, Patient Position: Sitting, BP Cuff Size: Adult)   Wt 84.4 kg (186 lb)   Breastfeeding No   BMI 30.95 kg/m²   GEN:  A&O, NAD  HEENT:  head HC/AT, no visible goiter  PSYCH:  normal affect, non-anxious      IMPRESSION/PLAN:  28-year-old myofascial pelvic pain, medical trauma, normal uterine bleeding suspect  section niche, intraperitoneal IUD    - tiZANidine (Zanaflex) 2 mg tablet; Take 1 tablet (2 mg) by mouth once daily at bedtime.  Dispense: 30 tablet; Refill: 2  - acetaminophen (Tylenol) 500 mg tablet; Take 2 tablets  (1,000 mg) by mouth every 6 hours for 3 days.  Dispense: 24 tablet; Refill: 0  - oxyCODONE (Roxicodone) 5 mg immediate release tablet; Take 1 tablet (5 mg) by mouth every 6 hours if needed for severe pain (7 - 10) for up to 3 days.  Dispense: 12 tablet; Refill: 0  - naproxen (Naprosyn) 500 mg tablet; Take 1 tablet (500 mg) by mouth 2 times a day for 3 days.  Dispense: 15 tablet; Refill: 2  - Hysteroscopic caesarean niche repair and laparoscopic IUD removal    Follow up as scheduled    .Paolo TYLER am scribing for virtually, and in the presence of Dr. Imer Merino on  12/20/23 at 9:21 PM     Agree with above, JAYSON Melgoza personally performed the services described in the documentation which was scribed virtually confirm is both complete and accurate. AGGIE Merino MD

## 2023-12-28 ENCOUNTER — APPOINTMENT (OUTPATIENT)
Dept: OBSTETRICS AND GYNECOLOGY | Facility: CLINIC | Age: 28
End: 2023-12-28
Payer: COMMERCIAL

## 2024-01-03 ENCOUNTER — APPOINTMENT (OUTPATIENT)
Dept: OBSTETRICS AND GYNECOLOGY | Facility: CLINIC | Age: 29
End: 2024-01-03
Payer: COMMERCIAL

## 2024-01-09 ENCOUNTER — LAB (OUTPATIENT)
Dept: LAB | Facility: HOSPITAL | Age: 29
End: 2024-01-09
Payer: COMMERCIAL

## 2024-01-09 ENCOUNTER — TELEPHONE (OUTPATIENT)
Dept: OBSTETRICS AND GYNECOLOGY | Facility: CLINIC | Age: 29
End: 2024-01-09
Payer: COMMERCIAL

## 2024-01-09 DIAGNOSIS — T83.39XA RETAINED INTRAUTERINE CONTRACEPTIVE DEVICE (IUD): ICD-10-CM

## 2024-01-09 DIAGNOSIS — Z30.09 BIRTH CONTROL COUNSELING: Primary | ICD-10-CM

## 2024-01-09 LAB
ABO GROUP (TYPE) IN BLOOD: NORMAL
ALBUMIN SERPL BCP-MCNC: 4.4 G/DL (ref 3.4–5)
ALP SERPL-CCNC: 120 U/L (ref 33–110)
ALT SERPL W P-5'-P-CCNC: 27 U/L (ref 7–45)
ANION GAP SERPL CALC-SCNC: 14 MMOL/L (ref 10–20)
ANTIBODY SCREEN: NORMAL
AST SERPL W P-5'-P-CCNC: 20 U/L (ref 9–39)
BILIRUB SERPL-MCNC: 0.3 MG/DL (ref 0–1.2)
BUN SERPL-MCNC: 14 MG/DL (ref 6–23)
CALCIUM SERPL-MCNC: 8.9 MG/DL (ref 8.6–10.3)
CHLORIDE SERPL-SCNC: 101 MMOL/L (ref 98–107)
CO2 SERPL-SCNC: 28 MMOL/L (ref 21–32)
CREAT SERPL-MCNC: 0.54 MG/DL (ref 0.5–1.05)
EGFRCR SERPLBLD CKD-EPI 2021: >90 ML/MIN/1.73M*2
ERYTHROCYTE [DISTWIDTH] IN BLOOD BY AUTOMATED COUNT: 13.6 % (ref 11.5–14.5)
GLUCOSE SERPL-MCNC: 90 MG/DL (ref 74–99)
HCT VFR BLD AUTO: 36.5 % (ref 36–46)
HGB BLD-MCNC: 12.1 G/DL (ref 12–16)
MCH RBC QN AUTO: 29.5 PG (ref 26–34)
MCHC RBC AUTO-ENTMCNC: 33.2 G/DL (ref 32–36)
MCV RBC AUTO: 89 FL (ref 80–100)
NRBC BLD-RTO: 0 /100 WBCS (ref 0–0)
PLATELET # BLD AUTO: 342 X10*3/UL (ref 150–450)
POTASSIUM SERPL-SCNC: 3.8 MMOL/L (ref 3.5–5.3)
PROT SERPL-MCNC: 7.3 G/DL (ref 6.4–8.2)
RBC # BLD AUTO: 4.1 X10*6/UL (ref 4–5.2)
RH FACTOR (ANTIGEN D): NORMAL
SODIUM SERPL-SCNC: 139 MMOL/L (ref 136–145)
WBC # BLD AUTO: 9.1 X10*3/UL (ref 4.4–11.3)

## 2024-01-09 PROCEDURE — 36415 COLL VENOUS BLD VENIPUNCTURE: CPT

## 2024-01-09 PROCEDURE — 80053 COMPREHEN METABOLIC PANEL: CPT

## 2024-01-09 PROCEDURE — 86901 BLOOD TYPING SEROLOGIC RH(D): CPT

## 2024-01-09 PROCEDURE — 85027 COMPLETE CBC AUTOMATED: CPT

## 2024-01-09 NOTE — PREPROCEDURE INSTRUCTIONS
Appropriate clothing, bring glasses case, center location, insurance information, remove jewerly, bring responsible adult as the . NPO after midnight as directed. Patient is breastfeeding and will discuss anesthesia with anesthesia provider as Dr. Merino directed.

## 2024-01-09 NOTE — TELEPHONE ENCOUNTER
Pt called and said that a BC mini pill was discussed and was to be called in for her but [facility] says nothing has been called in. Please call pt or the [facility] on [facility] in [place] to clarify. Pt says she has spoken to the MA and they have confirmed it was called in but [facility] says they don't have anything. Thanks

## 2024-01-10 RX ORDER — NORETHINDRONE 0.35 MG/1
1 TABLET ORAL DAILY
Qty: 28 TABLET | Refills: 11 | Status: SHIPPED | OUTPATIENT
Start: 2024-01-10 | End: 2024-01-29 | Stop reason: WASHOUT

## 2024-01-12 ENCOUNTER — HOSPITAL ENCOUNTER (OUTPATIENT)
Facility: HOSPITAL | Age: 29
Setting detail: OUTPATIENT SURGERY
Discharge: HOME | End: 2024-01-12
Attending: OBSTETRICS & GYNECOLOGY | Admitting: OBSTETRICS & GYNECOLOGY
Payer: COMMERCIAL

## 2024-01-12 ENCOUNTER — ANESTHESIA EVENT (OUTPATIENT)
Dept: OPERATING ROOM | Facility: HOSPITAL | Age: 29
End: 2024-01-12
Payer: COMMERCIAL

## 2024-01-12 ENCOUNTER — ANESTHESIA (OUTPATIENT)
Dept: OPERATING ROOM | Facility: HOSPITAL | Age: 29
End: 2024-01-12
Payer: COMMERCIAL

## 2024-01-12 VITALS
SYSTOLIC BLOOD PRESSURE: 129 MMHG | DIASTOLIC BLOOD PRESSURE: 83 MMHG | HEART RATE: 86 BPM | OXYGEN SATURATION: 97 % | HEIGHT: 65 IN | TEMPERATURE: 97.3 F | WEIGHT: 184.97 LBS | BODY MASS INDEX: 30.82 KG/M2 | RESPIRATION RATE: 16 BRPM

## 2024-01-12 DIAGNOSIS — N93.9 ABNORMAL UTERINE BLEEDING (AUB): ICD-10-CM

## 2024-01-12 DIAGNOSIS — G89.18 POSTOPERATIVE PAIN: Primary | ICD-10-CM

## 2024-01-12 DIAGNOSIS — Z41.9 SURGERY, ELECTIVE: ICD-10-CM

## 2024-01-12 DIAGNOSIS — T83.39XA RETAINED INTRAUTERINE CONTRACEPTIVE DEVICE (IUD): ICD-10-CM

## 2024-01-12 LAB
ABO GROUP (TYPE) IN BLOOD: NORMAL
PREGNANCY TEST URINE, POC: NEGATIVE
RH FACTOR (ANTIGEN D): NORMAL

## 2024-01-12 PROCEDURE — 81025 URINE PREGNANCY TEST: CPT | Performed by: OBSTETRICS & GYNECOLOGY

## 2024-01-12 PROCEDURE — 7100000002 HC RECOVERY ROOM TIME - EACH INCREMENTAL 1 MINUTE: Performed by: OBSTETRICS & GYNECOLOGY

## 2024-01-12 PROCEDURE — 2500000001 HC RX 250 WO HCPCS SELF ADMINISTERED DRUGS (ALT 637 FOR MEDICARE OP): Performed by: STUDENT IN AN ORGANIZED HEALTH CARE EDUCATION/TRAINING PROGRAM

## 2024-01-12 PROCEDURE — 2500000004 HC RX 250 GENERAL PHARMACY W/ HCPCS (ALT 636 FOR OP/ED): Performed by: STUDENT IN AN ORGANIZED HEALTH CARE EDUCATION/TRAINING PROGRAM

## 2024-01-12 PROCEDURE — 2500000001 HC RX 250 WO HCPCS SELF ADMINISTERED DRUGS (ALT 637 FOR MEDICARE OP): Performed by: OBSTETRICS & GYNECOLOGY

## 2024-01-12 PROCEDURE — 7100000010 HC PHASE TWO TIME - EACH INCREMENTAL 1 MINUTE: Performed by: OBSTETRICS & GYNECOLOGY

## 2024-01-12 PROCEDURE — 2500000005 HC RX 250 GENERAL PHARMACY W/O HCPCS: Performed by: STUDENT IN AN ORGANIZED HEALTH CARE EDUCATION/TRAINING PROGRAM

## 2024-01-12 PROCEDURE — 58558 HYSTEROSCOPY BIOPSY: CPT | Performed by: OBSTETRICS & GYNECOLOGY

## 2024-01-12 PROCEDURE — 2500000004 HC RX 250 GENERAL PHARMACY W/ HCPCS (ALT 636 FOR OP/ED): Performed by: OBSTETRICS & GYNECOLOGY

## 2024-01-12 PROCEDURE — 49329 UNLSTD LAPS PX ABD PERTM&OMN: CPT | Performed by: OBSTETRICS & GYNECOLOGY

## 2024-01-12 PROCEDURE — 3700000001 HC GENERAL ANESTHESIA TIME - INITIAL BASE CHARGE: Performed by: OBSTETRICS & GYNECOLOGY

## 2024-01-12 PROCEDURE — 7100000009 HC PHASE TWO TIME - INITIAL BASE CHARGE: Performed by: OBSTETRICS & GYNECOLOGY

## 2024-01-12 PROCEDURE — 3600000008 HC OR TIME - EACH INCREMENTAL 1 MINUTE - PROCEDURE LEVEL THREE: Performed by: OBSTETRICS & GYNECOLOGY

## 2024-01-12 PROCEDURE — 2500000005 HC RX 250 GENERAL PHARMACY W/O HCPCS: Performed by: OBSTETRICS & GYNECOLOGY

## 2024-01-12 PROCEDURE — 88300 SURGICAL PATH GROSS: CPT | Performed by: PATHOLOGY

## 2024-01-12 PROCEDURE — 88300 SURGICAL PATH GROSS: CPT | Mod: TC,SUR,ELYLAB | Performed by: OBSTETRICS & GYNECOLOGY

## 2024-01-12 PROCEDURE — 2720000007 HC OR 272 NO HCPCS: Performed by: OBSTETRICS & GYNECOLOGY

## 2024-01-12 PROCEDURE — 7100000001 HC RECOVERY ROOM TIME - INITIAL BASE CHARGE: Performed by: OBSTETRICS & GYNECOLOGY

## 2024-01-12 PROCEDURE — 36415 COLL VENOUS BLD VENIPUNCTURE: CPT | Performed by: OBSTETRICS & GYNECOLOGY

## 2024-01-12 PROCEDURE — 3600000003 HC OR TIME - INITIAL BASE CHARGE - PROCEDURE LEVEL THREE: Performed by: OBSTETRICS & GYNECOLOGY

## 2024-01-12 PROCEDURE — 3700000002 HC GENERAL ANESTHESIA TIME - EACH INCREMENTAL 1 MINUTE: Performed by: OBSTETRICS & GYNECOLOGY

## 2024-01-12 RX ORDER — OXYCODONE HYDROCHLORIDE 5 MG/1
5 TABLET ORAL EVERY 4 HOURS PRN
Status: DISCONTINUED | OUTPATIENT
Start: 2024-01-12 | End: 2024-01-12 | Stop reason: HOSPADM

## 2024-01-12 RX ORDER — FENTANYL CITRATE 50 UG/ML
50 INJECTION, SOLUTION INTRAMUSCULAR; INTRAVENOUS EVERY 5 MIN PRN
Status: DISCONTINUED | OUTPATIENT
Start: 2024-01-12 | End: 2024-01-12 | Stop reason: HOSPADM

## 2024-01-12 RX ORDER — GABAPENTIN 300 MG/1
600 CAPSULE ORAL ONCE
Status: COMPLETED | OUTPATIENT
Start: 2024-01-12 | End: 2024-01-12

## 2024-01-12 RX ORDER — BUPIVACAINE HCL/EPINEPHRINE 0.25-.0005
VIAL (ML) INJECTION AS NEEDED
Status: DISCONTINUED | OUTPATIENT
Start: 2024-01-12 | End: 2024-01-12 | Stop reason: HOSPADM

## 2024-01-12 RX ORDER — ONDANSETRON HYDROCHLORIDE 2 MG/ML
INJECTION, SOLUTION INTRAVENOUS AS NEEDED
Status: DISCONTINUED | OUTPATIENT
Start: 2024-01-12 | End: 2024-01-12

## 2024-01-12 RX ORDER — LIDOCAINE HYDROCHLORIDE 20 MG/ML
INJECTION, SOLUTION INFILTRATION; PERINEURAL AS NEEDED
Status: DISCONTINUED | OUTPATIENT
Start: 2024-01-12 | End: 2024-01-12

## 2024-01-12 RX ORDER — SODIUM CHLORIDE, SODIUM LACTATE, POTASSIUM CHLORIDE, CALCIUM CHLORIDE 600; 310; 30; 20 MG/100ML; MG/100ML; MG/100ML; MG/100ML
100 INJECTION, SOLUTION INTRAVENOUS CONTINUOUS
Status: DISCONTINUED | OUTPATIENT
Start: 2024-01-12 | End: 2024-01-12 | Stop reason: HOSPADM

## 2024-01-12 RX ORDER — MEPERIDINE HYDROCHLORIDE 25 MG/ML
12.5 INJECTION INTRAMUSCULAR; INTRAVENOUS; SUBCUTANEOUS EVERY 10 MIN PRN
Status: DISCONTINUED | OUTPATIENT
Start: 2024-01-12 | End: 2024-01-12 | Stop reason: HOSPADM

## 2024-01-12 RX ORDER — ACETAMINOPHEN 325 MG/1
975 TABLET ORAL ONCE
Status: COMPLETED | OUTPATIENT
Start: 2024-01-12 | End: 2024-01-12

## 2024-01-12 RX ORDER — FENTANYL CITRATE 50 UG/ML
INJECTION, SOLUTION INTRAMUSCULAR; INTRAVENOUS AS NEEDED
Status: DISCONTINUED | OUTPATIENT
Start: 2024-01-12 | End: 2024-01-12

## 2024-01-12 RX ORDER — DROPERIDOL 2.5 MG/ML
0.62 INJECTION, SOLUTION INTRAMUSCULAR; INTRAVENOUS ONCE AS NEEDED
Status: DISCONTINUED | OUTPATIENT
Start: 2024-01-12 | End: 2024-01-12 | Stop reason: HOSPADM

## 2024-01-12 RX ORDER — PROPOFOL 10 MG/ML
INJECTION, EMULSION INTRAVENOUS AS NEEDED
Status: DISCONTINUED | OUTPATIENT
Start: 2024-01-12 | End: 2024-01-12

## 2024-01-12 RX ORDER — LIDOCAINE HYDROCHLORIDE 10 MG/ML
0.1 INJECTION, SOLUTION EPIDURAL; INFILTRATION; INTRACAUDAL; PERINEURAL ONCE
Status: DISCONTINUED | OUTPATIENT
Start: 2024-01-12 | End: 2024-01-12 | Stop reason: HOSPADM

## 2024-01-12 RX ORDER — TRAMADOL HYDROCHLORIDE 50 MG/1
50 TABLET ORAL EVERY 6 HOURS PRN
Qty: 12 TABLET | Refills: 0 | Status: SHIPPED | OUTPATIENT
Start: 2024-01-12 | End: 2024-01-15

## 2024-01-12 RX ORDER — LABETALOL HYDROCHLORIDE 5 MG/ML
5 INJECTION, SOLUTION INTRAVENOUS ONCE AS NEEDED
Status: DISCONTINUED | OUTPATIENT
Start: 2024-01-12 | End: 2024-01-12 | Stop reason: HOSPADM

## 2024-01-12 RX ORDER — GABAPENTIN 600 MG/1
600 TABLET ORAL 3 TIMES DAILY
Qty: 9 TABLET | Refills: 0 | Status: SHIPPED | OUTPATIENT
Start: 2024-01-12 | End: 2024-01-29 | Stop reason: WASHOUT

## 2024-01-12 RX ORDER — ROCURONIUM BROMIDE 10 MG/ML
INJECTION, SOLUTION INTRAVENOUS AS NEEDED
Status: DISCONTINUED | OUTPATIENT
Start: 2024-01-12 | End: 2024-01-12

## 2024-01-12 RX ORDER — MIDAZOLAM HYDROCHLORIDE 1 MG/ML
INJECTION, SOLUTION INTRAMUSCULAR; INTRAVENOUS AS NEEDED
Status: DISCONTINUED | OUTPATIENT
Start: 2024-01-12 | End: 2024-01-12

## 2024-01-12 RX ORDER — ACETAMINOPHEN 500 MG
1000 TABLET ORAL EVERY 6 HOURS
Qty: 24 TABLET | Refills: 0 | Status: SHIPPED | OUTPATIENT
Start: 2024-01-12 | End: 2024-01-15

## 2024-01-12 RX ORDER — DOCUSATE SODIUM 100 MG/1
100 CAPSULE, LIQUID FILLED ORAL 2 TIMES DAILY
Qty: 60 CAPSULE | Refills: 0 | Status: SHIPPED | OUTPATIENT
Start: 2024-01-12 | End: 2024-01-29 | Stop reason: WASHOUT

## 2024-01-12 RX ORDER — CELECOXIB 200 MG/1
400 CAPSULE ORAL ONCE
Status: COMPLETED | OUTPATIENT
Start: 2024-01-12 | End: 2024-01-12

## 2024-01-12 RX ORDER — PHENYLEPHRINE HCL IN 0.9% NACL 1 MG/10 ML
SYRINGE (ML) INTRAVENOUS AS NEEDED
Status: DISCONTINUED | OUTPATIENT
Start: 2024-01-12 | End: 2024-01-12

## 2024-01-12 RX ORDER — NAPROXEN 500 MG/1
500 TABLET ORAL 2 TIMES DAILY
Qty: 6 TABLET | Refills: 0 | Status: SHIPPED | OUTPATIENT
Start: 2024-01-12 | End: 2024-01-15

## 2024-01-12 RX ORDER — DIPHENHYDRAMINE HYDROCHLORIDE 50 MG/ML
12.5 INJECTION INTRAMUSCULAR; INTRAVENOUS ONCE AS NEEDED
Status: DISCONTINUED | OUTPATIENT
Start: 2024-01-12 | End: 2024-01-12 | Stop reason: HOSPADM

## 2024-01-12 RX ORDER — TRANEXAMIC ACID 650 MG/1
1300 TABLET ORAL ONCE
Status: COMPLETED | OUTPATIENT
Start: 2024-01-12 | End: 2024-01-12

## 2024-01-12 RX ADMIN — MIDAZOLAM 2 MG: 1 INJECTION INTRAMUSCULAR; INTRAVENOUS at 07:40

## 2024-01-12 RX ADMIN — FENTANYL CITRATE 50 MCG: 50 INJECTION, SOLUTION INTRAMUSCULAR; INTRAVENOUS at 09:34

## 2024-01-12 RX ADMIN — GABAPENTIN 600 MG: 300 CAPSULE ORAL at 06:04

## 2024-01-12 RX ADMIN — PROPOFOL 200 MG: 10 INJECTION, EMULSION INTRAVENOUS at 07:40

## 2024-01-12 RX ADMIN — ACETAMINOPHEN 975 MG: 325 TABLET ORAL at 06:04

## 2024-01-12 RX ADMIN — SODIUM CHLORIDE, POTASSIUM CHLORIDE, SODIUM LACTATE AND CALCIUM CHLORIDE 100 ML/HR: 600; 310; 30; 20 INJECTION, SOLUTION INTRAVENOUS at 06:04

## 2024-01-12 RX ADMIN — FENTANYL CITRATE 50 MCG: 50 INJECTION, SOLUTION INTRAMUSCULAR; INTRAVENOUS at 08:46

## 2024-01-12 RX ADMIN — ONDANSETRON 4 MG: 2 INJECTION, SOLUTION INTRAMUSCULAR; INTRAVENOUS at 08:46

## 2024-01-12 RX ADMIN — FENTANYL CITRATE 100 MCG: 50 INJECTION, SOLUTION INTRAMUSCULAR; INTRAVENOUS at 07:40

## 2024-01-12 RX ADMIN — ROCURONIUM BROMIDE 50 MG: 10 SOLUTION INTRAVENOUS at 07:56

## 2024-01-12 RX ADMIN — OXYCODONE HYDROCHLORIDE 5 MG: 5 TABLET ORAL at 10:29

## 2024-01-12 RX ADMIN — Medication 150 MCG: at 08:11

## 2024-01-12 RX ADMIN — FENTANYL CITRATE 50 MCG: 50 INJECTION, SOLUTION INTRAMUSCULAR; INTRAVENOUS at 09:05

## 2024-01-12 RX ADMIN — SODIUM CHLORIDE, SODIUM LACTATE, POTASSIUM CHLORIDE, AND CALCIUM CHLORIDE: 600; 310; 30; 20 INJECTION, SOLUTION INTRAVENOUS at 07:30

## 2024-01-12 RX ADMIN — SUGAMMADEX 200 MG: 100 INJECTION, SOLUTION INTRAVENOUS at 08:57

## 2024-01-12 RX ADMIN — CELECOXIB 400 MG: 200 CAPSULE ORAL at 06:04

## 2024-01-12 RX ADMIN — TRANEXAMIC ACID 1300 MG: 650 TABLET, FILM COATED ORAL at 06:04

## 2024-01-12 RX ADMIN — LIDOCAINE HYDROCHLORIDE 80 MG: 20 INJECTION, SOLUTION INFILTRATION; PERINEURAL at 07:40

## 2024-01-12 ASSESSMENT — COLUMBIA-SUICIDE SEVERITY RATING SCALE - C-SSRS
1. IN THE PAST MONTH, HAVE YOU WISHED YOU WERE DEAD OR WISHED YOU COULD GO TO SLEEP AND NOT WAKE UP?: NO
2. HAVE YOU ACTUALLY HAD ANY THOUGHTS OF KILLING YOURSELF?: NO
6. HAVE YOU EVER DONE ANYTHING, STARTED TO DO ANYTHING, OR PREPARED TO DO ANYTHING TO END YOUR LIFE?: NO

## 2024-01-12 ASSESSMENT — PAIN SCALES - GENERAL
PAINLEVEL_OUTOF10: 7
PAINLEVEL_OUTOF10: 5 - MODERATE PAIN
PAIN_LEVEL: 0
PAINLEVEL_OUTOF10: 5 - MODERATE PAIN
PAINLEVEL_OUTOF10: 7
PAINLEVEL_OUTOF10: 5 - MODERATE PAIN
PAINLEVEL_OUTOF10: 5 - MODERATE PAIN
PAINLEVEL_OUTOF10: 7

## 2024-01-12 ASSESSMENT — PAIN - FUNCTIONAL ASSESSMENT
PAIN_FUNCTIONAL_ASSESSMENT: 0-10

## 2024-01-12 ASSESSMENT — PAIN DESCRIPTION - DESCRIPTORS: DESCRIPTORS: SORE

## 2024-01-12 ASSESSMENT — PAIN DESCRIPTION - ORIENTATION: ORIENTATION: LOWER

## 2024-01-12 ASSESSMENT — PAIN DESCRIPTION - LOCATION: LOCATION: ABDOMEN

## 2024-01-12 NOTE — H&P
"History Of Present Illness  Rosalva Pineda is a 28 y.o. female presenting with Retained intrauterine contraceptive device (IUD) [T83.39XA]  Abnormal uterine bleeding (AUB) [N93.9]Pre-op Diagnosis     * Retained intrauterine contraceptive device (IUD) [T83.39XA]     * Abnormal uterine bleeding (AUB) [N93.9]     Past Medical History  Past Medical History:   Diagnosis Date    Anxiety     Depression        Surgical History  Past Surgical History:   Procedure Laterality Date     SECTION, LOW TRANSVERSE N/A 10/19/2023    OTHER SURGICAL HISTORY  2020    Tonsillectomy    TONSILECTOMY, ADENOIDECTOMY, BILATERAL MYRINGOTOMY AND TUBES          Social History  She reports that she has never smoked. She has never used smokeless tobacco. She reports that she does not currently use alcohol. She reports that she does not use drugs.    Family History  Family History   Problem Relation Name Age of Onset    Other (cardiac disorder) Mother      Hypertension Mother      Other (mental disorder) Mother      Thyroid disease Mother      Diabetes Father      Hypertension Father      Asthma Sister      Immunodeficiency Sister      Other (cardiac disease) Paternal Grandmother          Allergies  Cefaclor    Review of systems   12 point review of systems was performed and noncontributory    Physical exam  General: Appears stated age, no acute distress   Head: NCAT  Skin: Not diaphoretic, no flushing,   Eye: PERRL, EOMI   Respiratory: No respiratory distress or shortness of breath   Musculoskeletal:  BLE and BUE movement intact   Neuro: normal speech, no gait abnormalities noted  Psych: normal affect     Last Recorded Vitals  Blood pressure 118/75, pulse 85, temperature 36.5 °C (97.7 °F), temperature source Temporal, resp. rate 16, height 1.651 m (5' 5\"), weight 83.9 kg (184 lb 15.5 oz), last menstrual period 2024, SpO2 97 %, not currently breastfeeding.    Relevant Results           Assessment/Plan       MO UNLISTED " LAPAROSCOPY PX ABD PERTONEUM & OMENTUM [83499] (Laparoscopic removal of IUD)  SD HYSTEROSCOPY BX ENDOMETRIUM&/POLYPC W/WO D&C [93300] (OperativeHysteroscopy with Revision of Niche; STORTZ BIPOLAR SCOPE)         Imer Merino MD

## 2024-01-12 NOTE — OP NOTE
Date: 2024  OR Location: [facility]    Name: Rosalva Pineda : 1995, Age: 28 y.o., MRN: [id number], Sex: female    Diagnosis  * No surgery found * Intraperitoneal IUD     section defect     Procedures  Laparoscopy partial omentectomy removal of IUD    Operative hysteroscopy revision of niche      Surgeons      * Imer Merino - Primary    Resident/Fellow/Other Assistant:  Surgeon(s) and Role:    Procedure Summary  Anesthesia: General  ASA: II  Anesthesia Staff: Anesthesiologist: Vazquez Miller DO    Estimated Blood Loss: < 50 mL     Findings: Extremely thin tissue anterior uterus, several areas of hypervascularity polypoid tissue noted, IUD attached to omentum    Specimens: IUD and omentum    Procedure:          PROCEDURE:  Patient was taken the operating room after informed consent was obtained to place in supine position. General anesthesia was started without complication. She was placed in lithotomy position in yellowfin stirrups and exam under anesthesia was performed.  She is prepped and draped in normal sterile fashion. A timeout was performed. A Thomas was introduced and her urinary bladder with production of clear urine. A speculum was placed into the vagina and the anterior lip of the cervix was grasped with a single-tooth tenaculum.  Uterine manipulator was introduced into the uterine cavity.  Attention was paid to the abdomen where a 1.5cm incision was made in the infraumbilical  fashion after injection of Marcaine. An open fashion the fascia was opened the peritoneal layer was entered in a blunt manner palpation identified no adhesions.  A single-site gel port was introduced into the abdominal cavity.  The abdomen was insufflated to 15 mm of carbon dioxide. The abdomen was surveyed. Bilateral tubes and ovaries anterior cul-de-sac and posterior cul-de-sac were sequentially visualized.  Thin anterior lower uterine segment was noted with bladder scarring minimal to  moderate.    Omentum was evaluated IUD noted IUD tensioned and excised using bipolar sealing retaining some omentum on the IUD this was removed from the abdominal cavity handed off the field  The diaphragm was irrigated with a bicarb and lidocaine solution. The fascia was closed at the umbilicus. Fat was irrigated the skin was closed using a subcuticular stitch covered with skin glue. Thomas was removed.       Hysteroscopy was then performed speculum was placed into the vagina.  Anterior lip of the cervix was grasped with single-tooth tenaculum. A paracervical block was preformed. Uterus was sounded and dilated. Hysteroscope was introduced in the uterine cavity. Cavity was assessed photos were taken. The resectoscope device was placed in to the uterine cavity and under direct visualization the tissue was excised off the anterior lower uterine segment .  Cycle defect in the muscle wall was noted to 1 cm edges were cauterized granulation and hypervascular tissue was cauterized. the cavity was found to have limited bleeding.  The instrumentation was then removed  Sponge lap needle counts were reported as correct. Patient tolerated the procedure well and was transferred to PACU in stable condition.  Imer Merino MD

## 2024-01-12 NOTE — DISCHARGE INSTRUCTIONS
2 RN skin assessment done; skin not WDL. See Wound flowsheet.Right hip dressing gauze and tegaderm cdi.   General Anesthesia Discharge Instructions    About this topic  You may need general anesthesia if you need to be asleep during a procedure. Your doctor will use drugs to block the signals that go from your nerves to your brain. Doctors give general anesthesia during a surgery or procedure to:  Allow you to sleep  Help your body be still  Relax your muscles  Help you to relax and be pain free  Keep you from remembering the surgery  Let the doctor manage your airway, breathing, and blood flow  The doctor or nurse anesthetist gives general anesthesia by a shot into your vein. Sometimes, you may breathe in a gas through a mask placed over your face.  What care is needed at home?  Ask your doctor what you need to do when you go home. Make sure you ask questions if you do not understand what the doctor says.  Your doctor may give you drugs to prevent or treat an upset stomach from the anesthetic. Take them as ordered.  If your throat is sore, suck on ice chips or popsicles to ease throat pain.  Put 2 to 3 pillows under your head and back when you lie down to help you breathe easier.  For the first 24 to 48 hours:  Do not operate heavy or dangerous machinery.  Do not make major decisions or sign important papers. You may not be able to think clearly.  Avoid beer, wine, or mixed drinks.  You are at a higher risk of falling for at least 24 hours after general anesthesia.  Take extra care when you get up.  Do not change positions quickly.  Do not rush when you need to go to the bathroom or to answer the phone.  Ask for help if you feel unsteady when you try to walk.  Wear shoes with non-slip soles and low heels.  What follow-up care is needed?  Your doctor may ask you to come back to the office to check on your progress. Be sure to keep these visits.  If you have stitches that do not dissolve or staples, you will need to have them removed. Your doctor will want to do this in 1 to 2 weeks. If the doctor used skin glue, the  glue will fall off on its own.  What drugs may be needed?  The doctor may order drugs to:  Help with pain  Treat an upset stomach or throwing up  Will physical activity be limited?  You will not be allowed to drive right away after the procedure. Ask a family member or a friend to drive you home.  Avoid trying to get out of bed without help until you are sure of your balance.  You may have to limit your activity. Talk to your doctor about if you need to limit how much you lift or limit exercise after your procedure.  What changes to diet are needed?  Start with a light diet when you are fully awake. This includes things that are easy to swallow like soups, pudding, jello, toast, and eggs. Slowly progress to your normal diet.  What problems could happen?  Low blood pressure  Breathing problems  Upset stomach or throwing up  Dizziness  Blood clots  Infection  When do I need to call the doctor?  Trouble breathing  Upset stomach or throwing up more than 3 times in the next 2 days  Dizziness  Teach Back: Helping You Understand  The Teach Back Method helps you understand the information we are giving you. After you talk with the staff, tell them in your own words what you learned. This helps to make sure the staff has described each thing clearly. It also helps to explain things that may have been confusing. Before going home, make sure you can do these:  I can tell you about my procedure.  I can tell you if I need to follow up with my doctor.  I can tell you what is good for me to eat and drink the next day.  I can tell you what I would do if I have trouble breathing, an upset stomach, or dizziness.  Where can I learn more?  [employer] [place] [employer]  https://www.nigms.nih.gov/education/pages/factsheet_Anesthesia.aspx  NHS Choices  http://www.nhs.uk/conditions/Anaesthetic-general/Pages/Definition.aspx  Last Reviewed Date  2020-04-22

## 2024-01-12 NOTE — ANESTHESIA PREPROCEDURE EVALUATION
Patient: Rosalva Pineda    Procedure Information       Date/Time: 01/12/24 0730    Procedures:       Laparoscopic removal of IUD      OperativeHysteroscopy with Revision of Niche STORTZ BIPOLAR SCOPE    Location: [facility] 08 / [facility]    Surgeons: Imer Merino MD            Relevant Problems   GI   (+) GERD (gastroesophageal reflux disease)      Neuro/Psych   (+) Anxiety and depression       Clinical information reviewed:   Tobacco  Allergies  Meds   Med Hx  Surg Hx  OB Status  Fam Hx  Soc   Hx        NPO Detail:  NPO/Void Status  Carbohydrate Drink Given Prior to Surgery? : N  Date of Last Liquid: 01/11/24  Time of Last Liquid: 2355  Date of Last Solid: 01/11/24  Time of Last Solid: 2100  Last Intake Type: Clear fluids  Time of Last Void: 0616         Physical Exam    Airway  Mallampati: II     Cardiovascular   Rhythm: regular  Rate: normal     Dental    Pulmonary - normal exam     Abdominal - normal exam             Anesthesia Plan    History of general anesthesia?: yes  History of complications of general anesthesia?: no    ASA 2     general     intravenous induction   Postoperative administration of opioids is intended.  Anesthetic plan and risks discussed with patient.

## 2024-01-12 NOTE — ANESTHESIA PROCEDURE NOTES
Airway  Date/Time: 1/12/2024 7:58 AM  Urgency: elective    Airway not difficult    Staffing  Performed: attending   Authorized by: Vazquez Miller DO    Performed by: Vazquez Miller DO  Patient location during procedure: OR    Indications and Patient Condition  Indications for airway management: anesthesia  Sedation level: deep  Preoxygenated: yes      Final Airway Details  Final airway type: endotracheal airway      Successful airway: ETT     Facilitating devices/methods: intubating stylet  Blade: Jackson  Blade size: #3  ETT size (mm): 7.0  Measured from: lips  ETT to lips (cm): 22  Number of attempts at approach: 1

## 2024-01-12 NOTE — ANESTHESIA PROCEDURE NOTES
Airway  Date/Time: 1/12/2024 7:39 AM  Urgency: elective    Airway not difficult    Staffing  Performed: attending   Authorized by: Vazquez Miller DO    Performed by: Vazquez Miller DO  Patient location during procedure: OR    Indications and Patient Condition  Indications for airway management: anesthesia  Spontaneous Ventilation: absent  Sedation level: deep  Preoxygenated: yes  Mask difficulty assessment: 0 - not attempted    Final Airway Details  Final airway type: supraglottic airway      Successful airway: classic  Size 4     Number of attempts at approach: 1

## 2024-01-12 NOTE — ANESTHESIA POSTPROCEDURE EVALUATION
Patient: Rosalva Pineda    Procedure Summary       Date: 01/12/24 Room / Location: [facility] / [facility]    Anesthesia Start: 0730 Anesthesia Stop: 0909    Procedures:       Laparoscopic removal of IUD      OperativeHysteroscopy with Revision of Niche STORTZ BIPOLAR SCOPE Diagnosis:       Retained intrauterine contraceptive device (IUD)      Abnormal uterine bleeding (AUB)      (Retained intrauterine contraceptive device (IUD) [T83.39XA])      (Abnormal uterine bleeding (AUB) [N93.9])    Surgeons: Imer Merino MD Responsible Provider: Vazquez Miller DO    Anesthesia Type: general ASA Status: 2            Anesthesia Type: general    Vitals Value Taken Time   /88 01/12/24 0907   Temp 36.0 01/12/24 0909   Pulse 102 01/12/24 0908   Resp 15 01/12/24 0908   SpO2 94 % 01/12/24 0908   Vitals shown include unvalidated device data.    Anesthesia Post Evaluation    Patient location during evaluation: bedside  Patient participation: complete - patient participated  Level of consciousness: awake and alert  Pain score: 0  Pain management: adequate  Airway patency: patent  Cardiovascular status: acceptable  Respiratory status: acceptable  Hydration status: acceptable  Postoperative Nausea and Vomiting: none        There were no known notable events for this encounter.

## 2024-01-16 ENCOUNTER — TELEPHONE (OUTPATIENT)
Dept: OBSTETRICS AND GYNECOLOGY | Facility: CLINIC | Age: 29
End: 2024-01-16
Payer: COMMERCIAL

## 2024-01-16 NOTE — TELEPHONE ENCOUNTER
Patient called answering service on 2024 at 5:16 PM with complaint of post passage of large blood clot.    I returned patient's phone call and she was extremely anxious and worried that she was having some sort of postop complication because her recent delivery she had experience in postop course after her  was very traumatic and complicated.  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  Initially postop she just had light bleeding with small clot passage, then today before calling she all of a sudden passed a larger clot that was more concerning to her.    Instructed patient that her amount of bleeding is normal postop and the blood clot is not anything of concern even at its size.  Bleeding precautions reviewed.  Will have office staff contact patient on Monday to check it postop.  Will also have office RN offer patient referral to obstetrics psychiatry team due to delivery trauma.    Sai Booker, DO

## 2024-01-24 LAB
LABORATORY COMMENT REPORT: NORMAL
PATH REPORT.ADDENDUM SPEC: NORMAL
PATH REPORT.FINAL DX SPEC: NORMAL
PATH REPORT.GROSS SPEC: NORMAL
PATH REPORT.RELEVANT HX SPEC: NORMAL
PATH REPORT.TOTAL CANCER: NORMAL

## 2024-01-25 ENCOUNTER — APPOINTMENT (OUTPATIENT)
Dept: OBSTETRICS AND GYNECOLOGY | Facility: CLINIC | Age: 29
End: 2024-01-25
Payer: COMMERCIAL

## 2024-01-29 ENCOUNTER — TELEMEDICINE (OUTPATIENT)
Dept: OBSTETRICS AND GYNECOLOGY | Facility: CLINIC | Age: 29
End: 2024-01-29
Payer: COMMERCIAL

## 2024-01-29 DIAGNOSIS — Z00.00 HEALTH CARE MAINTENANCE: Primary | ICD-10-CM

## 2024-01-29 PROCEDURE — 99442 PR PHYS/QHP TELEPHONE EVALUATION 11-20 MIN: CPT | Performed by: OBSTETRICS & GYNECOLOGY

## 2024-01-29 RX ORDER — NORETHINDRONE ACETATE AND ETHINYL ESTRADIOL .02; 1 MG/1; MG/1
1 TABLET ORAL DAILY
Qty: 90 TABLET | Refills: 3 | Status: SHIPPED | OUTPATIENT
Start: 2024-01-29 | End: 2024-02-23 | Stop reason: ALTCHOICE

## 2024-01-29 NOTE — PROGRESS NOTES
GYN PROGRESS NOTE    Virtual or Telephone Consent    A telephone visit (audio only) between the patient (at the originating site) and the provider (at the distant site) was utilized to provide this telehealth service.   Verbal consent was requested and obtained from Roslava Pineda on this date, 24 for a telehealth visit.   11 minutes      Chief complaint: post op    HPI:  Patient answers are not available for this visit.  - She is doing well since her procedure.  Today was her first day back to work.    Reviewed contraception options which include oral compination pill, vaginal ring, weekly patch, or Nexplanon    HISTORY:  Past Medical History:   Diagnosis Date    Anxiety     Depression      Past Surgical History:   Procedure Laterality Date     SECTION, LOW TRANSVERSE N/A 10/19/2023    OTHER SURGICAL HISTORY  2020    Tonsillectomy    TONSILECTOMY, ADENOIDECTOMY, BILATERAL MYRINGOTOMY AND TUBES       Social History     Socioeconomic History    Marital status: Significant Other     Spouse name: Jem Rodriguez    Number of children: Not on file    Years of education: Not on file    Highest education level: Not on file   Occupational History    Occupation: Doctor's    Tobacco Use    Smoking status: Never    Smokeless tobacco: Never   Vaping Use    Vaping Use: Unknown   Substance and Sexual Activity    Alcohol use: Not Currently    Drug use: Never    Sexual activity: Yes     Partners: Male   Other Topics Concern    Not on file   Social History Narrative    Not on file     Social Determinants of Health     Financial Resource Strain: Low Risk  (10/18/2023)    Overall Financial Resource Strain (CARDIA)     Difficulty of Paying Living Expenses: Not hard at all   Food Insecurity: No Food Insecurity (10/18/2023)    Hunger Vital Sign     Worried About Running Out of Food in the Last Year: Never true     Ran Out of Food in the Last Year: Never true   Transportation Needs: No Transportation Needs  (10/18/2023)    PRAPARE - Transportation     Lack of Transportation (Medical): No     Lack of Transportation (Non-Medical): No   Physical Activity: Sufficiently Active (10/18/2023)    Exercise Vital Sign     Days of Exercise per Week: 5 days     Minutes of Exercise per Session: 30 min   Stress: No Stress Concern Present (10/18/2023)    [employer] [place] [employer] - Occupational Stress Questionnaire     Feeling of Stress : Not at all   Social Connections: Moderately Isolated (10/18/2023)    Social Connection and Isolation Panel [NHANES]     Frequency of Communication with Friends and Family: More than three times a week     Frequency of Social Gatherings with Friends and Family: More than three times a week     Attends Catholic Services: Never     Active Member of Clubs or Organizations: No     Attends Club or Organization Meetings: Never     Marital Status: Living with partner   Intimate Partner Violence: Not At Risk (10/18/2023)    Humiliation, Afraid, Rape, and Kick questionnaire     Fear of Current or Ex-Partner: No     Emotionally Abused: No     Physically Abused: No     Sexually Abused: No     Cancer-related family history is not on file.     IMPRESSION/PLAN:  28-year-old postop with persistent anterior  section defect s/p laparoscopy partial omentectomy removal of IUD.    - norethindrone ac-eth estradioL (Microgestin ) 1-20 mg-mcg tablet; Take 1 tablet by mouth once daily.  Dispense: 90 tablet; Refill: 3  - Discuss laparoscopic repair of anterior uterine defect 6 months before next pregnancy    Follow up in 1 year    Paolo TYLER am scribing for virtually, and in the presence of Dr. Imer Merino on  24 at 3:20 PM     Agree with above, JAYSON Melgoza personally performed the services described in the documentation which was scribed virtually confirm is both complete and accurate. DS  Imer Merino MD

## 2024-02-01 ENCOUNTER — APPOINTMENT (OUTPATIENT)
Dept: OBSTETRICS AND GYNECOLOGY | Facility: CLINIC | Age: 29
End: 2024-02-01
Payer: COMMERCIAL

## 2024-02-08 ENCOUNTER — CLINICAL SUPPORT (OUTPATIENT)
Dept: OBSTETRICS AND GYNECOLOGY | Facility: CLINIC | Age: 29
End: 2024-02-08
Payer: COMMERCIAL

## 2024-02-08 VITALS — SYSTOLIC BLOOD PRESSURE: 112 MMHG | WEIGHT: 184 LBS | DIASTOLIC BLOOD PRESSURE: 72 MMHG | BODY MASS INDEX: 30.62 KG/M2

## 2024-02-08 DIAGNOSIS — Z30.013 ENCOUNTER FOR INITIAL PRESCRIPTION OF INJECTABLE CONTRACEPTIVE: ICD-10-CM

## 2024-02-08 LAB — PREGNANCY TEST URINE, POC: NEGATIVE

## 2024-02-08 PROCEDURE — 96372 THER/PROPH/DIAG INJ SC/IM: CPT | Performed by: OBSTETRICS & GYNECOLOGY

## 2024-02-08 PROCEDURE — 81025 URINE PREGNANCY TEST: CPT | Performed by: OBSTETRICS & GYNECOLOGY

## 2024-02-08 RX ORDER — MEDROXYPROGESTERONE ACETATE 150 MG/ML
150 INJECTION, SUSPENSION INTRAMUSCULAR ONCE
Status: COMPLETED | OUTPATIENT
Start: 2024-02-08 | End: 2024-02-08

## 2024-02-08 RX ADMIN — MEDROXYPROGESTERONE ACETATE 150 MG: 150 INJECTION, SUSPENSION INTRAMUSCULAR at 08:55

## 2024-02-08 ASSESSMENT — PATIENT HEALTH QUESTIONNAIRE - PHQ9
1. LITTLE INTEREST OR PLEASURE IN DOING THINGS: NOT AT ALL
2. FEELING DOWN, DEPRESSED OR HOPELESS: NOT AT ALL
SUM OF ALL RESPONSES TO PHQ9 QUESTIONS 1 & 2: 0

## 2024-02-19 ENCOUNTER — APPOINTMENT (OUTPATIENT)
Dept: PRIMARY CARE | Facility: CLINIC | Age: 29
End: 2024-02-19
Payer: COMMERCIAL

## 2024-02-23 ENCOUNTER — OFFICE VISIT (OUTPATIENT)
Dept: PRIMARY CARE | Facility: CLINIC | Age: 29
End: 2024-02-23
Payer: COMMERCIAL

## 2024-02-23 VITALS
RESPIRATION RATE: 14 BRPM | WEIGHT: 182 LBS | SYSTOLIC BLOOD PRESSURE: 108 MMHG | TEMPERATURE: 98 F | BODY MASS INDEX: 30.32 KG/M2 | DIASTOLIC BLOOD PRESSURE: 70 MMHG | HEIGHT: 65 IN | HEART RATE: 88 BPM

## 2024-02-23 DIAGNOSIS — Z30.9 ENCOUNTER FOR CONTRACEPTIVE MANAGEMENT, UNSPECIFIED TYPE: Primary | ICD-10-CM

## 2024-02-23 DIAGNOSIS — Z00.00 ROUTINE GENERAL MEDICAL EXAMINATION AT A HEALTH CARE FACILITY: ICD-10-CM

## 2024-02-23 PROBLEM — K21.9 GERD (GASTROESOPHAGEAL REFLUX DISEASE): Status: RESOLVED | Noted: 2023-08-24 | Resolved: 2024-02-23

## 2024-02-23 PROBLEM — N93.9 ABNORMAL UTERINE BLEEDING (AUB): Status: RESOLVED | Noted: 2023-12-20 | Resolved: 2024-02-23

## 2024-02-23 PROBLEM — O21.9 NAUSEA AND VOMITING IN PREGNANCY (HHS-HCC): Status: RESOLVED | Noted: 2023-08-24 | Resolved: 2024-02-23

## 2024-02-23 PROBLEM — M94.0 COSTOCHONDRITIS, ACUTE: Status: RESOLVED | Noted: 2023-08-24 | Resolved: 2024-02-23

## 2024-02-23 PROBLEM — T83.39XA RETAINED INTRAUTERINE CONTRACEPTIVE DEVICE (IUD): Status: RESOLVED | Noted: 2023-12-20 | Resolved: 2024-02-23

## 2024-02-23 PROCEDURE — 99395 PREV VISIT EST AGE 18-39: CPT | Performed by: FAMILY MEDICINE

## 2024-02-23 PROCEDURE — 1036F TOBACCO NON-USER: CPT | Performed by: FAMILY MEDICINE

## 2024-02-23 RX ORDER — MEDROXYPROGESTERONE ACETATE 150 MG/ML
150 INJECTION, SUSPENSION INTRAMUSCULAR
COMMUNITY
End: 2024-02-23 | Stop reason: SDUPTHER

## 2024-02-23 RX ORDER — MEDROXYPROGESTERONE ACETATE 150 MG/ML
150 INJECTION, SUSPENSION INTRAMUSCULAR
Qty: 1 ML | Refills: 3 | Status: SHIPPED | OUTPATIENT
Start: 2024-04-22 | End: 2024-04-22 | Stop reason: SDUPTHER

## 2024-02-23 NOTE — PROGRESS NOTES
Subjective   Rosalva Pineda is a 28 y.o. female who presents for a wellness visit.  HPI   Review of Systems  Visit Vitals  /70   Pulse 88   Temp 36.7 °C (98 °F)   Resp 14      Objective   Physical Exam  Constitutional:       Appearance: Normal appearance.   HENT:      Head: Normocephalic.   Eyes:      Conjunctiva/sclera: Conjunctivae normal.   Cardiovascular:      Rate and Rhythm: Normal rate and regular rhythm.   Pulmonary:      Effort: Pulmonary effort is normal.      Breath sounds: Normal breath sounds.   Musculoskeletal:      Cervical back: Neck supple.   Skin:     General: Skin is warm and dry.   Neurological:      Mental Status: She is alert.       Assessment/Plan    Problem List Items Addressed This Visit    None  Visit Diagnoses       Encounter for contraceptive management, unspecified type    -  Primary    Relevant Medications    medroxyPROGESTERone (Depo-Provera) 150 mg/mL injection (Start on 4/22/2024)    Routine general medical examination at a health care facility        Relevant Orders    Follow Up In [facility] - PCP - Health Maintenance        This 28-year-old female is status post a recent pregnancy with a healthy child.  She is following with her gynecologist as there were some complications with the previous IUD resulting in thinning of the uterus and she is planning a surgical repair of this.  In the meantime she will proceed with contraception using Depo Provera and would like to switch her prescribing to this office.  We will go ahead and schedule that as her next injection is due in April.    We reviewed lab work from her pregnancy and her lipid panel, metabolic panel, CBC are in the normal range.  She is up-to-date on vaccines but I do recommend staying up-to-date with annual flu and COVID-19 boosters.    She is concerned about the of the hair but her scalp is healthy and there is no alopecia.  I believe this is simply brittle hair from the pregnancy.  I recommend using  Biotene to strengthen hair and nails and expect this to resolve in the coming year

## 2024-04-22 DIAGNOSIS — Z30.9 ENCOUNTER FOR CONTRACEPTIVE MANAGEMENT, UNSPECIFIED TYPE: ICD-10-CM

## 2024-04-22 RX ORDER — MEDROXYPROGESTERONE ACETATE 150 MG/ML
150 INJECTION, SUSPENSION INTRAMUSCULAR
Qty: 1 ML | Refills: 3 | Status: SHIPPED | OUTPATIENT
Start: 2024-04-22

## 2024-04-26 ENCOUNTER — OFFICE VISIT (OUTPATIENT)
Dept: PRIMARY CARE | Facility: CLINIC | Age: 29
End: 2024-04-26
Payer: COMMERCIAL

## 2024-04-26 DIAGNOSIS — Z30.9 ENCOUNTER FOR CONTRACEPTIVE MANAGEMENT, UNSPECIFIED TYPE: ICD-10-CM

## 2024-04-26 PROCEDURE — 1036F TOBACCO NON-USER: CPT | Performed by: FAMILY MEDICINE

## 2024-04-26 PROCEDURE — 96372 THER/PROPH/DIAG INJ SC/IM: CPT | Performed by: FAMILY MEDICINE

## 2024-04-26 RX ORDER — MEDROXYPROGESTERONE ACETATE 150 MG/ML
150 INJECTION, SUSPENSION INTRAMUSCULAR ONCE
Status: CANCELLED | OUTPATIENT
Start: 2024-04-26 | End: 2024-04-26

## 2024-04-26 RX ORDER — MEDROXYPROGESTERONE ACETATE 150 MG/ML
150 INJECTION, SUSPENSION INTRAMUSCULAR ONCE
Status: COMPLETED | OUTPATIENT
Start: 2024-04-26 | End: 2024-04-26

## 2024-04-26 RX ADMIN — MEDROXYPROGESTERONE ACETATE 150 MG: 150 INJECTION, SUSPENSION INTRAMUSCULAR at 11:59

## 2024-04-29 ENCOUNTER — APPOINTMENT (OUTPATIENT)
Dept: OBSTETRICS AND GYNECOLOGY | Facility: CLINIC | Age: 29
End: 2024-04-29
Payer: COMMERCIAL

## 2024-07-10 DIAGNOSIS — Z30.9 ENCOUNTER FOR CONTRACEPTIVE MANAGEMENT, UNSPECIFIED TYPE: ICD-10-CM

## 2024-07-10 RX ORDER — MEDROXYPROGESTERONE ACETATE 150 MG/ML
150 INJECTION, SUSPENSION INTRAMUSCULAR
Qty: 1 ML | Refills: 3 | Status: SHIPPED | OUTPATIENT
Start: 2024-07-10

## 2024-07-10 NOTE — TELEPHONE ENCOUNTER
Rx Refill Request Telephone Encounter    Name:  Rosalva Pineda  :  [id number]  Medication Name:    medroxyPROGESTERone (Depo-Provera) 150 mg/mL injection             Specific Pharmacy location:  [facility]  Date of last appointment:  na  Date of next appointment:  na  Best number to reach patient:  na

## 2024-07-12 ENCOUNTER — APPOINTMENT (OUTPATIENT)
Dept: PRIMARY CARE | Facility: CLINIC | Age: 29
End: 2024-07-12
Payer: COMMERCIAL

## 2024-07-12 DIAGNOSIS — Z30.9 ENCOUNTER FOR CONTRACEPTIVE MANAGEMENT, UNSPECIFIED TYPE: ICD-10-CM

## 2024-07-12 PROCEDURE — 96372 THER/PROPH/DIAG INJ SC/IM: CPT | Performed by: FAMILY MEDICINE

## 2024-07-12 RX ORDER — MEDROXYPROGESTERONE ACETATE 150 MG/ML
150 INJECTION, SUSPENSION INTRAMUSCULAR ONCE
Status: COMPLETED | OUTPATIENT
Start: 2024-07-12 | End: 2024-07-12

## 2024-08-09 ENCOUNTER — APPOINTMENT (OUTPATIENT)
Dept: PLASTIC SURGERY | Facility: CLINIC | Age: 29
End: 2024-08-09
Payer: COMMERCIAL

## 2024-08-09 VITALS
BODY MASS INDEX: 30.99 KG/M2 | HEIGHT: 65 IN | WEIGHT: 186 LBS | SYSTOLIC BLOOD PRESSURE: 120 MMHG | DIASTOLIC BLOOD PRESSURE: 80 MMHG

## 2024-08-09 DIAGNOSIS — L81.9 ATYPICAL PIGMENTED SKIN LESION: Primary | ICD-10-CM

## 2024-08-09 PROCEDURE — 3008F BODY MASS INDEX DOCD: CPT | Performed by: PLASTIC SURGERY

## 2024-08-09 PROCEDURE — 99203 OFFICE O/P NEW LOW 30 MIN: CPT | Performed by: PLASTIC SURGERY

## 2024-08-09 PROCEDURE — 1036F TOBACCO NON-USER: CPT | Performed by: PLASTIC SURGERY

## 2024-08-09 ASSESSMENT — ENCOUNTER SYMPTOMS
FEVER: 0
CHILLS: 0

## 2024-08-09 ASSESSMENT — PAIN SCALES - GENERAL: PAINLEVEL: 0-NO PAIN

## 2024-08-09 NOTE — LETTER
August 9, 2024     Josefina Guevara MD  [address] [address]    Patient: Rosalva Pineda   YOB: 1995   Date of Visit: 8/9/2024       Dear Dr. Josefina Guevara MD:    Thank you for referring Rosalva Pineda to me for evaluation. Below are my notes for this consultation.  If you have questions, please do not hesitate to call me. I look forward to following your patient along with you.       Sincerely,     Roland J Reyes, MD      CC: No Recipients  ______________________________________________________________________________________    Subjective  Patient ID: Rosalva Pineda is a 28 y.o. female.    HPI  28-year-old female referred by Dr. Guevara for an atypical pigmented lesion on the left lateral hallux.  Patient states this enlarged during pregnancy  Review of Systems   Constitutional:  Negative for chills and fever.   Skin:         Enlarging pigmented lesion of left great toe       Objective  Physical Exam  Well-developed patient in no acute distress  Examination HEENT within normal limits  Neck supple no observable masses  Lungs clear to auscultation  Heart regular rate and rhythm  Abdomen soft nontender  Extremities full range of motion; left lateral hallux close to the lateral eponychial fold reveals a 3 x 2 mm pigmented lesion with irregular borders  Neurological exam is grossly within normal limits  Assessment/Plan  There are no diagnoses linked to this encounter.    Impression: Atypical pigmented lesion left hallux  Recommendation: Excision under local anesthesia.  We have reviewed the consent form, paragraph by paragraph regarding the possible complications.  Patient appears to understand and wishes to proceed and has provided both written and  verbal consent in agreement.  Patient is scheduled For September 27 at 11:30 AM

## 2024-08-09 NOTE — PROGRESS NOTES
Subjective   Patient ID: Rosalva Pineda is a 28 y.o. female.    HPI  28-year-old female referred by Dr. Guevara for an atypical pigmented lesion on the left lateral hallux.  Patient states this enlarged during pregnancy  Review of Systems   Constitutional:  Negative for chills and fever.   Skin:         Enlarging pigmented lesion of left great toe       Objective   Physical Exam  Well-developed patient in no acute distress  Examination HEENT within normal limits  Neck supple no observable masses  Lungs clear to auscultation  Heart regular rate and rhythm  Abdomen soft nontender  Extremities full range of motion; left lateral hallux close to the lateral eponychial fold reveals a 3 x 2 mm pigmented lesion with irregular borders  Neurological exam is grossly within normal limits  Assessment/Plan   There are no diagnoses linked to this encounter.    Impression: Atypical pigmented lesion left hallux  Recommendation: Excision under local anesthesia.  We have reviewed the consent form, paragraph by paragraph regarding the possible complications.  Patient appears to understand and wishes to proceed and has provided both written and  verbal consent in agreement.  Patient is scheduled For September 27 at 11:30 AM

## 2024-09-23 ENCOUNTER — PREP FOR PROCEDURE (OUTPATIENT)
Dept: PLASTIC SURGERY | Facility: CLINIC | Age: 29
End: 2024-09-23
Payer: COMMERCIAL

## 2024-09-27 ENCOUNTER — APPOINTMENT (OUTPATIENT)
Dept: PLASTIC SURGERY | Facility: CLINIC | Age: 29
End: 2024-09-27
Payer: COMMERCIAL

## 2024-09-27 VITALS
HEIGHT: 65 IN | BODY MASS INDEX: 30.99 KG/M2 | DIASTOLIC BLOOD PRESSURE: 64 MMHG | SYSTOLIC BLOOD PRESSURE: 98 MMHG | WEIGHT: 186 LBS

## 2024-09-27 DIAGNOSIS — L81.9 ATYPICAL PIGMENTED LESION: Primary | ICD-10-CM

## 2024-09-27 PROCEDURE — 3008F BODY MASS INDEX DOCD: CPT | Performed by: PLASTIC SURGERY

## 2024-09-27 PROCEDURE — 11420 EXC H-F-NK-SP B9+MARG 0.5/<: CPT | Performed by: PLASTIC SURGERY

## 2024-09-27 PROCEDURE — 1036F TOBACCO NON-USER: CPT | Performed by: PLASTIC SURGERY

## 2024-09-27 ASSESSMENT — PAIN SCALES - GENERAL: PAINLEVEL: 0-NO PAIN

## 2024-09-27 ASSESSMENT — ENCOUNTER SYMPTOMS
FEVER: 0
CHILLS: 0

## 2024-09-27 NOTE — PROGRESS NOTES
Pathology  Subjective   Patient ID: Rosalva Pineda is a 28 y.o. female.    HPI  28-year-old female with a atypical pigmented lesion of the left hallux on the lateral aspect.  Patient is now to undergo excisional biopsy.  Review of Systems   Constitutional:  Negative for chills and fever.       Objective   Physical Exam  Examination reveals a 2 to 3 mm pigmented lesion with irregular borders.    Procedure note: Using a glove tourniquet and 2% lidocaine plain the area was locally infiltrated.  A 5 mm excisional biopsy was then done excising down to subcutaneous tissue level.  This was done in a lenticular shaped fashion.  The incision was then closed using 4-0 plain gut sutures simple interrupted fashion.  Antibiotic ointment was then applied followed by a Band-Aid dressing tourniquet was then released.  Patient tolerated the procedure well.  Assessment/Plan   There are no diagnoses linked to this encounter.    Excisional biopsy atypical pigmented lesion left hallux  Wound care instructions discussed with the patient  Follow-up 2 weeks

## 2024-10-01 RX ORDER — MEDROXYPROGESTERONE ACETATE 150 MG/ML
150 INJECTION, SUSPENSION INTRAMUSCULAR ONCE
Status: COMPLETED | OUTPATIENT
Start: 2024-10-01 | End: 2024-10-02

## 2024-10-02 ENCOUNTER — APPOINTMENT (OUTPATIENT)
Dept: PRIMARY CARE | Facility: CLINIC | Age: 29
End: 2024-10-02
Payer: COMMERCIAL

## 2024-10-02 DIAGNOSIS — Z30.9 ENCOUNTER FOR CONTRACEPTIVE MANAGEMENT, UNSPECIFIED TYPE: ICD-10-CM

## 2024-10-02 PROCEDURE — 1036F TOBACCO NON-USER: CPT | Performed by: FAMILY MEDICINE

## 2024-10-02 PROCEDURE — 96372 THER/PROPH/DIAG INJ SC/IM: CPT | Performed by: FAMILY MEDICINE

## 2024-10-04 ENCOUNTER — APPOINTMENT (OUTPATIENT)
Dept: SURGERY | Facility: CLINIC | Age: 29
End: 2024-10-04
Payer: COMMERCIAL

## 2024-10-11 ENCOUNTER — APPOINTMENT (OUTPATIENT)
Dept: PLASTIC SURGERY | Facility: CLINIC | Age: 29
End: 2024-10-11
Payer: COMMERCIAL

## 2024-10-11 VITALS — HEIGHT: 65 IN | WEIGHT: 186 LBS | BODY MASS INDEX: 30.99 KG/M2

## 2024-10-11 DIAGNOSIS — L81.9 ATYPICAL PIGMENTED LESION: Primary | ICD-10-CM

## 2024-10-11 PROCEDURE — 3008F BODY MASS INDEX DOCD: CPT | Performed by: PLASTIC SURGERY

## 2024-10-11 PROCEDURE — 99024 POSTOP FOLLOW-UP VISIT: CPT | Performed by: PLASTIC SURGERY

## 2024-10-11 PROCEDURE — 1036F TOBACCO NON-USER: CPT | Performed by: PLASTIC SURGERY

## 2024-10-11 ASSESSMENT — ENCOUNTER SYMPTOMS
FEVER: 0
CHILLS: 0

## 2024-10-11 ASSESSMENT — PAIN SCALES - GENERAL: PAINLEVEL: 0-NO PAIN

## 2024-10-11 NOTE — PROGRESS NOTES
Subjective   Patient ID: Rosalva Pineda is a 28 y.o. female.    HPI  Patient is status post excision atypical pigmented lesion left lateral hallux.  Pathology report pending  Review of Systems   Constitutional:  Negative for chills and fever.       Objective   Physical Exam  Examination reveals the incision is nicely approximated there is no signs of infection.  Assessment/Plan   There are no diagnoses linked to this encounter.    Status post excision atypical pigmented lesion left lateral hallux  Follow-up as needed  We will inform her of her pathology report

## 2024-10-24 ENCOUNTER — ANESTHESIA EVENT (OUTPATIENT)
Dept: OPERATING ROOM | Facility: HOSPITAL | Age: 29
End: 2024-10-24
Payer: COMMERCIAL

## 2024-10-24 ENCOUNTER — HOSPITAL ENCOUNTER (OUTPATIENT)
Dept: RADIOLOGY | Facility: HOSPITAL | Age: 29
Discharge: HOME | End: 2024-10-24
Payer: COMMERCIAL

## 2024-10-24 ENCOUNTER — LAB (OUTPATIENT)
Dept: LAB | Facility: LAB | Age: 29
End: 2024-10-24
Payer: COMMERCIAL

## 2024-10-24 ENCOUNTER — HOSPITAL ENCOUNTER (OUTPATIENT)
Facility: HOSPITAL | Age: 29
Setting detail: OBSERVATION
Discharge: HOME | End: 2024-10-25
Attending: STUDENT IN AN ORGANIZED HEALTH CARE EDUCATION/TRAINING PROGRAM | Admitting: SURGERY
Payer: COMMERCIAL

## 2024-10-24 ENCOUNTER — TELEPHONE (OUTPATIENT)
Dept: PRIMARY CARE | Facility: CLINIC | Age: 29
End: 2024-10-24

## 2024-10-24 ENCOUNTER — ANESTHESIA (OUTPATIENT)
Dept: OPERATING ROOM | Facility: HOSPITAL | Age: 29
End: 2024-10-24
Payer: COMMERCIAL

## 2024-10-24 ENCOUNTER — OFFICE VISIT (OUTPATIENT)
Dept: PRIMARY CARE | Facility: CLINIC | Age: 29
End: 2024-10-24
Payer: COMMERCIAL

## 2024-10-24 VITALS
OXYGEN SATURATION: 98 % | WEIGHT: 189.4 LBS | BODY MASS INDEX: 31.52 KG/M2 | SYSTOLIC BLOOD PRESSURE: 120 MMHG | DIASTOLIC BLOOD PRESSURE: 82 MMHG | TEMPERATURE: 97.9 F | RESPIRATION RATE: 20 BRPM | HEART RATE: 110 BPM

## 2024-10-24 DIAGNOSIS — K35.80 ACUTE APPENDICITIS, UNSPECIFIED ACUTE APPENDICITIS TYPE: Primary | ICD-10-CM

## 2024-10-24 DIAGNOSIS — R10.31 RIGHT LOWER QUADRANT ABDOMINAL PAIN: ICD-10-CM

## 2024-10-24 DIAGNOSIS — R10.9 FLANK PAIN: ICD-10-CM

## 2024-10-24 DIAGNOSIS — R10.31 RIGHT LOWER QUADRANT ABDOMINAL PAIN: Primary | ICD-10-CM

## 2024-10-24 DIAGNOSIS — R11.0 NAUSEA: ICD-10-CM

## 2024-10-24 PROBLEM — E66.811 CLASS 1 OBESITY IN ADULT: Status: ACTIVE | Noted: 2024-10-24

## 2024-10-24 LAB
ANION GAP SERPL CALC-SCNC: 14 MMOL/L (ref 10–20)
B-HCG SERPL-ACNC: <2 MIU/ML
BASOPHILS # BLD AUTO: 0.04 X10*3/UL (ref 0–0.1)
BASOPHILS NFR BLD AUTO: 0.3 %
BUN SERPL-MCNC: 9 MG/DL (ref 6–23)
CALCIUM SERPL-MCNC: 8.9 MG/DL (ref 8.6–10.3)
CHLORIDE SERPL-SCNC: 105 MMOL/L (ref 98–107)
CO2 SERPL-SCNC: 25 MMOL/L (ref 21–32)
CREAT SERPL-MCNC: 0.55 MG/DL (ref 0.5–1.05)
CRP SERPL-MCNC: 9.98 MG/DL
EGFRCR SERPLBLD CKD-EPI 2021: >90 ML/MIN/1.73M*2
EOSINOPHIL # BLD AUTO: 0.14 X10*3/UL (ref 0–0.7)
EOSINOPHIL NFR BLD AUTO: 1 %
ERYTHROCYTE [DISTWIDTH] IN BLOOD BY AUTOMATED COUNT: 13.6 % (ref 11.5–14.5)
GLUCOSE SERPL-MCNC: 76 MG/DL (ref 74–99)
HCT VFR BLD AUTO: 38.6 % (ref 36–46)
HGB BLD-MCNC: 12.6 G/DL (ref 12–16)
IMM GRANULOCYTES # BLD AUTO: 0.05 X10*3/UL (ref 0–0.7)
IMM GRANULOCYTES NFR BLD AUTO: 0.3 % (ref 0–0.9)
LYMPHOCYTES # BLD AUTO: 2.7 X10*3/UL (ref 1.2–4.8)
LYMPHOCYTES NFR BLD AUTO: 18.8 %
MCH RBC QN AUTO: 29.1 PG (ref 26–34)
MCHC RBC AUTO-ENTMCNC: 32.6 G/DL (ref 32–36)
MCV RBC AUTO: 89 FL (ref 80–100)
MONOCYTES # BLD AUTO: 1.11 X10*3/UL (ref 0.1–1)
MONOCYTES NFR BLD AUTO: 7.7 %
NEUTROPHILS # BLD AUTO: 10.31 X10*3/UL (ref 1.2–7.7)
NEUTROPHILS NFR BLD AUTO: 71.9 %
NRBC BLD-RTO: 0 /100 WBCS (ref 0–0)
PLATELET # BLD AUTO: 304 X10*3/UL (ref 150–450)
POC APPEARANCE, URINE: CLEAR
POC BILIRUBIN, URINE: NEGATIVE
POC BLOOD, URINE: NEGATIVE
POC COLOR, URINE: YELLOW
POC GLUCOSE, URINE: NEGATIVE MG/DL
POC KETONES, URINE: NEGATIVE MG/DL
POC LEUKOCYTES, URINE: ABNORMAL
POC NITRITE,URINE: NEGATIVE
POC PH, URINE: 6.5 PH
POC PROTEIN, URINE: ABNORMAL MG/DL
POC SPECIFIC GRAVITY, URINE: 1.01
POC UROBILINOGEN, URINE: 0.2 EU/DL
POTASSIUM SERPL-SCNC: 4 MMOL/L (ref 3.5–5.3)
RBC # BLD AUTO: 4.33 X10*6/UL (ref 4–5.2)
SODIUM SERPL-SCNC: 140 MMOL/L (ref 136–145)
WBC # BLD AUTO: 14.4 X10*3/UL (ref 4.4–11.3)

## 2024-10-24 PROCEDURE — 2500000004 HC RX 250 GENERAL PHARMACY W/ HCPCS (ALT 636 FOR OP/ED)

## 2024-10-24 PROCEDURE — G0378 HOSPITAL OBSERVATION PER HR: HCPCS

## 2024-10-24 PROCEDURE — 2500000005 HC RX 250 GENERAL PHARMACY W/O HCPCS: Performed by: STUDENT IN AN ORGANIZED HEALTH CARE EDUCATION/TRAINING PROGRAM

## 2024-10-24 PROCEDURE — 3700000002 HC GENERAL ANESTHESIA TIME - EACH INCREMENTAL 1 MINUTE: Performed by: SURGERY

## 2024-10-24 PROCEDURE — 96372 THER/PROPH/DIAG INJ SC/IM: CPT | Performed by: SURGERY

## 2024-10-24 PROCEDURE — 3600000004 HC OR TIME - INITIAL BASE CHARGE - PROCEDURE LEVEL FOUR: Performed by: SURGERY

## 2024-10-24 PROCEDURE — 7100000002 HC RECOVERY ROOM TIME - EACH INCREMENTAL 1 MINUTE: Performed by: SURGERY

## 2024-10-24 PROCEDURE — 99291 CRITICAL CARE FIRST HOUR: CPT | Mod: 25 | Performed by: STUDENT IN AN ORGANIZED HEALTH CARE EDUCATION/TRAINING PROGRAM

## 2024-10-24 PROCEDURE — 44970 LAPAROSCOPY APPENDECTOMY: CPT

## 2024-10-24 PROCEDURE — 96375 TX/PRO/DX INJ NEW DRUG ADDON: CPT | Mod: 59

## 2024-10-24 PROCEDURE — 2500000005 HC RX 250 GENERAL PHARMACY W/O HCPCS: Performed by: SURGERY

## 2024-10-24 PROCEDURE — 96372 THER/PROPH/DIAG INJ SC/IM: CPT | Performed by: NURSE PRACTITIONER

## 2024-10-24 PROCEDURE — 74177 CT ABD & PELVIS W/CONTRAST: CPT | Performed by: RADIOLOGY

## 2024-10-24 PROCEDURE — 7100000001 HC RECOVERY ROOM TIME - INITIAL BASE CHARGE: Performed by: SURGERY

## 2024-10-24 PROCEDURE — 81002 URINALYSIS NONAUTO W/O SCOPE: CPT | Performed by: NURSE PRACTITIONER

## 2024-10-24 PROCEDURE — 99238 HOSP IP/OBS DSCHRG MGMT 30/<: CPT

## 2024-10-24 PROCEDURE — 87086 URINE CULTURE/COLONY COUNT: CPT

## 2024-10-24 PROCEDURE — 3600000009 HC OR TIME - EACH INCREMENTAL 1 MINUTE - PROCEDURE LEVEL FOUR: Performed by: SURGERY

## 2024-10-24 PROCEDURE — 2500000004 HC RX 250 GENERAL PHARMACY W/ HCPCS (ALT 636 FOR OP/ED): Performed by: STUDENT IN AN ORGANIZED HEALTH CARE EDUCATION/TRAINING PROGRAM

## 2024-10-24 PROCEDURE — 2550000001 HC RX 255 CONTRASTS: Performed by: NURSE PRACTITIONER

## 2024-10-24 PROCEDURE — 86140 C-REACTIVE PROTEIN: CPT

## 2024-10-24 PROCEDURE — 99214 OFFICE O/P EST MOD 30 MIN: CPT | Performed by: NURSE PRACTITIONER

## 2024-10-24 PROCEDURE — 44970 LAPAROSCOPY APPENDECTOMY: CPT | Performed by: SURGERY

## 2024-10-24 PROCEDURE — 2720000007 HC OR 272 NO HCPCS: Performed by: SURGERY

## 2024-10-24 PROCEDURE — 96374 THER/PROPH/DIAG INJ IV PUSH: CPT | Mod: 59

## 2024-10-24 PROCEDURE — 36415 COLL VENOUS BLD VENIPUNCTURE: CPT

## 2024-10-24 PROCEDURE — 85025 COMPLETE CBC W/AUTO DIFF WBC: CPT

## 2024-10-24 PROCEDURE — 2500000004 HC RX 250 GENERAL PHARMACY W/ HCPCS (ALT 636 FOR OP/ED): Performed by: SURGERY

## 2024-10-24 PROCEDURE — 88304 TISSUE EXAM BY PATHOLOGIST: CPT | Mod: TC,ELYLAB

## 2024-10-24 PROCEDURE — 74177 CT ABD & PELVIS W/CONTRAST: CPT

## 2024-10-24 PROCEDURE — 80048 BASIC METABOLIC PNL TOTAL CA: CPT

## 2024-10-24 PROCEDURE — 3700000001 HC GENERAL ANESTHESIA TIME - INITIAL BASE CHARGE: Performed by: SURGERY

## 2024-10-24 PROCEDURE — 84702 CHORIONIC GONADOTROPIN TEST: CPT

## 2024-10-24 RX ORDER — FENTANYL CITRATE 50 UG/ML
25 INJECTION, SOLUTION INTRAMUSCULAR; INTRAVENOUS EVERY 5 MIN PRN
Status: DISCONTINUED | OUTPATIENT
Start: 2024-10-24 | End: 2024-10-24 | Stop reason: HOSPADM

## 2024-10-24 RX ORDER — HEPARIN SODIUM 5000 [USP'U]/ML
5000 INJECTION, SOLUTION INTRAVENOUS; SUBCUTANEOUS ONCE
Status: COMPLETED | OUTPATIENT
Start: 2024-10-24 | End: 2024-10-24

## 2024-10-24 RX ORDER — ONDANSETRON HYDROCHLORIDE 2 MG/ML
4 INJECTION, SOLUTION INTRAVENOUS ONCE
Status: COMPLETED | OUTPATIENT
Start: 2024-10-24 | End: 2024-10-24

## 2024-10-24 RX ORDER — KETOROLAC TROMETHAMINE 30 MG/ML
30 INJECTION, SOLUTION INTRAMUSCULAR; INTRAVENOUS ONCE
Status: DISCONTINUED | OUTPATIENT
Start: 2024-10-24 | End: 2024-10-24

## 2024-10-24 RX ORDER — ONDANSETRON 4 MG/1
4 TABLET, ORALLY DISINTEGRATING ORAL EVERY 8 HOURS PRN
Status: DISCONTINUED | OUTPATIENT
Start: 2024-10-24 | End: 2024-10-25 | Stop reason: HOSPADM

## 2024-10-24 RX ORDER — HYDROCODONE BITARTRATE AND ACETAMINOPHEN 5; 325 MG/1; MG/1
1 TABLET ORAL EVERY 6 HOURS PRN
Qty: 12 TABLET | Refills: 0 | Status: SHIPPED | OUTPATIENT
Start: 2024-10-24 | End: 2024-10-27

## 2024-10-24 RX ORDER — ROCURONIUM BROMIDE 10 MG/ML
INJECTION, SOLUTION INTRAVENOUS AS NEEDED
Status: DISCONTINUED | OUTPATIENT
Start: 2024-10-24 | End: 2024-10-24

## 2024-10-24 RX ORDER — HYDROCODONE BITARTRATE AND ACETAMINOPHEN 5; 325 MG/1; MG/1
2 TABLET ORAL EVERY 6 HOURS PRN
Status: DISCONTINUED | OUTPATIENT
Start: 2024-10-24 | End: 2024-10-25 | Stop reason: HOSPADM

## 2024-10-24 RX ORDER — HYDROCODONE BITARTRATE AND ACETAMINOPHEN 5; 325 MG/1; MG/1
1 TABLET ORAL EVERY 6 HOURS PRN
Status: DISCONTINUED | OUTPATIENT
Start: 2024-10-24 | End: 2024-10-25 | Stop reason: HOSPADM

## 2024-10-24 RX ORDER — KETOROLAC TROMETHAMINE 30 MG/ML
30 INJECTION, SOLUTION INTRAMUSCULAR; INTRAVENOUS ONCE
Status: COMPLETED | OUTPATIENT
Start: 2024-10-24 | End: 2024-10-24

## 2024-10-24 RX ORDER — SODIUM CHLORIDE 0.9 G/100ML
IRRIGANT IRRIGATION AS NEEDED
Status: DISCONTINUED | OUTPATIENT
Start: 2024-10-24 | End: 2024-10-24 | Stop reason: HOSPADM

## 2024-10-24 RX ORDER — OXYCODONE HYDROCHLORIDE 5 MG/1
5 TABLET ORAL EVERY 4 HOURS PRN
Status: DISCONTINUED | OUTPATIENT
Start: 2024-10-24 | End: 2024-10-24

## 2024-10-24 RX ORDER — IBUPROFEN 600 MG/1
600 TABLET ORAL EVERY 6 HOURS PRN
Qty: 90 TABLET | Refills: 0 | Status: SHIPPED | OUTPATIENT
Start: 2024-10-24 | End: 2024-10-25 | Stop reason: HOSPADM

## 2024-10-24 RX ORDER — MIDAZOLAM HYDROCHLORIDE 1 MG/ML
INJECTION, SOLUTION INTRAMUSCULAR; INTRAVENOUS AS NEEDED
Status: DISCONTINUED | OUTPATIENT
Start: 2024-10-24 | End: 2024-10-24

## 2024-10-24 RX ORDER — ACETAMINOPHEN 325 MG/1
650 TABLET ORAL EVERY 6 HOURS PRN
Status: DISCONTINUED | OUTPATIENT
Start: 2024-10-24 | End: 2024-10-25 | Stop reason: HOSPADM

## 2024-10-24 RX ORDER — SUCCINYLCHOLINE CHLORIDE 100 MG/5ML
SYRINGE (ML) INTRAVENOUS AS NEEDED
Status: DISCONTINUED | OUTPATIENT
Start: 2024-10-24 | End: 2024-10-24

## 2024-10-24 RX ORDER — ONDANSETRON HYDROCHLORIDE 2 MG/ML
INJECTION, SOLUTION INTRAVENOUS AS NEEDED
Status: DISCONTINUED | OUTPATIENT
Start: 2024-10-24 | End: 2024-10-24

## 2024-10-24 RX ORDER — ONDANSETRON HYDROCHLORIDE 2 MG/ML
4 INJECTION, SOLUTION INTRAVENOUS ONCE AS NEEDED
Status: DISCONTINUED | OUTPATIENT
Start: 2024-10-24 | End: 2024-10-24 | Stop reason: HOSPADM

## 2024-10-24 RX ORDER — MEROPENEM 1 G/1
1 INJECTION, POWDER, FOR SOLUTION INTRAVENOUS ONCE
Status: COMPLETED | OUTPATIENT
Start: 2024-10-24 | End: 2024-10-24

## 2024-10-24 RX ORDER — LABETALOL HYDROCHLORIDE 5 MG/ML
5 INJECTION, SOLUTION INTRAVENOUS ONCE AS NEEDED
Status: DISCONTINUED | OUTPATIENT
Start: 2024-10-24 | End: 2024-10-24 | Stop reason: HOSPADM

## 2024-10-24 RX ORDER — LIDOCAINE HYDROCHLORIDE 20 MG/ML
INJECTION, SOLUTION INFILTRATION; PERINEURAL AS NEEDED
Status: DISCONTINUED | OUTPATIENT
Start: 2024-10-24 | End: 2024-10-24

## 2024-10-24 RX ORDER — BUPIVACAINE HCL/EPINEPHRINE 0.25-.0005
VIAL (ML) INJECTION AS NEEDED
Status: DISCONTINUED | OUTPATIENT
Start: 2024-10-24 | End: 2024-10-24 | Stop reason: HOSPADM

## 2024-10-24 RX ORDER — SODIUM CHLORIDE, SODIUM LACTATE, POTASSIUM CHLORIDE, CALCIUM CHLORIDE 600; 310; 30; 20 MG/100ML; MG/100ML; MG/100ML; MG/100ML
INJECTION, SOLUTION INTRAVENOUS CONTINUOUS PRN
Status: DISCONTINUED | OUTPATIENT
Start: 2024-10-24 | End: 2024-10-24

## 2024-10-24 RX ORDER — ONDANSETRON 4 MG/1
4 TABLET, ORALLY DISINTEGRATING ORAL EVERY 8 HOURS PRN
Qty: 21 TABLET | Refills: 0 | Status: SHIPPED | OUTPATIENT
Start: 2024-10-24 | End: 2024-10-25 | Stop reason: HOSPADM

## 2024-10-24 RX ORDER — PROPOFOL 10 MG/ML
INJECTION, EMULSION INTRAVENOUS AS NEEDED
Status: DISCONTINUED | OUTPATIENT
Start: 2024-10-24 | End: 2024-10-24

## 2024-10-24 RX ORDER — FENTANYL CITRATE 50 UG/ML
INJECTION, SOLUTION INTRAMUSCULAR; INTRAVENOUS AS NEEDED
Status: DISCONTINUED | OUTPATIENT
Start: 2024-10-24 | End: 2024-10-24

## 2024-10-24 RX ADMIN — HYDROMORPHONE HYDROCHLORIDE 0.5 MG: 1 INJECTION, SOLUTION INTRAMUSCULAR; INTRAVENOUS; SUBCUTANEOUS at 21:38

## 2024-10-24 RX ADMIN — ONDANSETRON 4 MG: 2 INJECTION INTRAMUSCULAR; INTRAVENOUS at 18:37

## 2024-10-24 RX ADMIN — HYDROMORPHONE HYDROCHLORIDE 0.5 MG: 1 INJECTION, SOLUTION INTRAMUSCULAR; INTRAVENOUS; SUBCUTANEOUS at 19:23

## 2024-10-24 RX ADMIN — KETOROLAC TROMETHAMINE 30 MG: 30 INJECTION, SOLUTION INTRAMUSCULAR at 18:37

## 2024-10-24 RX ADMIN — SODIUM CHLORIDE 1000 ML: 9 INJECTION, SOLUTION INTRAVENOUS at 18:37

## 2024-10-24 RX ADMIN — MEROPENEM 1 G: 1 INJECTION, POWDER, FOR SOLUTION INTRAVENOUS at 19:04

## 2024-10-24 SDOH — SOCIAL STABILITY: SOCIAL INSECURITY: WITHIN THE LAST YEAR, HAVE YOU BEEN AFRAID OF YOUR PARTNER OR EX-PARTNER?: NO

## 2024-10-24 SDOH — ECONOMIC STABILITY: INCOME INSECURITY: IN THE PAST 12 MONTHS HAS THE ELECTRIC, GAS, OIL, OR WATER COMPANY THREATENED TO SHUT OFF SERVICES IN YOUR HOME?: NO

## 2024-10-24 SDOH — HEALTH STABILITY: MENTAL HEALTH
DO YOU FEEL STRESS - TENSE, RESTLESS, NERVOUS, OR ANXIOUS, OR UNABLE TO SLEEP AT NIGHT BECAUSE YOUR MIND IS TROUBLED ALL THE TIME - THESE DAYS?: NOT AT ALL

## 2024-10-24 SDOH — SOCIAL STABILITY: SOCIAL INSECURITY: ARE THERE ANY APPARENT SIGNS OF INJURIES/BEHAVIORS THAT COULD BE RELATED TO ABUSE/NEGLECT?: NO

## 2024-10-24 SDOH — HEALTH STABILITY: MENTAL HEALTH: CURRENT SMOKER: 0

## 2024-10-24 SDOH — SOCIAL STABILITY: SOCIAL INSECURITY: WITHIN THE LAST YEAR, HAVE YOU BEEN HUMILIATED OR EMOTIONALLY ABUSED IN OTHER WAYS BY YOUR PARTNER OR EX-PARTNER?: NO

## 2024-10-24 SDOH — SOCIAL STABILITY: SOCIAL INSECURITY: DO YOU FEEL ANYONE HAS EXPLOITED OR TAKEN ADVANTAGE OF YOU FINANCIALLY OR OF YOUR PERSONAL PROPERTY?: NO

## 2024-10-24 SDOH — SOCIAL STABILITY: SOCIAL INSECURITY: HAS ANYONE EVER THREATENED TO HURT YOUR FAMILY OR YOUR PETS?: NO

## 2024-10-24 SDOH — SOCIAL STABILITY: SOCIAL INSECURITY: DO YOU FEEL UNSAFE GOING BACK TO THE PLACE WHERE YOU ARE LIVING?: NO

## 2024-10-24 SDOH — SOCIAL STABILITY: SOCIAL INSECURITY: ABUSE: ADULT

## 2024-10-24 SDOH — ECONOMIC STABILITY: FOOD INSECURITY: WITHIN THE PAST 12 MONTHS, THE FOOD YOU BOUGHT JUST DIDN'T LAST AND YOU DIDN'T HAVE MONEY TO GET MORE.: NEVER TRUE

## 2024-10-24 SDOH — SOCIAL STABILITY: SOCIAL INSECURITY: ARE YOU OR HAVE YOU BEEN THREATENED OR ABUSED PHYSICALLY, EMOTIONALLY, OR SEXUALLY BY ANYONE?: NO

## 2024-10-24 SDOH — SOCIAL STABILITY: SOCIAL INSECURITY
WITHIN THE LAST YEAR, HAVE YOU BEEN RAPED OR FORCED TO HAVE ANY KIND OF SEXUAL ACTIVITY BY YOUR PARTNER OR EX-PARTNER?: NO

## 2024-10-24 SDOH — SOCIAL STABILITY: SOCIAL INSECURITY: WERE YOU ABLE TO COMPLETE ALL THE BEHAVIORAL HEALTH SCREENINGS?: YES

## 2024-10-24 SDOH — ECONOMIC STABILITY: FOOD INSECURITY: WITHIN THE PAST 12 MONTHS, YOU WORRIED THAT YOUR FOOD WOULD RUN OUT BEFORE YOU GOT THE MONEY TO BUY MORE.: NEVER TRUE

## 2024-10-24 SDOH — SOCIAL STABILITY: SOCIAL INSECURITY: HAVE YOU HAD THOUGHTS OF HARMING ANYONE ELSE?: NO

## 2024-10-24 SDOH — SOCIAL STABILITY: SOCIAL INSECURITY: DOES ANYONE TRY TO KEEP YOU FROM HAVING/CONTACTING OTHER FRIENDS OR DOING THINGS OUTSIDE YOUR HOME?: NO

## 2024-10-24 SDOH — SOCIAL STABILITY: SOCIAL INSECURITY: HAVE YOU HAD ANY THOUGHTS OF HARMING ANYONE ELSE?: YES

## 2024-10-24 ASSESSMENT — COGNITIVE AND FUNCTIONAL STATUS - GENERAL
MOBILITY SCORE: 24
DAILY ACTIVITIY SCORE: 24
PATIENT BASELINE BEDBOUND: NO

## 2024-10-24 ASSESSMENT — PAIN SCALES - GENERAL
PAINLEVEL_OUTOF10: 6
PAINLEVEL_OUTOF10: 5 - MODERATE PAIN
PAINLEVEL_OUTOF10: 3
PAINLEVEL_OUTOF10: 3
PAINLEVEL_OUTOF10: 0 - NO PAIN
PAINLEVEL_OUTOF10: 4
PAINLEVEL_OUTOF10: 8
PAINLEVEL_OUTOF10: 2
PAINLEVEL_OUTOF10: 7

## 2024-10-24 ASSESSMENT — ACTIVITIES OF DAILY LIVING (ADL)
HEARING - RIGHT EAR: FUNCTIONAL
PATIENT'S MEMORY ADEQUATE TO SAFELY COMPLETE DAILY ACTIVITIES?: YES
WALKS IN HOME: INDEPENDENT
ADEQUATE_TO_COMPLETE_ADL: YES
DRESSING YOURSELF: INDEPENDENT
GROOMING: INDEPENDENT
FEEDING YOURSELF: INDEPENDENT
BATHING: INDEPENDENT
HEARING - LEFT EAR: FUNCTIONAL
FEEDING YOURSELF: INDEPENDENT
ADEQUATE_TO_COMPLETE_ADL: YES
TOILETING: INDEPENDENT
PATIENT'S MEMORY ADEQUATE TO SAFELY COMPLETE DAILY ACTIVITIES?: YES
JUDGMENT_ADEQUATE_SAFELY_COMPLETE_DAILY_ACTIVITIES: YES
BATHING: INDEPENDENT
JUDGMENT_ADEQUATE_SAFELY_COMPLETE_DAILY_ACTIVITIES: YES
HEARING - RIGHT EAR: FUNCTIONAL
TOILETING: INDEPENDENT
LACK_OF_TRANSPORTATION: NO
GROOMING: INDEPENDENT
DRESSING YOURSELF: INDEPENDENT
HEARING - LEFT EAR: FUNCTIONAL
WALKS IN HOME: INDEPENDENT

## 2024-10-24 ASSESSMENT — PAIN DESCRIPTION - DESCRIPTORS: DESCRIPTORS: ACHING

## 2024-10-24 ASSESSMENT — ENCOUNTER SYMPTOMS
CONSTITUTIONAL NEGATIVE: 1
ENDOCRINE NEGATIVE: 1
NAUSEA: 1
VOMITING: 0
CHILLS: 0
FEVER: 0
SHORTNESS OF BREATH: 0
CONSTIPATION: 0
COUGH: 0
PALPITATIONS: 0
RESPIRATORY NEGATIVE: 1
EYES NEGATIVE: 1
GASTROINTESTINAL NEGATIVE: 1
NEUROLOGICAL NEGATIVE: 1
CARDIOVASCULAR NEGATIVE: 1
ALLERGIC/IMMUNOLOGIC NEGATIVE: 1
DIAPHORESIS: 0
APPETITE CHANGE: 1
ABDOMINAL PAIN: 1
DIARRHEA: 0
PSYCHIATRIC NEGATIVE: 1
MUSCULOSKELETAL NEGATIVE: 1
HEMATOLOGIC/LYMPHATIC NEGATIVE: 1

## 2024-10-24 ASSESSMENT — LIFESTYLE VARIABLES
HOW OFTEN DO YOU HAVE 6 OR MORE DRINKS ON ONE OCCASION: NEVER
HOW MANY STANDARD DRINKS CONTAINING ALCOHOL DO YOU HAVE ON A TYPICAL DAY: PATIENT DOES NOT DRINK
TOTAL SCORE: 0
EVER HAD A DRINK FIRST THING IN THE MORNING TO STEADY YOUR NERVES TO GET RID OF A HANGOVER: NO
SKIP TO QUESTIONS 9-10: 1
EVER FELT BAD OR GUILTY ABOUT YOUR DRINKING: NO
HAVE PEOPLE ANNOYED YOU BY CRITICIZING YOUR DRINKING: NO
HOW OFTEN DO YOU HAVE A DRINK CONTAINING ALCOHOL: NEVER
AUDIT-C TOTAL SCORE: 0
AUDIT-C TOTAL SCORE: 0
HAVE YOU EVER FELT YOU SHOULD CUT DOWN ON YOUR DRINKING: NO

## 2024-10-24 ASSESSMENT — PAIN - FUNCTIONAL ASSESSMENT
PAIN_FUNCTIONAL_ASSESSMENT: 0-10

## 2024-10-24 ASSESSMENT — PATIENT HEALTH QUESTIONNAIRE - PHQ9
SUM OF ALL RESPONSES TO PHQ9 QUESTIONS 1 & 2: 0
2. FEELING DOWN, DEPRESSED OR HOPELESS: NOT AT ALL
1. LITTLE INTEREST OR PLEASURE IN DOING THINGS: NOT AT ALL

## 2024-10-24 ASSESSMENT — PAIN DESCRIPTION - PAIN TYPE: TYPE: ACUTE PAIN

## 2024-10-24 ASSESSMENT — PAIN DESCRIPTION - ORIENTATION
ORIENTATION: RIGHT
ORIENTATION: MID

## 2024-10-24 ASSESSMENT — PAIN DESCRIPTION - LOCATION
LOCATION: ABDOMEN
LOCATION: ABDOMEN

## 2024-10-24 NOTE — ED PROVIDER NOTES
"HPI   Chief Complaint   Patient presents with    Abdominal Pain     RUQ pain with nausea/vomiting, was sent by pcp for gallstones and r/o appendicitis        History provided by: Patient    Limitations to history: None    CC: Abdominal pain    HPI: 28-year-old female with a history of anxiety depression presents the emergency department to be evaluated for abdominal pain. patient states the abdominal pain started yesterday.  She characterized the pain as \"sharp \"and localized to the right lower quadrant.  The pain is there constantly nothing particular makes it better or worse.  Denies taking anything for her pain prior to arrival.  Reports nausea and 2 episodes of nonbloody vomiting due to the pain.  Denies urgency frequency dysuria.  Denies blood in the urine or stool.  Denies vaginal bleeding or discharge.  She had outpatient blood work today that showed a CRP of 10 and leukocytosis 14 but otherwise was unremarkable.  Her outpatient CT showed questionable appendicitis so she was referred to the emergency department.  Denies all other systemic symptoms.    ROS: Negative unless mentioned in HPI    Medical Hx: Patient    Physical exam:    Constitutional: Patient is well-nourished and well-developed.  Appears to be very uncomfortable but nontoxic in appearance.  Oriented to person, place, time, and situation.    HEENT: Head is normocephalic, atraumatic. Patient's airway is patent.  Tympanic membranes are clear bilaterally.  Nasal mucosa clear.  Mouth with normal mucosa.  Throat is not erythematous and there are no oropharyngeal exudates, uvula is midline.  No obvious facial deformities.    Eyes: Clear bilaterally.  Pupils are equal round and reactive to light and accommodation.  Extraocular movements intact.      Cardiac: Regular rate, regular rhythm.  Heart sounds S1, S2.  No murmurs, rubs, or gallops.  PMI nondisplaced.  No JVD.    Respiratory: Regular respiratory rate and effort.  Breath sounds are clear and equal " bilaterally, no adventitious lung sounds.  Patient is speaking in full sentences and is in no apparent respiratory distress. No use of accessory muscles.      Gastrointestinal: Right lower quadrant tenderness to palpation.  Abdomen is soft and nondistended.  Positive McBurney point.  Positive psoas and  sign.  Positive Rovsing sign.  There are no obvious deformities.  No rebound tenderness or guarding.  Bowel sounds are normal active.    Genitourinary: No CVA or flank tenderness.    Musculoskeletal: No reproducible tenderness.  No obvious skin or bony deformities.  Patient has equal range of motion in all extremities and no strength deficiencies.  No muscle or joint tenderness. No back or neck tenderness.  Capillary refill less than 3 seconds.  Strong peripheral pulses.  No sensory deficits.    Neurological: Patient is alert and oriented.  No focal deficits.  5/5 strength in all extremities.  Cranial nerves II through XII intact. GCS15.     Skin: Skin is normal color for race and is warm, dry, and intact.  No evidence of trauma.  No lesions, rashes, bruising, jaundice, or masses.    Psych: Appropriate mood and affect.  No apparent risk to self or others.    Heme/lymph: No adenopathy, lymphadenopathy, or splenomegaly    Physical exam is otherwise negative unless stated above or in history of present illness.              Patient History   Past Medical History:   Diagnosis Date    Anxiety     Depression     Elevated blood pressure reading     Postoperative wound infection 10/26/24     Past Surgical History:   Procedure Laterality Date     SECTION, LOW TRANSVERSE N/A 10/19/2023    PELVIC LAPAROSCOPY  2024    IUD ressection after abnormal migration    TONSILECTOMY, ADENOIDECTOMY, BILATERAL MYRINGOTOMY AND TUBES       Family History   Problem Relation Name Age of Onset    Other (cardiac disorder) Mother Natalie     Hypertension Mother Natalie     Other (mental disorder) Mother Natalie     Thyroid  disease Mother Natalie     Diabetes Father Rober     Hypertension Father Rober     Asthma Sister Nia Pineda     Immunodeficiency Sister Nia Pineda     Other (cardiac disease) Paternal Grandmother Samina     Stroke Paternal Grandmother Samina     Asthma Sister Nia      Social History     Tobacco Use    Smoking status: Never    Smokeless tobacco: Never   Vaping Use    Vaping status: Unknown   Substance Use Topics    Alcohol use: Not Currently    Drug use: Never       Physical Exam   ED Triage Vitals [10/24/24 1759]   Temperature Heart Rate Respirations BP   36.7 °C (98.1 °F) (!) 126 17 145/88      Pulse Ox Temp Source Heart Rate Source Patient Position   97 % Temporal Monitor --      BP Location FiO2 (%)     -- --       Physical Exam      ED Course & MDM   Diagnoses as of 10/24/24 1901   Acute appendicitis, unspecified acute appendicitis type     Patient updated on plan for lab testing, IV insertion, radiology imaging, and medications to be administered while in the ER (if indicated). Patient updated on expected wait times for testing and results. Patient provided my name and told to ask any staff member for questions or concerns if they should arise. Electronic medical record reviewed.     [facility]    Upon initial assessment, patient  appears to be very uncomfortable but nontoxic in appearance.    Patient presented to the emergency department with the chief complaint abdominal pain.  Right lower quadrant tenderness to palpation.  Abdomen is soft and nondistended.  Positive McBurney point.  Positive psoas and  sign.  Positive Rovsing sign.  There are no obvious deformities.  No rebound tenderness or guarding.  Bowel sounds are normal active.  On arrival to the emergency department, vital signs were significant tachycardia likely due to her acute discomfort.   Will give the patient IV normal saline as well as Toradol, Zofran, and Dilaudid for her discomfort.  We did speak to Dr. Pichardo with general surgery who is agreeable  to take the patient up to the OR for an appendectomy.  Agreeable to start the patient on IV antibiotics based on her allergies.  Will admit her under general surgery services afterwards.  Patient verbalized understanding and agreement with the treatment plan and she remained hemodynamically stable in the ER.    This note was dictated using a speech recognition program.  While an attempt was made at proof-reading to minimize errors, minor errors in transcription may be present            No data recorded     Murali Coma Scale Score: 15 (10/24/24 1802 : Kamille Magaña RN)                           Medical Decision Making      Procedure  Procedures     García aBker PA-C  10/24/24 1904       García Baker PA-C  10/24/24 1953

## 2024-10-24 NOTE — TELEPHONE ENCOUNTER
I placed a phone call to Rosalva Pineda.  See telephone message.  After review this patient appears to have appendicitis with an elevated leukocytosis and was advised to go to the emergency room today

## 2024-10-24 NOTE — TELEPHONE ENCOUNTER
Patient states she had outpatient CT ordered by Sirisha. She would like to know if you can review it? She wants to know if she needs to go to the ER-     Thanks

## 2024-10-24 NOTE — ANESTHESIA PREPROCEDURE EVALUATION
Rosalva Pineda is a 28 y.o. female here for:    Appendectomy Laparoscopy  With Basia Pichardo MD  Pre-Op Diagnosis Codes:      * Acute appendicitis, unspecified acute appendicitis type [K35.80]    Relevant Problems   Neuro   (+) Anxiety and depression      Endocrine   (+) Class 1 obesity in adult      Digestive   (+) Acute appendicitis       Lab Results   Component Value Date    HGB 12.6 10/24/2024    HCT 38.6 10/24/2024    WBC 14.4 (H) 10/24/2024     10/24/2024     10/24/2024    K 4.0 10/24/2024     10/24/2024    CREATININE 0.55 10/24/2024    BUN 9 10/24/2024     EKG :  Normal sinus rhythm  Normal ECG  No previous ECGs available    Social History     Tobacco Use   Smoking Status Never   Smokeless Tobacco Never       Allergies   Allergen Reactions    Cefaclor Hives       Current Outpatient Medications   Medication Instructions    ibuprofen 600 mg, oral, Every 6 hours PRN    medroxyPROGESTERone (DEPO-PROVERA) 150 mg, intramuscular, every 12 weeks    ondansetron ODT (ZOFRAN-ODT) 4 mg, oral, Every 8 hours PRN       Past Surgical History:   Procedure Laterality Date     SECTION, LOW TRANSVERSE N/A 10/19/2023    PELVIC LAPAROSCOPY  2024    IUD ressection after abnormal migration    TONSILECTOMY, ADENOIDECTOMY, BILATERAL MYRINGOTOMY AND TUBES         Family History   Problem Relation Name Age of Onset    Other (cardiac disorder) Mother Natalie     Hypertension Mother Natalie     Other (mental disorder) Mother Natalie     Thyroid disease Mother Natalie     Diabetes Father Rober     Hypertension Father Rober     Asthma Sister Nia Pineda     Immunodeficiency Sister Nia Pineda     Other (cardiac disease) Paternal Grandmother Samina     Stroke Paternal Grandmother Samina     Asthma Sister Nia        NPO Details:  No data recorded    Physical Exam    Airway  Mallampati: III  TM distance: >3 FB     Cardiovascular    Dental    Pulmonary    Abdominal            Anesthesia Plan    History of general  anesthesia?: yes  History of complications of general anesthesia?: no    ASA 2 - emergent     general     The patient is not a current smoker.    intravenous induction   Postoperative administration of opioids is intended.  Trial extubation is planned.  Anesthetic plan and risks discussed with patient.

## 2024-10-24 NOTE — PROGRESS NOTES
Subjective   Patient ID: Rosalva Pineda is a 28 y.o. female who presents for Abdominal Pain (Pain started last night in RLQ. It has been rapidly worsening since onset, and the pain is constant. The pain is described as stabbing and is rated 8/10. The symptoms are worsened by lying supine and bending. Also has nausea. No fever. Tried ibuprofen and Tylenol without improvement. ).      Review of Systems   Constitutional:  Positive for appetite change. Negative for chills, diaphoresis and fever.   Respiratory:  Negative for cough and shortness of breath.    Cardiovascular:  Negative for chest pain and palpitations.   Gastrointestinal:  Positive for abdominal pain (RLQ) and nausea. Negative for constipation, diarrhea and vomiting.     Objective   /82   Pulse 110   Temp 36.6 °C (97.9 °F)   Resp 20   Wt 85.9 kg (189 lb 6.4 oz)   SpO2 98%   BMI 31.52 kg/m²     Physical Exam  Constitutional:       General: She is in acute distress.      Appearance: Normal appearance. She is obese. She is ill-appearing. She is not toxic-appearing.   Cardiovascular:      Rate and Rhythm: Regular rhythm.      Heart sounds: No murmur heard.  Pulmonary:      Effort: Pulmonary effort is normal.   Abdominal:      General: Bowel sounds are normal. There is no distension.      Palpations: Abdomen is soft.      Tenderness: There is abdominal tenderness in the right lower quadrant. There is guarding and rebound. There is no right CVA tenderness or left CVA tenderness. Positive signs include Rovsing's sign, McBurney's sign, psoas sign and obturator sign.      Hernia: No hernia is present.   Skin:     General: Skin is warm and dry.   Neurological:      Mental Status: She is alert.     Assessment/Plan   1. Right lower quadrant abdominal pain  POCT UA (nonautomated) manually resulted    Urine Culture    CT abdomen pelvis w IV contrast    ketorolac (Toradol) injection 30 mg    CBC and Auto Differential    C-reactive protein    Basic Metabolic  Panel    HCG, quantitative, pregnancy    ibuprofen 600 mg tablet    CANCELED: CT abdomen pelvis w IV contrast    Exam concerning for appendicitis. However, can't r/o ovary or other female organ concern. Will get stat CT abd/pelvis. Medicate for pain. PRN antiemetic.      2. Flank pain        3. Nausea  ondansetron ODT (Zofran-ODT) 4 mg disintegrating tablet    HCG, quantitative, pregnancy

## 2024-10-24 NOTE — ED PROCEDURE NOTE
Procedure  Critical Care    Performed by: Mt Jung MD  Authorized by: Mt Jung MD    Critical care provider statement:     Critical care time (minutes):  32  Comments:      Critical Care Time  Authorized and Performed by: Mt Jung MD  Total critical care time: [32] minutes  Due to a high probability of clinically significant, life threatening deterioration, the patient required my highest level of preparedness to intervene emergently and I personally spent this critical care time directly and personally managing the patient. This critical care time included obtaining a history; examining the patient; pulse oximetry; ordering and review of studies; arranging urgent treatment with development of a management plan; evaluation of patient's response to treatment; frequent reassessment; and, discussions with other providers and patient/family.  This critical care time was performed to assess and manage the high probability of imminent, life-threatening deterioration that could result in multi-organ failure. It was exclusive of separately billable procedures and treating other patients and teaching time.  Please see MDM section and the rest of the note for further information on patient assessment and treatment.             Mt Jung MD  10/24/24 1921

## 2024-10-24 NOTE — TELEPHONE ENCOUNTER
This patient called the office and requested that I review her chart after she was seen in the urgent care earlier today.  This patient developed right lower quadrant pain and nausea yesterday which got significantly worse and caused her to throw up.  It is progressively worsened and remained in the right lower quadrant.  She has had low-grade fevers and has been taking Tylenol and Motrin.  She was seen in the urgent care where blood work and imaging was ordered.  The CBC reveals a leukocytosis of 14.4 with a left shift.  Urine was clear and hCG is still pending.  CT scan is most likely early appendicitis.  This fits the clinical picture.  I advised her to go to the emergency room and she will most likely need urgent surgery.

## 2024-10-25 VITALS
TEMPERATURE: 98.4 F | HEART RATE: 91 BPM | HEIGHT: 66 IN | SYSTOLIC BLOOD PRESSURE: 110 MMHG | OXYGEN SATURATION: 97 % | WEIGHT: 189 LBS | DIASTOLIC BLOOD PRESSURE: 56 MMHG | RESPIRATION RATE: 16 BRPM | BODY MASS INDEX: 30.37 KG/M2

## 2024-10-25 PROBLEM — K35.80 ACUTE APPENDICITIS, UNSPECIFIED ACUTE APPENDICITIS TYPE: Status: RESOLVED | Noted: 2024-10-24 | Resolved: 2024-10-25

## 2024-10-25 PROBLEM — K35.80 ACUTE APPENDICITIS: Status: RESOLVED | Noted: 2024-10-24 | Resolved: 2024-10-25

## 2024-10-25 PROCEDURE — G0378 HOSPITAL OBSERVATION PER HR: HCPCS

## 2024-10-25 PROCEDURE — 2500000005 HC RX 250 GENERAL PHARMACY W/O HCPCS

## 2024-10-25 PROCEDURE — 2500000001 HC RX 250 WO HCPCS SELF ADMINISTERED DRUGS (ALT 637 FOR MEDICARE OP)

## 2024-10-25 RX ADMIN — HYDROCODONE BITARTRATE AND ACETAMINOPHEN 2 TABLET: 5; 325 TABLET ORAL at 05:31

## 2024-10-25 RX ADMIN — ONDANSETRON 4 MG: 4 TABLET, ORALLY DISINTEGRATING ORAL at 06:44

## 2024-10-25 ASSESSMENT — COGNITIVE AND FUNCTIONAL STATUS - GENERAL
DAILY ACTIVITIY SCORE: 24
MOBILITY SCORE: 24

## 2024-10-25 ASSESSMENT — PAIN SCALES - GENERAL
PAINLEVEL_OUTOF10: 3
PAINLEVEL_OUTOF10: 2
PAINLEVEL_OUTOF10: 8

## 2024-10-25 ASSESSMENT — PAIN DESCRIPTION - LOCATION: LOCATION: ABDOMEN

## 2024-10-25 ASSESSMENT — PAIN DESCRIPTION - DESCRIPTORS: DESCRIPTORS: ACHING

## 2024-10-25 ASSESSMENT — PAIN - FUNCTIONAL ASSESSMENT: PAIN_FUNCTIONAL_ASSESSMENT: 0-10

## 2024-10-25 NOTE — H&P
History Of Present Illness  Rosalva Pineda is a 28 y.o. female presenting with appendicitis.     1 day history of abdominal pain localizing to right lower quadrant, gradual and progressively severe.  Decreased appetite associated with 1 episode of nausea and vomiting.  No fever.    Postpartum 1 year, currently breast-feeding    Past Medical History  Past Medical History:   Diagnosis Date    Anxiety     Depression     Elevated blood pressure reading     Postoperative wound infection 10/26/24       Surgical History  Past Surgical History:   Procedure Laterality Date     SECTION, LOW TRANSVERSE N/A 10/19/2023    PELVIC LAPAROSCOPY  2024    IUD ressection after abnormal migration    TONSILECTOMY, ADENOIDECTOMY, BILATERAL MYRINGOTOMY AND TUBES          Social History  She reports that she has never smoked. She has never used smokeless tobacco. She reports that she does not currently use alcohol. She reports that she does not use drugs.    Family History  Family History   Problem Relation Name Age of Onset    Other (cardiac disorder) Mother Natalie     Hypertension Mother Natalie     Other (mental disorder) Mother Natalie     Thyroid disease Mother Natalie     Diabetes Father Rober     Hypertension Father Rober     Asthma Sister Nia Pineda     Immunodeficiency Sister Nia Pineda     Other (cardiac disease) Paternal Grandmother Samina     Stroke Paternal Grandmother Samina     Asthma Sister Nia         Allergies  Cefaclor    Review of Systems   Constitutional: Negative.    HENT: Negative.     Eyes: Negative.    Respiratory: Negative.     Cardiovascular: Negative.    Gastrointestinal: Negative.    Endocrine: Negative.    Genitourinary: Negative.    Musculoskeletal: Negative.    Skin: Negative.    Allergic/Immunologic: Negative.    Neurological: Negative.    Hematological: Negative.    Psychiatric/Behavioral: Negative.          Physical Exam  Constitutional: Alert, no acute distress  HEENT: Well-developed, anicteric  Neck:  "Supple  Chest: Unlabored respirations  Heart: Regular  Abdomen: Soft, nondistended, tender to palpation of the right lower quadrant with positive Rovsing's no masses [] appreciated  Extremities: Warm and well perfused, no edema  Psychiatric: Oriented appropriately, mood and affect appropriate  Last Recorded Vitals  Blood pressure 132/84, pulse (!) 111, temperature 36.7 °C (98.1 °F), temperature source Temporal, resp. rate 18, height 1.676 m (5' 6\"), weight 85.7 kg (189 lb), SpO2 98%, not currently breastfeeding.    Relevant Results        CT and labs reviewed     Assessment/Plan   Assessment & Plan  Acute appendicitis    Class 1 obesity in adult    Acute appendicitis, unspecified acute appendicitis type      Acute appendicitis: OR for laparoscopic appendectomy.  Indications for procedure discussed as well as risks of bleeding, infection, open conversion, injury to surrounding structures, anesthesia complications cardiac/respiratory/robotic.  Understands and wishes to proceed.  IV antibiotics and DVT prophylaxis provided       I spent  minutes in the professional and overall care of this patient.      Basia Pichardo MD    "

## 2024-10-25 NOTE — ANESTHESIA PROCEDURE NOTES
Airway  Date/Time: 10/24/2024 8:28 PM  Urgency: elective    Airway not difficult    Staffing  Performed: attending   Authorized by: Orville Paredes MD    Performed by: Orville Paredes MD  Patient location during procedure: OR    Indications and Patient Condition  Indications for airway management: anesthesia  Spontaneous Ventilation: absent  Sedation level: deep  Preoxygenated: yes  Patient position: sniffing  Mask difficulty assessment: 0 - not attempted  Planned trial extubation    Final Airway Details  Final airway type: endotracheal airway      Successful airway: ETT  Cuffed: yes   Successful intubation technique: video laryngoscopy  Facilitating devices/methods: intubating stylet  Endotracheal tube insertion site: oral  Blade type: Horner.  Blade size: #4  Cormack-Lehane Classification: grade I - full view of glottis  Placement verified by: capnometry   Measured from: lips  ETT to lips (cm): 23  Number of attempts at approach: 1    Additional Comments  RSI

## 2024-10-25 NOTE — CARE PLAN
The patient's goals for the shift include get rest and be discharged    The clinical goals for the shift include pain control    Problem: Pain - Adult  Goal: Verbalizes/displays adequate comfort level or baseline comfort level  Outcome: Adequate for Discharge

## 2024-10-25 NOTE — OP NOTE
Appendectomy Laparoscopy Operative Note     Date: 10/24/2024  OR Location: [facility]    Name: Rosavla Pineda, : 1995, Age: 28 y.o., MRN: [id number], Sex: female    Diagnosis  Pre-op Diagnosis      * Acute appendicitis, unspecified acute appendicitis type [K35.80] Post-op Diagnosis     * Acute appendicitis, unspecified acute appendicitis type [K35.80]     Procedures  Appendectomy Laparoscopy  800[address] - [place] LAPAROSCOPIC APPENDECTOMY      Surgeons      * Basia Pichardo - Primary    Resident/Fellow/Other Assistant:  Surgeons and Role:     * Doreen Lozada PA-C - Assisting    Procedure Summary  Anesthesia: General  ASA: II  Anesthesia Staff: Anesthesiologist: Orville Paredes MD  Estimated Blood Loss: 0mL  Intra-op Medications:   Administrations occurring from 2100 to 2240 on 10/24/24:   Medication Name Total Dose   BUPivacaine-EPINEPHrine (Marcaine w/EPI) 0.25 %-1:200,000 injection 8 mL   fentaNYL (Sublimaze) injection 50 mcg/mL 50 mcg   ondansetron 2 mg/mL 4 mg   sugammadex (Bridion) 200 mg/2 mL injection 200 mg              Anesthesia Record               Intraprocedure I/O Totals       None           Specimen:   ID Type Source Tests Collected by Time   1 : APPENDIX Tissue APPENDIX SURGICAL PATHOLOGY EXAM Basia Pichardo MD 10/24/2024 2103        Staff:   Circulator: Chun Tinoco Person: Razel         Drains and/or Catheters: * None in log *    Tourniquet Times:         Implants:     Findings: See report    Indications: Rosalva Pineda is an 28 y.o. female who is having surgery for Acute appendicitis, unspecified acute appendicitis type [K35.80].     The patient was seen in the preoperative area. The risks, benefits, complications, treatment options, non-operative alternatives, expected recovery and outcomes were discussed with the patient. The possibilities of reaction to medication, pulmonary aspiration, injury to surrounding structures, bleeding, recurrent infection, the need for additional procedures, failure to  diagnose a condition, and creating a complication requiring transfusion or operation were discussed with the patient. The patient concurred with the proposed plan, giving informed consent.  The site of surgery was properly noted/marked if necessary per policy. The patient has been actively warmed in preoperative area. Preoperative antibiotics given.  Venous thrombosis prophylaxis provided.    Procedure Details: After satisfactory induction of anesthesia, patient was prepped and draped.  Local anesthesia was infiltrated and limited infraumbilical midline incision was made.  Dissection carried down to the fascia which was then sharply incised allowing placement of a Macdonald trocar.  Pneumoperitoneum was established and exploratory laparoscopy was carried out.  Under direct visualization 2 additional 5 mm trocars were placed in the suprapubic left lower quadrant locations.  Initial survey revealed no pathology.  Appendix was identified and found to be acutely inflamed, markedly distended and with inflammatory rind.  No evidence of necrosis or perforation.  No free fluid present.  Mesoappendix divided with the LigaSure device.  Endo CHUCKY stapler used to divide the appendix at its base and the cecum and the appendix was retrieved from the abdomen using an Endo Catch bag.  Copious irrigation was undertaken throughout the lower abdomen and aspirated till clear, hemostasis of the operative site assured.  The remainder of the laparoscopy was unremarkable.  Trocars removed under direct visualization, fascial defect reapproximated with 0 Vicryl suture in a figure-of-eight manner skin of all sites reapproximated with Monocryl.  Dressings applied.  Patient awakened anesthesia and transferred to recovery in satisfactory condition, tolerated well.  Complications:  None; patient tolerated the procedure well.    Disposition: PACU - hemodynamically stable.  Condition: stable         Additional Details: Certified surgical assistant  required for all aspects of his procedure including instrumentation and retraction, visualization    Attending Attestation:     Basia Pichardo  Phone Number: [phone]

## 2024-10-25 NOTE — ANESTHESIA POSTPROCEDURE EVALUATION
Patient: Rosalva Pineda    Procedure Summary       Date: 10/24/24 Room / Location: [facility] OR 12 / [facility]    Anesthesia Start: 2020 Anesthesia Stop: 2127    Procedure: Appendectomy Laparoscopy Diagnosis:       Acute appendicitis, unspecified acute appendicitis type      (Acute appendicitis, unspecified acute appendicitis type [K35.80])    Surgeons: Basia Pichardo MD Responsible Provider: Orville Paredes MD    Anesthesia Type: general ASA Status: 2 - Emergent            Anesthesia Type: general    Vitals Value Taken Time   /57 10/24/24 2127   Temp 36.9 10/24/24 2127   Pulse 119 10/24/24 2127   Resp 14 10/24/24 2127   SpO2 96% 10/24/24 2127       Anesthesia Post Evaluation    Patient location during evaluation: PACU  Patient participation: complete - patient participated  Level of consciousness: awake and alert  Pain management: adequate  Multimodal analgesia pain management approach  Airway patency: patent  Cardiovascular status: acceptable  Respiratory status: acceptable  Hydration status: acceptable  Postoperative Nausea and Vomiting: none        There were no known notable events for this encounter.

## 2024-10-25 NOTE — SIGNIFICANT EVENT
Patient seen and assessed postoperatively after arrival to the floor.  Patient is awake and alert and states that she is feeling well.  Denies nausea.  Vital signs are stable.  Laparoscopy incisions clean, dry, and intact.  Expected postoperative pain well controlled.  Will reevaluate patient in the morning prior to discharge.

## 2024-10-25 NOTE — DISCHARGE SUMMARY
Discharge Diagnosis  Acute appendicitis    Issues Requiring Follow-Up  Postop follow-up with Dr. Pichardo.  Call office day of discharge to schedule appointment for 2 weeks out.    Test Results Pending At Discharge  Pending Labs       Order Current Status    Surgical Pathology Exam Collected (10/24/24 2103)            Hospital Course   28-year-old female who presented to the [facility] ED to be evaluated for abdominal pain.  Outpatient blood work was performed the day of discharge that showed CRP of 10 and leukocytosis of 14 but was otherwise unremarkable.  Outpatient CT showed questionable appendicitis so she was referred to the emergency department. After discussion of risk versus benefits of procedure with Dr. Pichardo the patient elected to undergo laparoscopic appendectomy.  The patient tolerated the procedure well and without complications. On exam morning of discharge, patient's incisions are clean, dry, and intact.  Postop pain is of expected nature and tolerable per patient.  Denies nausea.  Tolerating p.o. She states she does have a mild headache and that she often gets headaches when she is on her Depo-Provera.  Patient denies lightheadedness, dizziness, visual changes.  And states that she feels okay to go home.  Patient was instructed to call the office or return to the emergency department for fever above 100.4, cannot eat or drink, having trouble urinating, chest pain or shortness of breath, pain is uncontrolled, or headache becomes worse. She is being discharged in stable condition.    Pertinent Physical Exam At Time of Discharge  Physical Exam  Constitutional:       General: She is not in acute distress.     Appearance: She is not ill-appearing.   Cardiovascular:      Rate and Rhythm: Tachycardia present.      Heart sounds: Normal heart sounds.      Comments: Mild tachycardia of 100 at 0530 vitals but no shortness of breath, palpitations, cough, or chest pain.  Pulmonary:      Effort: Pulmonary effort is  normal.      Breath sounds: Normal breath sounds.   Abdominal:      General: There is no distension.      Palpations: Abdomen is soft.      Tenderness: There is abdominal tenderness. There is no guarding or rebound.      Comments: Expected postoperative tenderness   Neurological:      Mental Status: She is alert and oriented to person, place, and time.   Psychiatric:         Mood and Affect: Mood normal.         Behavior: Behavior normal.         Thought Content: Thought content normal.         Home Medications     Medication List      START taking these medications     HYDROcodone-acetaminophen 5-325 mg tablet; Commonly known as: Norco;   Take 1 tablet by mouth every 6 hours if needed for moderate pain (4 - 6)   or severe pain (7 - 10) for up to 3 days.     CONTINUE taking these medications     medroxyPROGESTERone 150 mg/mL injection; Commonly known as:   Depo-Provera; Inject 1 mL (150 mg) into the muscle every 12 weeks.     STOP taking these medications     ibuprofen 600 mg tablet   ondansetron ODT 4 mg disintegrating tablet; Commonly known as:   Zofran-ODT       Outpatient Follow-Up  Future Appointments   Date Time Provider Department Center   2/21/2025  9:40 AM [id number] [place]       Brittny Thomas, APRN-CNP

## 2024-10-25 NOTE — CARE PLAN
The patient's goals for the shift include get rest and be discharged    Problem: Pain - Adult  Goal: Verbalizes/displays adequate comfort level or baseline comfort level  Outcome: Progressing     The clinical goals for the shift include Patient will have stable vital signs throughtout shfit    Over the shift, the patient remained free from falls and injury throughout shift. Patient currently resting comfortably without complaints.

## 2024-10-26 LAB — BACTERIA UR CULT: NORMAL

## 2024-10-29 ENCOUNTER — PATIENT OUTREACH (OUTPATIENT)
Dept: PRIMARY CARE | Facility: CLINIC | Age: 29
End: 2024-10-29
Payer: COMMERCIAL

## 2024-11-07 NOTE — PROGRESS NOTES
Subjective   Patient ID: Rosalva Pineda is a 29 y.o. female who presents for No chief complaint on file..  HPI  Postoperative visit following laparoscopic appendectomy, pathology consistent with acute appendicitis.        Review of Systems      Past Medical History:   Diagnosis Date    Anxiety     Depression     Elevated blood pressure reading     Postoperative wound infection 10/26/24     Past Surgical History:   Procedure Laterality Date     SECTION, LOW TRANSVERSE N/A 10/19/2023    PELVIC LAPAROSCOPY  2024    IUD ressection after abnormal migration    TONSILECTOMY, ADENOIDECTOMY, BILATERAL MYRINGOTOMY AND TUBES       Family History   Problem Relation Name Age of Onset    Other (cardiac disorder) Mother Natalie     Hypertension Mother Natalie     Other (mental disorder) Mother Natalie     Thyroid disease Mother Natalie     Diabetes Father Rober     Hypertension Father Rober     Asthma Sister Nia Pineda     Immunodeficiency Sister Nia Pineda     Other (cardiac disease) Paternal Grandmother Samina     Stroke Paternal Grandmother Samina     Asthma Sister Nia       Social History     Socioeconomic History    Marital status: Significant Other     Spouse name: Jem Rodriguez   Occupational History    Occupation: Doctor's    Tobacco Use    Smoking status: Never    Smokeless tobacco: Never   Vaping Use    Vaping status: Unknown   Substance and Sexual Activity    Alcohol use: Not Currently    Drug use: Never    Sexual activity: Yes     Partners: Male     Birth control/protection: Other, None     Comment: Depo injection     Social Drivers of Health     Financial Resource Strain: Low Risk  (10/24/2024)    Overall Financial Resource Strain (CARDIA)     Difficulty of Paying Living Expenses: Not very hard   Food Insecurity: No Food Insecurity (10/24/2024)    Hunger Vital Sign     Worried About Running Out of Food in the Last Year: Never true     Ran Out of Food in the Last Year: Never true   Transportation Needs:  No Transportation Needs (10/24/2024)    PRAPARE - Transportation     Lack of Transportation (Medical): No     Lack of Transportation (Non-Medical): No   Physical Activity: Sufficiently Active (10/18/2023)    Exercise Vital Sign     Days of Exercise per Week: 5 days     Minutes of Exercise per Session: 30 min   Stress: No Stress Concern Present (10/24/2024)    [employer] [employer] - Occupational Stress Questionnaire     Feeling of Stress : Not at all   Social Connections: Moderately Isolated (10/18/2023)    Social Connection and Isolation Panel [NHANES]     Frequency of Communication with Friends and Family: More than three times a week     Frequency of Social Gatherings with Friends and Family: More than three times a week     Attends Mandaeism Services: Never     Active Member of Clubs or Organizations: No     Attends Club or Organization Meetings: Never     Marital Status: Living with partner   Intimate Partner Violence: Not At Risk (10/24/2024)    Humiliation, Afraid, Rape, and Kick questionnaire     Fear of Current or Ex-Partner: No     Emotionally Abused: No     Physically Abused: No     Sexually Abused: No   Housing Stability: Low Risk  (10/24/2024)    Housing Stability Vital Sign     Unable to Pay for Housing in the Last Year: No     Number of Times Moved in the Last Year: 0     Homeless in the Last Year: No          Current Outpatient Medications:     medroxyPROGESTERone (Depo-Provera) 150 mg/mL injection, Inject 1 mL (150 mg) into the muscle every 12 weeks., Disp: 1 mL, Rfl: 3     Objective   There were no vitals filed for this visit.   Physical Exam    Constitutional: Alert, no acute distress  HEENT: Well-developed, anicteric  Neck: Supple  Chest: Unlabored respirations  Heart: Regular  Abdomen: Soft, nondistended, nontender no masses [] appreciated.  Incisions well-healed without hernia  Extremities: Warm and well perfused, no edema  Psychiatric: Oriented appropriately, mood and affect  appropriate  Assessment/Plan     Over doing well from a standpoint.  Activity and diet as tolerated.  Follow-up as needed.    Basia Pichardo MD

## 2024-11-08 ENCOUNTER — PATIENT OUTREACH (OUTPATIENT)
Dept: PRIMARY CARE | Facility: CLINIC | Age: 29
End: 2024-11-08

## 2024-11-08 ENCOUNTER — APPOINTMENT (OUTPATIENT)
Dept: SURGERY | Facility: CLINIC | Age: 29
End: 2024-11-08
Payer: COMMERCIAL

## 2024-11-08 VITALS
HEART RATE: 100 BPM | WEIGHT: 189 LBS | OXYGEN SATURATION: 96 % | BODY MASS INDEX: 30.37 KG/M2 | DIASTOLIC BLOOD PRESSURE: 77 MMHG | HEIGHT: 66 IN | SYSTOLIC BLOOD PRESSURE: 114 MMHG

## 2024-11-08 DIAGNOSIS — K35.80 ACUTE APPENDICITIS, UNSPECIFIED ACUTE APPENDICITIS TYPE: Primary | ICD-10-CM

## 2024-11-08 PROCEDURE — 99024 POSTOP FOLLOW-UP VISIT: CPT | Performed by: SURGERY

## 2024-11-08 PROCEDURE — 3008F BODY MASS INDEX DOCD: CPT | Performed by: SURGERY

## 2024-12-06 ENCOUNTER — PATIENT OUTREACH (OUTPATIENT)
Dept: PRIMARY CARE | Facility: CLINIC | Age: 29
End: 2024-12-06
Payer: COMMERCIAL

## 2024-12-16 DIAGNOSIS — Z30.9 ENCOUNTER FOR CONTRACEPTIVE MANAGEMENT, UNSPECIFIED TYPE: ICD-10-CM

## 2024-12-17 RX ORDER — MEDROXYPROGESTERONE ACETATE 150 MG/ML
150 INJECTION, SUSPENSION INTRAMUSCULAR
Qty: 1 ML | Refills: 3 | Status: SHIPPED | OUTPATIENT
Start: 2024-12-17

## 2024-12-18 ENCOUNTER — APPOINTMENT (OUTPATIENT)
Dept: PRIMARY CARE | Facility: CLINIC | Age: 29
End: 2024-12-18
Payer: COMMERCIAL

## 2024-12-18 DIAGNOSIS — Z30.9 ENCOUNTER FOR CONTRACEPTIVE MANAGEMENT, UNSPECIFIED TYPE: ICD-10-CM

## 2024-12-18 RX ORDER — MEDROXYPROGESTERONE ACETATE 150 MG/ML
150 INJECTION, SUSPENSION INTRAMUSCULAR ONCE
Status: DISCONTINUED | OUTPATIENT
Start: 2024-12-18 | End: 2024-12-18

## 2024-12-18 RX ORDER — MEDROXYPROGESTERONE ACETATE 150 MG/ML
150 INJECTION, SUSPENSION INTRAMUSCULAR ONCE
Status: COMPLETED | OUTPATIENT
Start: 2024-12-18 | End: 2024-12-19

## 2024-12-19 ENCOUNTER — CLINICAL SUPPORT (OUTPATIENT)
Dept: PRIMARY CARE | Facility: CLINIC | Age: 29
End: 2024-12-19
Payer: COMMERCIAL

## 2024-12-19 DIAGNOSIS — Z30.9 ENCOUNTER FOR CONTRACEPTIVE MANAGEMENT, UNSPECIFIED TYPE: ICD-10-CM

## 2024-12-19 PROCEDURE — 96372 THER/PROPH/DIAG INJ SC/IM: CPT | Performed by: FAMILY MEDICINE

## 2025-01-06 ENCOUNTER — PATIENT OUTREACH (OUTPATIENT)
Dept: PRIMARY CARE | Facility: CLINIC | Age: 30
End: 2025-01-06

## 2025-01-06 ENCOUNTER — APPOINTMENT (OUTPATIENT)
Dept: PRIMARY CARE | Facility: CLINIC | Age: 30
End: 2025-01-06
Payer: COMMERCIAL

## 2025-01-09 ENCOUNTER — OFFICE VISIT (OUTPATIENT)
Dept: PRIMARY CARE | Facility: CLINIC | Age: 30
End: 2025-01-09
Payer: COMMERCIAL

## 2025-01-09 ENCOUNTER — APPOINTMENT (OUTPATIENT)
Dept: PRIMARY CARE | Facility: CLINIC | Age: 30
End: 2025-01-09
Payer: COMMERCIAL

## 2025-01-09 VITALS
SYSTOLIC BLOOD PRESSURE: 121 MMHG | HEART RATE: 80 BPM | OXYGEN SATURATION: 98 % | DIASTOLIC BLOOD PRESSURE: 80 MMHG | TEMPERATURE: 97.6 F

## 2025-01-09 DIAGNOSIS — S46.011A STRAIN OF RIGHT ROTATOR CUFF CAPSULE, INITIAL ENCOUNTER: Primary | ICD-10-CM

## 2025-01-09 PROCEDURE — 1036F TOBACCO NON-USER: CPT | Performed by: FAMILY MEDICINE

## 2025-01-09 PROCEDURE — 99213 OFFICE O/P EST LOW 20 MIN: CPT | Performed by: FAMILY MEDICINE

## 2025-01-09 RX ORDER — NAPROXEN 500 MG/1
500 TABLET ORAL 2 TIMES DAILY PRN
Qty: 60 TABLET | Refills: 0 | Status: SHIPPED | OUTPATIENT
Start: 2025-01-09

## 2025-01-09 ASSESSMENT — ENCOUNTER SYMPTOMS: ARTHRALGIAS: 1

## 2025-01-09 NOTE — PROGRESS NOTES
Subjective   Patient ID: Rosalva Pineda is a 29 y.o. female who presents for No chief complaint on file..    Shoulder Pain   This is a new problem. The current episode started in the past 7 days.      She injured her right shoulder 4 days ago.  She was carrying groceries and slipped and caught her elbow on a fence and twisted her shoulder.  Since then she has had pain in her shoulder.  This is greatest near the shoulder blade.  This is a 7 out of 10 intensity pain which is constant  Taking ibuprofen as needed  No neck pain.  No numbness or weakness  Currently breast-feeding        Review of Systems   Musculoskeletal:  Positive for arthralgias.       Objective   /80   Pulse 80   Temp 36.4 °C (97.6 °F) (Temporal)   SpO2 98%     Physical Exam  Constitutional:       General: She is not in acute distress.     Appearance: Normal appearance. She is well-developed.   Pulmonary:      Effort: Pulmonary effort is normal.   Musculoskeletal:         General: Tenderness present.      Comments: Right shoulder: There is tenderness posterior aspect of shoulder near the upper scapula.  She has pain with full active shoulder abduction.  Has pain with shoulder abduction, external and internal rotation against resistance.  Shoulder strength is plus 5 out of 5 with abduction and internal/external rotation   Skin:     Findings: No rash.   Neurological:      Mental Status: She is alert.   Psychiatric:         Mood and Affect: Mood normal.         Behavior: Behavior normal.         Assessment/Plan   Assessment & Plan  Strain of right rotator cuff capsule, initial encounter    Orders:    naproxen (Naprosyn) 500 mg tablet; Take 1 tablet (500 mg) by mouth 2 times a day as needed (pain).    She presents today with right shoulder pain following injury 4 days ago.  Exam today is most consistent with a rotator cuff strain.  Since she did not directly hit her shoulder and there is no significant bony tenderness on exam today, I do not feel  that she needs an x-ray right now, but we can consider this if shoulder pain does not get better in the next week  I considered starting meloxicam, however since she is still breast-feeding, we will treat with naproxen 500 mg twice daily instead  Recommended ice/heat as needed.  Can use a shoulder sling as needed for comfort  She will let me know if not getting better in the next week and we can consider an x-ray at that time

## 2025-01-16 ENCOUNTER — PREP FOR PROCEDURE (OUTPATIENT)
Dept: OBSTETRICS AND GYNECOLOGY | Facility: CLINIC | Age: 30
End: 2025-01-16

## 2025-01-16 ENCOUNTER — APPOINTMENT (OUTPATIENT)
Dept: OBSTETRICS AND GYNECOLOGY | Facility: CLINIC | Age: 30
End: 2025-01-16
Payer: COMMERCIAL

## 2025-01-16 DIAGNOSIS — O34.22: ICD-10-CM

## 2025-01-16 DIAGNOSIS — N93.9 ABNORMAL UTERINE BLEEDING (AUB): Primary | ICD-10-CM

## 2025-01-16 RX ORDER — GABAPENTIN 600 MG/1
600 TABLET ORAL ONCE
OUTPATIENT
Start: 2025-01-16 | End: 2025-01-16

## 2025-01-16 RX ORDER — ACETAMINOPHEN 325 MG/1
975 TABLET ORAL ONCE
OUTPATIENT
Start: 2025-01-16 | End: 2025-01-16

## 2025-01-16 RX ORDER — TRANEXAMIC ACID 650 MG/1
1300 TABLET ORAL ONCE
OUTPATIENT
Start: 2025-01-16 | End: 2025-01-16

## 2025-01-16 RX ORDER — CELECOXIB 400 MG/1
400 CAPSULE ORAL ONCE
OUTPATIENT
Start: 2025-01-16 | End: 2025-01-16

## 2025-01-16 RX ORDER — CEFAZOLIN SODIUM 2 G/100ML
2 INJECTION, SOLUTION INTRAVENOUS ONCE
OUTPATIENT
Start: 2025-01-16 | End: 2025-01-16

## 2025-01-16 NOTE — PROGRESS NOTES
GYN PROGRESS NOTE    Virtual or Telephone Consent    A telephone visit (audio only) between the patient (at the originating site) and the provider (at the distant site) was utilized to provide this telehealth service.   Verbal consent was requested and obtained from Rosalva Pineda on this date, 25 for a telehealth visit.   11 minute visit    Chief complaint: follow up    HPI:  Patient answers are not available for this visit.  - Patient is overall doing well and would like to discuss a hysterectomy.  - She would like to proceed with a hysterectomy.  - Noted that she has occasional urinary leakage associated with cough, laugh, and sneeze.      Reviewed case with patient, reviewed plans.    Discussed a hysterectomy vs  laparoscopic repair of anterior uterine defect.     Reviewed risks, recovery, and post operative restrictions with patient associated a laparoscopic hysterectomy.    HISTORY:  Past Medical History:   Diagnosis Date    Anxiety     Depression     Elevated blood pressure reading     Postoperative wound infection 10/26/24     Past Surgical History:   Procedure Laterality Date    APPENDECTOMY  10/24/2024     SECTION, LOW TRANSVERSE N/A 10/19/2023    PELVIC LAPAROSCOPY  2024    IUD ressection after abnormal migration    TONSILECTOMY, ADENOIDECTOMY, BILATERAL MYRINGOTOMY AND TUBES       Social History     Socioeconomic History    Marital status: Significant Other     Spouse name: Jem Rodriguez    Number of children: Not on file    Years of education: Not on file    Highest education level: Not on file   Occupational History    Occupation: Doctor's    Tobacco Use    Smoking status: Never    Smokeless tobacco: Never   Vaping Use    Vaping status: Unknown   Substance and Sexual Activity    Alcohol use: Not Currently    Drug use: Never    Sexual activity: Yes     Partners: Male     Birth control/protection: Other, None     Comment: Depo injection   Other Topics Concern    Not on  file   Social History Narrative    Not on file     Social Drivers of Health     Financial Resource Strain: Low Risk  (10/24/2024)    Overall Financial Resource Strain (CARDIA)     Difficulty of Paying Living Expenses: Not very hard   Food Insecurity: No Food Insecurity (10/24/2024)    Hunger Vital Sign     Worried About Running Out of Food in the Last Year: Never true     Ran Out of Food in the Last Year: Never true   Transportation Needs: No Transportation Needs (10/24/2024)    PRAPARE - Transportation     Lack of Transportation (Medical): No     Lack of Transportation (Non-Medical): No   Physical Activity: Sufficiently Active (10/18/2023)    Exercise Vital Sign     Days of Exercise per Week: 5 days     Minutes of Exercise per Session: 30 min   Stress: No Stress Concern Present (10/24/2024)    [employer] - Occupational Stress Questionnaire     Feeling of Stress : Not at all   Social Connections: Moderately Isolated (10/18/2023)    Social Connection and Isolation Panel [NHANES]     Frequency of Communication with Friends and Family: More than three times a week     Frequency of Social Gatherings with Friends and Family: More than three times a week     Attends Anabaptism Services: Never     Active Member of Clubs or Organizations: No     Attends Club or Organization Meetings: Never     Marital Status: Living with partner   Intimate Partner Violence: Not At Risk (10/24/2024)    Humiliation, Afraid, Rape, and Kick questionnaire     Fear of Current or Ex-Partner: No     Emotionally Abused: No     Physically Abused: No     Sexually Abused: No   Housing Stability: Low Risk  (10/24/2024)    Housing Stability Vital Sign     Unable to Pay for Housing in the Last Year: No     Number of Times Moved in the Last Year: 0     Homeless in the Last Year: No     Cancer-related family history is not on file.       IMPRESSION/PLAN:  29 y.o. abnormal uterine bleeding.    - Laparoscopic total hysterectomy  bilateral salpingectomy cystoscopy    Risks benefits alternatives discussed with the patient risks including bleeding infection or damage to surrounding tissues.  Bleeding requires a blood transfusion reaction or infection.  Infection of the superficial or deep spaces.  Damage to bladder bowel ureters vascular structures abdominal wall.  Temporary or severe complications are possible. Requiring further surgery acutely or with prolonged hospitalization including a return to the operating room.  All questions answered to the best of my ability.    Follow up as scheduled    Paolo TYLER am scribing for virtually, and in the presence of Dr. Imer Merino on  01/16/25 at 6:10 PM     Imer Merino MD

## 2025-01-27 ENCOUNTER — TELEPHONE (OUTPATIENT)
Dept: OBSTETRICS AND GYNECOLOGY | Facility: CLINIC | Age: 30
End: 2025-01-27
Payer: COMMERCIAL

## 2025-01-27 NOTE — TELEPHONE ENCOUNTER
Patient called for an update on her surgery. She hasn't heard anything from her virtual on 1/16/25 with Dr. Merino. Needs time for work and to be scheduled, ok to leave a voicemail if she does not answer. Thanks.

## 2025-01-28 PROBLEM — N93.9 ABNORMAL UTERINE BLEEDING (AUB): Status: ACTIVE | Noted: 2025-01-16

## 2025-01-28 PROBLEM — O34.22: Status: ACTIVE | Noted: 2025-01-16

## 2025-02-14 ENCOUNTER — OFFICE VISIT (OUTPATIENT)
Dept: OBSTETRICS AND GYNECOLOGY | Facility: CLINIC | Age: 30
End: 2025-02-14
Payer: COMMERCIAL

## 2025-02-14 ENCOUNTER — PREP FOR PROCEDURE (OUTPATIENT)
Dept: OBSTETRICS AND GYNECOLOGY | Facility: CLINIC | Age: 30
End: 2025-02-14

## 2025-02-14 VITALS — SYSTOLIC BLOOD PRESSURE: 110 MMHG | DIASTOLIC BLOOD PRESSURE: 76 MMHG

## 2025-02-14 DIAGNOSIS — N93.9 ABNORMAL UTERINE BLEEDING (AUB): Primary | ICD-10-CM

## 2025-02-14 DIAGNOSIS — N39.3 SUI (STRESS URINARY INCONTINENCE, FEMALE): ICD-10-CM

## 2025-02-14 PROCEDURE — 99215 OFFICE O/P EST HI 40 MIN: CPT | Performed by: OBSTETRICS & GYNECOLOGY

## 2025-02-14 PROCEDURE — 1036F TOBACCO NON-USER: CPT | Performed by: OBSTETRICS & GYNECOLOGY

## 2025-02-14 ASSESSMENT — PATIENT HEALTH QUESTIONNAIRE - PHQ9
2. FEELING DOWN, DEPRESSED OR HOPELESS: NOT AT ALL
1. LITTLE INTEREST OR PLEASURE IN DOING THINGS: NOT AT ALL
SUM OF ALL RESPONSES TO PHQ9 QUESTIONS 1 & 2: 0

## 2025-02-14 NOTE — PROGRESS NOTES
GYN PROGRESS NOTE          Chief complaint: follow up    HPI:  Patient answers are not available for this visit.  HPI    Rosalva is 30 yo Est patient here today to discuss her upcoming hysterectomy scheduled for 3/25/25  Last edited by Annie Lala RN on 2025  8:54 AM.          Reviewed case with patient, reviewed plans.    Answered all questions discuss options for management of stress urinary incontinence    ROS:  GEN - no fevers or chills  RESP - no SOB or cough  GYN - see HPI      HISTORY:  Past Medical History:   Diagnosis Date    Anxiety     Depression     Elevated blood pressure reading     Postoperative wound infection 10/26/24     Past Surgical History:   Procedure Laterality Date    APPENDECTOMY  10/24/2024     SECTION, LOW TRANSVERSE N/A 10/19/2023    PELVIC LAPAROSCOPY  2024    IUD ressection after abnormal migration    TONSILECTOMY, ADENOIDECTOMY, BILATERAL MYRINGOTOMY AND TUBES       Social History     Socioeconomic History    Marital status: Significant Other     Spouse name: Jem Rodriguez    Number of children: Not on file    Years of education: Not on file    Highest education level: Not on file   Occupational History    Occupation: Doctor's    Tobacco Use    Smoking status: Never    Smokeless tobacco: Never   Vaping Use    Vaping status: Unknown   Substance and Sexual Activity    Alcohol use: Not Currently    Drug use: Never    Sexual activity: Yes     Partners: Male     Birth control/protection: Other, None     Comment: Depo injection   Other Topics Concern    Not on file   Social History Narrative    Not on file     Social Drivers of Health     Financial Resource Strain: Low Risk  (10/24/2024)    Overall Financial Resource Strain (CARDIA)     Difficulty of Paying Living Expenses: Not very hard   Food Insecurity: No Food Insecurity (10/24/2024)    Hunger Vital Sign     Worried About Running Out of Food in the Last Year: Never true     Ran Out of Food in the Last  Year: Never true   Transportation Needs: No Transportation Needs (10/24/2024)    PRAPARE - Transportation     Lack of Transportation (Medical): No     Lack of Transportation (Non-Medical): No   Physical Activity: Sufficiently Active (10/18/2023)    Exercise Vital Sign     Days of Exercise per Week: 5 days     Minutes of Exercise per Session: 30 min   Stress: No Stress Concern Present (10/24/2024)    [employer] [employer] - Occupational Stress Questionnaire     Feeling of Stress : Not at all   Social Connections: Moderately Isolated (10/18/2023)    Social Connection and Isolation Panel [NHANES]     Frequency of Communication with Friends and Family: More than three times a week     Frequency of Social Gatherings with Friends and Family: More than three times a week     Attends Buddhist Services: Never     Active Member of Clubs or Organizations: No     Attends Club or Organization Meetings: Never     Marital Status: Living with partner   Intimate Partner Violence: Not At Risk (10/24/2024)    Humiliation, Afraid, Rape, and Kick questionnaire     Fear of Current or Ex-Partner: No     Emotionally Abused: No     Physically Abused: No     Sexually Abused: No   Housing Stability: Low Risk  (10/24/2024)    Housing Stability Vital Sign     Unable to Pay for Housing in the Last Year: No     Number of Times Moved in the Last Year: 0     Homeless in the Last Year: No     Cancer-related family history is not on file.       PHYSICAL EXAM:  /76   Physical examination:  General: No distress  Neck: No masses  Respiratory: No respiratory distress  Abdomen: soft nontender no hernias  GYN: Normal vulvar skin normal clitoral [place] and clitoris labia majora and minora normal vaginal mucosa hypermobile urethra positive cough stress test empty bladder by palpation perianal area: without lesions        IMPRESSION/PLAN:    29-year-old abnormal uterine bleeding ovulatory and  section defect, stress urinary  incontinence  Laparoscopic hysterectomy bilateral salpingectomy suburethral sling cystoscopy    Permanent mesh material discussed vaginal erosion or erosion into surrounding tissue urinary retention with need for further surgery or prolonged catheterization. Permanent mesh material discussed vaginal erosion or erosion into surrounding tissue urinary retention with need for further surgery or prolonged catheterization risks benefits alternatives discussed with the patient risks including bleeding infection or damage to surrounding tissues.  Bleeding requiring blood transfusion transfusion reaction or infection.  Infection of the superficial or deep spaces.  Damage to bladder bowel ureters vascular structures abdominal wall.  Requiring further surgery acutely or prolonged hospitalization or returned to the operating room.  All questions answered to the best my ability.        Imer Merino MD

## 2025-02-21 ENCOUNTER — APPOINTMENT (OUTPATIENT)
Dept: PRIMARY CARE | Facility: CLINIC | Age: 30
End: 2025-02-21
Payer: COMMERCIAL

## 2025-02-21 VITALS
SYSTOLIC BLOOD PRESSURE: 107 MMHG | RESPIRATION RATE: 16 BRPM | BODY MASS INDEX: 30.53 KG/M2 | HEIGHT: 66 IN | TEMPERATURE: 98.6 F | HEART RATE: 94 BPM | DIASTOLIC BLOOD PRESSURE: 76 MMHG | WEIGHT: 190 LBS

## 2025-02-21 DIAGNOSIS — Z30.9 ENCOUNTER FOR CONTRACEPTIVE MANAGEMENT, UNSPECIFIED TYPE: ICD-10-CM

## 2025-02-21 DIAGNOSIS — Z01.818 PREOP EXAMINATION: ICD-10-CM

## 2025-02-21 DIAGNOSIS — N93.9 ABNORMAL UTERINE BLEEDING (AUB): ICD-10-CM

## 2025-02-21 DIAGNOSIS — Z13.220 LIPID SCREENING: ICD-10-CM

## 2025-02-21 DIAGNOSIS — Z00.00 WELL ADULT EXAM: Primary | ICD-10-CM

## 2025-02-21 PROCEDURE — 3008F BODY MASS INDEX DOCD: CPT | Performed by: FAMILY MEDICINE

## 2025-02-21 PROCEDURE — 1036F TOBACCO NON-USER: CPT | Performed by: FAMILY MEDICINE

## 2025-02-21 PROCEDURE — 99395 PREV VISIT EST AGE 18-39: CPT | Performed by: FAMILY MEDICINE

## 2025-02-21 RX ORDER — MEDROXYPROGESTERONE ACETATE 150 MG/ML
150 INJECTION, SUSPENSION INTRAMUSCULAR
Qty: 1 ML | Refills: 3 | Status: SHIPPED | OUTPATIENT
Start: 2025-02-21

## 2025-02-21 ASSESSMENT — PATIENT HEALTH QUESTIONNAIRE - PHQ9
2. FEELING DOWN, DEPRESSED OR HOPELESS: NOT AT ALL
SUM OF ALL RESPONSES TO PHQ9 QUESTIONS 1 AND 2: 0
1. LITTLE INTEREST OR PLEASURE IN DOING THINGS: NOT AT ALL

## 2025-02-21 NOTE — PROGRESS NOTES
Subjective   Rosalva Pineda is a 29 y.o. female who presents for a wellness visit.  HPI    Review of Systems  Hearing/Vision screen:No results found.   Visit Vitals  /76   Pulse 94   Temp 37 °C (98.6 °F)   Resp 16      Objective   Physical Exam  Constitutional:       Appearance: Normal appearance.   HENT:      Head: Normocephalic.      Right Ear: Tympanic membrane, ear canal and external ear normal.      Left Ear: Tympanic membrane, ear canal and external ear normal.      Nose: Nose normal.      Mouth/Throat:      Mouth: Mucous membranes are moist.      Pharynx: Oropharynx is clear.   Eyes:      Conjunctiva/sclera: Conjunctivae normal.   Cardiovascular:      Rate and Rhythm: Normal rate and regular rhythm.   Pulmonary:      Effort: Pulmonary effort is normal.      Breath sounds: Normal breath sounds.   Musculoskeletal:         General: Normal range of motion.      Cervical back: Neck supple.   Skin:     General: Skin is warm and dry.   Neurological:      General: No focal deficit present.      Mental Status: She is alert and oriented to person, place, and time.   Psychiatric:         Mood and Affect: Mood normal.         Assessment & Plan  Well adult exam  This 29-year-old female is in good health.  She is scheduled for an elective procedure and has preoperative lab work already ordered.  Because of the family history of diabetes we will add a an A1c screen and also add a fasting lipid panel.  There is also family history of thyroid disease so we will add a TSH.  She is not due for any other major screening tests and since she is having hysterectomy will not need Pap screening.  She is up-to-date on vaccinations but I do recommend continuing COVID and influenza vaccine in the fall.  Orders:    Thyroid Stimulating Hormone; Future    Hemoglobin A1C; Future    Follow Up In [facility] - PCP; Future    Preop examination  After history and physical examination she is at no increased risk for perioperative  complications.  Her preop lab testing is pending and will be reviewed when it comes in.       Encounter for contraceptive management, unspecified type    Orders:    medroxyPROGESTERone (Depo-Provera) 150 mg/mL injection; Inject 1 mL (150 mg) into the muscle every 12 weeks.    Lipid screening    Orders:    Lipid Panel; Future    Abnormal uterine bleeding (AUB)  She is scheduled to have a total abdominal laparoscopic hysterectomy and salpingectomy next month.              Please excuse any errors in grammar or translation related to this dictation. Voice recognition software was utilized to prepare this document.

## 2025-02-21 NOTE — ASSESSMENT & PLAN NOTE
She is scheduled to have a total abdominal laparoscopic hysterectomy and salpingectomy next month.

## 2025-03-10 NOTE — PREPROCEDURE INSTRUCTIONS

## 2025-03-12 ENCOUNTER — CLINICAL SUPPORT (OUTPATIENT)
Dept: PRIMARY CARE | Facility: CLINIC | Age: 30
End: 2025-03-12
Payer: COMMERCIAL

## 2025-03-12 DIAGNOSIS — Z30.9 ENCOUNTER FOR CONTRACEPTIVE MANAGEMENT, UNSPECIFIED TYPE: ICD-10-CM

## 2025-03-12 PROCEDURE — 96372 THER/PROPH/DIAG INJ SC/IM: CPT | Performed by: FAMILY MEDICINE

## 2025-03-12 RX ORDER — MEDROXYPROGESTERONE ACETATE 150 MG/ML
150 INJECTION, SUSPENSION INTRAMUSCULAR ONCE
Status: COMPLETED | OUTPATIENT
Start: 2025-03-12 | End: 2025-03-12

## 2025-03-12 RX ADMIN — MEDROXYPROGESTERONE ACETATE 150 MG: 150 INJECTION, SUSPENSION INTRAMUSCULAR at 09:48

## 2025-03-22 ENCOUNTER — LAB (OUTPATIENT)
Dept: LAB | Facility: HOSPITAL | Age: 30
End: 2025-03-22
Payer: COMMERCIAL

## 2025-03-22 DIAGNOSIS — N93.9 ABNORMAL UTERINE BLEEDING (AUB): ICD-10-CM

## 2025-03-22 DIAGNOSIS — O34.22: ICD-10-CM

## 2025-03-22 LAB
ABO GROUP (TYPE) IN BLOOD: NORMAL
ANTIBODY SCREEN: NORMAL
RH FACTOR (ANTIGEN D): NORMAL

## 2025-03-22 PROCEDURE — 86901 BLOOD TYPING SEROLOGIC RH(D): CPT

## 2025-03-22 PROCEDURE — 86900 BLOOD TYPING SEROLOGIC ABO: CPT

## 2025-03-22 PROCEDURE — 86850 RBC ANTIBODY SCREEN: CPT

## 2025-03-23 LAB
CHOLEST SERPL-MCNC: 140 MG/DL
CHOLEST/HDLC SERPL: 2.9 (CALC)
EST. AVERAGE GLUCOSE BLD GHB EST-MCNC: 111 MG/DL
EST. AVERAGE GLUCOSE BLD GHB EST-SCNC: 6.2 MMOL/L
HBA1C MFR BLD: 5.5 % OF TOTAL HGB
HDLC SERPL-MCNC: 48 MG/DL
LDLC SERPL CALC-MCNC: 81 MG/DL (CALC)
NONHDLC SERPL-MCNC: 92 MG/DL (CALC)
TRIGL SERPL-MCNC: 40 MG/DL
TSH SERPL-ACNC: 1.18 MIU/L

## 2025-03-24 ENCOUNTER — LAB (OUTPATIENT)
Dept: LAB | Facility: HOSPITAL | Age: 30
End: 2025-03-24
Payer: COMMERCIAL

## 2025-03-24 DIAGNOSIS — N93.9 ABNORMAL UTERINE BLEEDING (AUB): ICD-10-CM

## 2025-03-24 DIAGNOSIS — O34.22: ICD-10-CM

## 2025-03-24 LAB
ERYTHROCYTE [DISTWIDTH] IN BLOOD BY AUTOMATED COUNT: NORMAL %
HCT VFR BLD AUTO: NORMAL %
HGB BLD-MCNC: NORMAL G/DL
MCH RBC QN AUTO: NORMAL PG
MCHC RBC AUTO-ENTMCNC: NORMAL G/DL
MCV RBC AUTO: NORMAL FL
NRBC BLD-RTO: NORMAL /100{WBCS}
PLATELET # BLD AUTO: NORMAL 10*3/UL
RBC # BLD AUTO: NORMAL 10*6/UL
WBC # BLD AUTO: NORMAL 10*3/UL

## 2025-03-24 PROCEDURE — 85027 COMPLETE CBC AUTOMATED: CPT

## 2025-03-25 ENCOUNTER — ANESTHESIA EVENT (OUTPATIENT)
Dept: OPERATING ROOM | Facility: HOSPITAL | Age: 30
End: 2025-03-25
Payer: COMMERCIAL

## 2025-03-25 ENCOUNTER — HOSPITAL ENCOUNTER (OUTPATIENT)
Facility: HOSPITAL | Age: 30
Setting detail: OUTPATIENT SURGERY
Discharge: HOME | End: 2025-03-25
Attending: OBSTETRICS & GYNECOLOGY | Admitting: OBSTETRICS & GYNECOLOGY
Payer: COMMERCIAL

## 2025-03-25 ENCOUNTER — ANESTHESIA (OUTPATIENT)
Dept: OPERATING ROOM | Facility: HOSPITAL | Age: 30
End: 2025-03-25
Payer: COMMERCIAL

## 2025-03-25 VITALS
WEIGHT: 183.2 LBS | DIASTOLIC BLOOD PRESSURE: 64 MMHG | OXYGEN SATURATION: 98 % | HEART RATE: 95 BPM | HEIGHT: 65 IN | BODY MASS INDEX: 30.52 KG/M2 | RESPIRATION RATE: 16 BRPM | SYSTOLIC BLOOD PRESSURE: 123 MMHG | TEMPERATURE: 98.2 F

## 2025-03-25 DIAGNOSIS — O34.22: ICD-10-CM

## 2025-03-25 DIAGNOSIS — Z41.9 SURGERY, ELECTIVE: ICD-10-CM

## 2025-03-25 DIAGNOSIS — G89.18 POSTOPERATIVE PAIN: Primary | ICD-10-CM

## 2025-03-25 DIAGNOSIS — N93.9 ABNORMAL UTERINE BLEEDING (AUB): ICD-10-CM

## 2025-03-25 LAB
ABO GROUP (TYPE) IN BLOOD: NORMAL
ANION GAP SERPL CALC-SCNC: 12 MMOL/L (ref 10–20)
BUN SERPL-MCNC: 14 MG/DL (ref 6–23)
CALCIUM SERPL-MCNC: 9 MG/DL (ref 8.6–10.3)
CHLORIDE SERPL-SCNC: 106 MMOL/L (ref 98–107)
CO2 SERPL-SCNC: 24 MMOL/L (ref 21–32)
CREAT SERPL-MCNC: 0.56 MG/DL (ref 0.5–1.05)
EGFRCR SERPLBLD CKD-EPI 2021: >90 ML/MIN/1.73M*2
ERYTHROCYTE [DISTWIDTH] IN BLOOD BY AUTOMATED COUNT: 13.5 % (ref 11.5–14.5)
GLUCOSE SERPL-MCNC: 78 MG/DL (ref 74–99)
HCT VFR BLD AUTO: 40.3 % (ref 36–46)
HGB BLD-MCNC: 13.6 G/DL (ref 12–16)
MCH RBC QN AUTO: 29.4 PG (ref 26–34)
MCHC RBC AUTO-ENTMCNC: 33.7 G/DL (ref 32–36)
MCV RBC AUTO: 87 FL (ref 80–100)
NRBC BLD-RTO: 0 /100 WBCS (ref 0–0)
PLATELET # BLD AUTO: 327 X10*3/UL (ref 150–450)
POTASSIUM SERPL-SCNC: 4.1 MMOL/L (ref 3.5–5.3)
PREGNANCY TEST URINE, POC: NEGATIVE
RBC # BLD AUTO: 4.63 X10*6/UL (ref 4–5.2)
RH FACTOR (ANTIGEN D): NORMAL
SODIUM SERPL-SCNC: 138 MMOL/L (ref 136–145)
WBC # BLD AUTO: 9 X10*3/UL (ref 4.4–11.3)

## 2025-03-25 PROCEDURE — 88307 TISSUE EXAM BY PATHOLOGIST: CPT | Mod: TC,ELYLAB,WESLAB | Performed by: OBSTETRICS & GYNECOLOGY

## 2025-03-25 PROCEDURE — 85027 COMPLETE CBC AUTOMATED: CPT | Performed by: OBSTETRICS & GYNECOLOGY

## 2025-03-25 PROCEDURE — 2720000006: Performed by: OBSTETRICS & GYNECOLOGY

## 2025-03-25 PROCEDURE — 80048 BASIC METABOLIC PNL TOTAL CA: CPT | Performed by: OBSTETRICS & GYNECOLOGY

## 2025-03-25 PROCEDURE — 3600000004 HC OR TIME - INITIAL BASE CHARGE - PROCEDURE LEVEL FOUR: Performed by: OBSTETRICS & GYNECOLOGY

## 2025-03-25 PROCEDURE — 3700000002 HC GENERAL ANESTHESIA TIME - EACH INCREMENTAL 1 MINUTE: Performed by: OBSTETRICS & GYNECOLOGY

## 2025-03-25 PROCEDURE — 51702 INSERT TEMP BLADDER CATH: CPT

## 2025-03-25 PROCEDURE — 3600000009 HC OR TIME - EACH INCREMENTAL 1 MINUTE - PROCEDURE LEVEL FOUR: Performed by: OBSTETRICS & GYNECOLOGY

## 2025-03-25 PROCEDURE — 2500000004 HC RX 250 GENERAL PHARMACY W/ HCPCS (ALT 636 FOR OP/ED): Performed by: OBSTETRICS & GYNECOLOGY

## 2025-03-25 PROCEDURE — 2720000007: Performed by: OBSTETRICS & GYNECOLOGY

## 2025-03-25 PROCEDURE — 2500000001 HC RX 250 WO HCPCS SELF ADMINISTERED DRUGS (ALT 637 FOR MEDICARE OP): Performed by: STUDENT IN AN ORGANIZED HEALTH CARE EDUCATION/TRAINING PROGRAM

## 2025-03-25 PROCEDURE — C1771 REP DEV, URINARY, W/SLING: HCPCS | Performed by: OBSTETRICS & GYNECOLOGY

## 2025-03-25 PROCEDURE — 58571 TLH W/T/O 250 G OR LESS: CPT | Performed by: OBSTETRICS & GYNECOLOGY

## 2025-03-25 PROCEDURE — 36415 COLL VENOUS BLD VENIPUNCTURE: CPT | Performed by: OBSTETRICS & GYNECOLOGY

## 2025-03-25 PROCEDURE — 2780000003 HC OR 278 NO HCPCS: Performed by: OBSTETRICS & GYNECOLOGY

## 2025-03-25 PROCEDURE — 81025 URINE PREGNANCY TEST: CPT | Performed by: OBSTETRICS & GYNECOLOGY

## 2025-03-25 PROCEDURE — 57288 REPAIR BLADDER DEFECT: CPT | Performed by: OBSTETRICS & GYNECOLOGY

## 2025-03-25 PROCEDURE — 2500000005 HC RX 250 GENERAL PHARMACY W/O HCPCS: Performed by: OBSTETRICS & GYNECOLOGY

## 2025-03-25 PROCEDURE — 2500000001 HC RX 250 WO HCPCS SELF ADMINISTERED DRUGS (ALT 637 FOR MEDICARE OP): Performed by: OBSTETRICS & GYNECOLOGY

## 2025-03-25 PROCEDURE — 2500000004 HC RX 250 GENERAL PHARMACY W/ HCPCS (ALT 636 FOR OP/ED): Performed by: NURSE ANESTHETIST, CERTIFIED REGISTERED

## 2025-03-25 PROCEDURE — 2500000002 HC RX 250 W HCPCS SELF ADMINISTERED DRUGS (ALT 637 FOR MEDICARE OP, ALT 636 FOR OP/ED): Performed by: OBSTETRICS & GYNECOLOGY

## 2025-03-25 PROCEDURE — 2500000004 HC RX 250 GENERAL PHARMACY W/ HCPCS (ALT 636 FOR OP/ED): Mod: JZ | Performed by: STUDENT IN AN ORGANIZED HEALTH CARE EDUCATION/TRAINING PROGRAM

## 2025-03-25 PROCEDURE — 7100000010 HC PHASE TWO TIME - EACH INCREMENTAL 1 MINUTE: Performed by: OBSTETRICS & GYNECOLOGY

## 2025-03-25 PROCEDURE — 2720000007 HC OR 272 NO HCPCS: Performed by: OBSTETRICS & GYNECOLOGY

## 2025-03-25 PROCEDURE — 7100000009 HC PHASE TWO TIME - INITIAL BASE CHARGE: Performed by: OBSTETRICS & GYNECOLOGY

## 2025-03-25 PROCEDURE — 7100000002 HC RECOVERY ROOM TIME - EACH INCREMENTAL 1 MINUTE: Performed by: OBSTETRICS & GYNECOLOGY

## 2025-03-25 PROCEDURE — 3700000001 HC GENERAL ANESTHESIA TIME - INITIAL BASE CHARGE: Performed by: OBSTETRICS & GYNECOLOGY

## 2025-03-25 PROCEDURE — 88307 TISSUE EXAM BY PATHOLOGIST: CPT | Performed by: PATHOLOGY

## 2025-03-25 PROCEDURE — 7100000001 HC RECOVERY ROOM TIME - INITIAL BASE CHARGE: Performed by: OBSTETRICS & GYNECOLOGY

## 2025-03-25 DEVICE — SUPRAPUBIC MID-URETHRAL SLING
Type: IMPLANTABLE DEVICE | Site: ABDOMEN | Status: FUNCTIONAL
Brand: LYNX™ SYSTEM

## 2025-03-25 RX ORDER — CEFAZOLIN SODIUM 2 G/100ML
2 INJECTION, SOLUTION INTRAVENOUS ONCE
Status: COMPLETED | OUTPATIENT
Start: 2025-03-25 | End: 2025-03-25

## 2025-03-25 RX ORDER — LIDOCAINE HYDROCHLORIDE 20 MG/ML
INJECTION, SOLUTION INFILTRATION; PERINEURAL AS NEEDED
Status: DISCONTINUED | OUTPATIENT
Start: 2025-03-25 | End: 2025-03-25

## 2025-03-25 RX ORDER — CELECOXIB 200 MG/1
400 CAPSULE ORAL ONCE
Status: COMPLETED | OUTPATIENT
Start: 2025-03-25 | End: 2025-03-25

## 2025-03-25 RX ORDER — ROCURONIUM BROMIDE 10 MG/ML
INJECTION, SOLUTION INTRAVENOUS AS NEEDED
Status: DISCONTINUED | OUTPATIENT
Start: 2025-03-25 | End: 2025-03-25

## 2025-03-25 RX ORDER — MEPERIDINE HYDROCHLORIDE 25 MG/ML
12.5 INJECTION INTRAMUSCULAR; INTRAVENOUS; SUBCUTANEOUS EVERY 10 MIN PRN
Status: DISCONTINUED | OUTPATIENT
Start: 2025-03-25 | End: 2025-03-25 | Stop reason: HOSPADM

## 2025-03-25 RX ORDER — FENTANYL CITRATE 50 UG/ML
INJECTION, SOLUTION INTRAMUSCULAR; INTRAVENOUS AS NEEDED
Status: DISCONTINUED | OUTPATIENT
Start: 2025-03-25 | End: 2025-03-25

## 2025-03-25 RX ORDER — MIDAZOLAM HYDROCHLORIDE 1 MG/ML
INJECTION, SOLUTION INTRAMUSCULAR; INTRAVENOUS AS NEEDED
Status: DISCONTINUED | OUTPATIENT
Start: 2025-03-25 | End: 2025-03-25

## 2025-03-25 RX ORDER — NAPROXEN 500 MG/1
500 TABLET ORAL 2 TIMES DAILY
Qty: 6 TABLET | Refills: 0 | Status: SHIPPED | OUTPATIENT
Start: 2025-03-25 | End: 2025-03-28

## 2025-03-25 RX ORDER — DIPHENHYDRAMINE HYDROCHLORIDE 50 MG/ML
12.5 INJECTION, SOLUTION INTRAMUSCULAR; INTRAVENOUS ONCE AS NEEDED
Status: DISCONTINUED | OUTPATIENT
Start: 2025-03-25 | End: 2025-03-25 | Stop reason: HOSPADM

## 2025-03-25 RX ORDER — SODIUM CHLORIDE, SODIUM LACTATE, POTASSIUM CHLORIDE, CALCIUM CHLORIDE 600; 310; 30; 20 MG/100ML; MG/100ML; MG/100ML; MG/100ML
100 INJECTION, SOLUTION INTRAVENOUS CONTINUOUS
Status: DISCONTINUED | OUTPATIENT
Start: 2025-03-25 | End: 2025-03-25 | Stop reason: HOSPADM

## 2025-03-25 RX ORDER — PROPOFOL 10 MG/ML
INJECTION, EMULSION INTRAVENOUS AS NEEDED
Status: DISCONTINUED | OUTPATIENT
Start: 2025-03-25 | End: 2025-03-25

## 2025-03-25 RX ORDER — TRAMADOL HYDROCHLORIDE 50 MG/1
50 TABLET ORAL EVERY 6 HOURS PRN
Qty: 12 TABLET | Refills: 0 | Status: SHIPPED | OUTPATIENT
Start: 2025-03-25 | End: 2025-03-28

## 2025-03-25 RX ORDER — ONDANSETRON HYDROCHLORIDE 2 MG/ML
INJECTION, SOLUTION INTRAVENOUS AS NEEDED
Status: DISCONTINUED | OUTPATIENT
Start: 2025-03-25 | End: 2025-03-25

## 2025-03-25 RX ORDER — FENTANYL CITRATE 50 UG/ML
50 INJECTION, SOLUTION INTRAMUSCULAR; INTRAVENOUS EVERY 5 MIN PRN
Status: DISCONTINUED | OUTPATIENT
Start: 2025-03-25 | End: 2025-03-25 | Stop reason: HOSPADM

## 2025-03-25 RX ORDER — ACETAMINOPHEN 325 MG/1
975 TABLET ORAL ONCE
Status: COMPLETED | OUTPATIENT
Start: 2025-03-25 | End: 2025-03-25

## 2025-03-25 RX ORDER — SODIUM CHLORIDE, SODIUM LACTATE, POTASSIUM CHLORIDE, CALCIUM CHLORIDE 600; 310; 30; 20 MG/100ML; MG/100ML; MG/100ML; MG/100ML
50 INJECTION, SOLUTION INTRAVENOUS CONTINUOUS
Status: DISCONTINUED | OUTPATIENT
Start: 2025-03-25 | End: 2025-03-25 | Stop reason: HOSPADM

## 2025-03-25 RX ORDER — OXYCODONE HYDROCHLORIDE 5 MG/1
5 TABLET ORAL EVERY 4 HOURS PRN
Status: DISCONTINUED | OUTPATIENT
Start: 2025-03-25 | End: 2025-03-25 | Stop reason: HOSPADM

## 2025-03-25 RX ORDER — FAMOTIDINE 10 MG/ML
INJECTION INTRAVENOUS AS NEEDED
Status: DISCONTINUED | OUTPATIENT
Start: 2025-03-25 | End: 2025-03-25

## 2025-03-25 RX ORDER — ACETAMINOPHEN 500 MG
1000 TABLET ORAL EVERY 6 HOURS
Qty: 24 TABLET | Refills: 0 | Status: SHIPPED | OUTPATIENT
Start: 2025-03-25 | End: 2025-03-28

## 2025-03-25 RX ORDER — GABAPENTIN 300 MG/1
600 CAPSULE ORAL ONCE
Status: COMPLETED | OUTPATIENT
Start: 2025-03-25 | End: 2025-03-25

## 2025-03-25 RX ORDER — TRANEXAMIC ACID 650 MG/1
1300 TABLET ORAL ONCE
Status: COMPLETED | OUTPATIENT
Start: 2025-03-25 | End: 2025-03-25

## 2025-03-25 RX ORDER — LIDOCAINE HYDROCHLORIDE 10 MG/ML
0.1 INJECTION, SOLUTION EPIDURAL; INFILTRATION; INTRACAUDAL; PERINEURAL ONCE
Status: DISCONTINUED | OUTPATIENT
Start: 2025-03-25 | End: 2025-03-25 | Stop reason: HOSPADM

## 2025-03-25 RX ORDER — BUPIVACAINE HCL/EPINEPHRINE 0.25-.0005
VIAL (ML) INJECTION AS NEEDED
Status: DISCONTINUED | OUTPATIENT
Start: 2025-03-25 | End: 2025-03-25 | Stop reason: HOSPADM

## 2025-03-25 RX ORDER — DROPERIDOL 2.5 MG/ML
0.62 INJECTION, SOLUTION INTRAMUSCULAR; INTRAVENOUS ONCE AS NEEDED
Status: DISCONTINUED | OUTPATIENT
Start: 2025-03-25 | End: 2025-03-25 | Stop reason: HOSPADM

## 2025-03-25 RX ORDER — SODIUM CHLORIDE 0.9 G/100ML
INJECTION, SOLUTION IRRIGATION AS NEEDED
Status: DISCONTINUED | OUTPATIENT
Start: 2025-03-25 | End: 2025-03-25 | Stop reason: HOSPADM

## 2025-03-25 RX ORDER — GABAPENTIN 600 MG/1
600 TABLET ORAL 3 TIMES DAILY
Qty: 9 TABLET | Refills: 0 | Status: SHIPPED | OUTPATIENT
Start: 2025-03-25 | End: 2025-03-28

## 2025-03-25 RX ORDER — LABETALOL HYDROCHLORIDE 5 MG/ML
5 INJECTION, SOLUTION INTRAVENOUS ONCE AS NEEDED
Status: DISCONTINUED | OUTPATIENT
Start: 2025-03-25 | End: 2025-03-25 | Stop reason: HOSPADM

## 2025-03-25 RX ORDER — DOCUSATE SODIUM 100 MG/1
100 CAPSULE, LIQUID FILLED ORAL 2 TIMES DAILY
Qty: 60 CAPSULE | Refills: 0 | Status: SHIPPED | OUTPATIENT
Start: 2025-03-25 | End: 2025-03-28 | Stop reason: SINTOL

## 2025-03-25 RX ADMIN — SODIUM CHLORIDE, POTASSIUM CHLORIDE, SODIUM LACTATE AND CALCIUM CHLORIDE: 600; 310; 30; 20 INJECTION, SOLUTION INTRAVENOUS at 11:55

## 2025-03-25 RX ADMIN — CEFAZOLIN SODIUM 2 G: 2 INJECTION, SOLUTION INTRAVENOUS at 12:05

## 2025-03-25 RX ADMIN — HYDROMORPHONE HYDROCHLORIDE 0.5 MG: 1 INJECTION, SOLUTION INTRAMUSCULAR; INTRAVENOUS; SUBCUTANEOUS at 13:57

## 2025-03-25 RX ADMIN — HYDROMORPHONE HYDROCHLORIDE 0.5 MG: 1 INJECTION, SOLUTION INTRAMUSCULAR; INTRAVENOUS; SUBCUTANEOUS at 14:11

## 2025-03-25 RX ADMIN — ACETAMINOPHEN 975 MG: 325 TABLET ORAL at 10:35

## 2025-03-25 RX ADMIN — CELECOXIB 400 MG: 200 CAPSULE ORAL at 10:35

## 2025-03-25 RX ADMIN — TRANEXAMIC ACID 1300 MG: 650 TABLET ORAL at 10:35

## 2025-03-25 RX ADMIN — FENTANYL CITRATE 50 MCG: 50 INJECTION, SOLUTION INTRAMUSCULAR; INTRAVENOUS at 12:51

## 2025-03-25 RX ADMIN — OXYCODONE 5 MG: 5 TABLET ORAL at 15:08

## 2025-03-25 RX ADMIN — GABAPENTIN 600 MG: 300 CAPSULE ORAL at 10:35

## 2025-03-25 RX ADMIN — SUGAMMADEX 200 MG: 100 INJECTION, SOLUTION INTRAVENOUS at 13:40

## 2025-03-25 RX ADMIN — FENTANYL CITRATE 50 MCG: 50 INJECTION, SOLUTION INTRAMUSCULAR; INTRAVENOUS at 13:31

## 2025-03-25 RX ADMIN — LIDOCAINE HYDROCHLORIDE 80 MG: 20 INJECTION, SOLUTION INFILTRATION; PERINEURAL at 12:00

## 2025-03-25 RX ADMIN — ROCURONIUM BROMIDE 50 MG: 10 SOLUTION INTRAVENOUS at 12:00

## 2025-03-25 RX ADMIN — HYDROMORPHONE HYDROCHLORIDE 0.5 MG: 1 INJECTION, SOLUTION INTRAMUSCULAR; INTRAVENOUS; SUBCUTANEOUS at 14:05

## 2025-03-25 RX ADMIN — DEXAMETHASONE SODIUM PHOSPHATE 8 MG: 4 INJECTION, SOLUTION INTRAMUSCULAR; INTRAVENOUS at 12:17

## 2025-03-25 RX ADMIN — PROPOFOL 200 MG: 10 INJECTION, EMULSION INTRAVENOUS at 12:00

## 2025-03-25 RX ADMIN — FENTANYL CITRATE 100 MCG: 50 INJECTION, SOLUTION INTRAMUSCULAR; INTRAVENOUS at 12:00

## 2025-03-25 RX ADMIN — SODIUM CHLORIDE, POTASSIUM CHLORIDE, SODIUM LACTATE AND CALCIUM CHLORIDE 50 ML/HR: 600; 310; 30; 20 INJECTION, SOLUTION INTRAVENOUS at 10:36

## 2025-03-25 RX ADMIN — MIDAZOLAM 2 MG: 1 INJECTION INTRAMUSCULAR; INTRAVENOUS at 11:54

## 2025-03-25 RX ADMIN — FENTANYL CITRATE 50 MCG: 50 INJECTION, SOLUTION INTRAMUSCULAR; INTRAVENOUS at 12:36

## 2025-03-25 RX ADMIN — ONDANSETRON 4 MG: 2 INJECTION, SOLUTION INTRAMUSCULAR; INTRAVENOUS at 13:15

## 2025-03-25 RX ADMIN — FAMOTIDINE 20 MG: 10 INJECTION, SOLUTION INTRAVENOUS at 12:17

## 2025-03-25 ASSESSMENT — PAIN SCALES - GENERAL
PAINLEVEL_OUTOF10: 8
PAIN_LEVEL: 4
PAINLEVEL_OUTOF10: 10 - WORST POSSIBLE PAIN
PAINLEVEL_OUTOF10: 0 - NO PAIN
PAINLEVEL_OUTOF10: 6
PAINLEVEL_OUTOF10: 5 - MODERATE PAIN
PAINLEVEL_OUTOF10: 9
PAINLEVEL_OUTOF10: 3
PAINLEVEL_OUTOF10: 5 - MODERATE PAIN

## 2025-03-25 ASSESSMENT — PAIN DESCRIPTION - DESCRIPTORS
DESCRIPTORS: PRESSURE
DESCRIPTORS: CRAMPING;SORE
DESCRIPTORS: CRAMPING;SORE
DESCRIPTORS: SORE;CRAMPING

## 2025-03-25 ASSESSMENT — PAIN - FUNCTIONAL ASSESSMENT
PAIN_FUNCTIONAL_ASSESSMENT: 0-10

## 2025-03-25 ASSESSMENT — PAIN DESCRIPTION - LOCATION
LOCATION: ABDOMEN

## 2025-03-25 ASSESSMENT — COLUMBIA-SUICIDE SEVERITY RATING SCALE - C-SSRS
1. IN THE PAST MONTH, HAVE YOU WISHED YOU WERE DEAD OR WISHED YOU COULD GO TO SLEEP AND NOT WAKE UP?: NO
6. HAVE YOU EVER DONE ANYTHING, STARTED TO DO ANYTHING, OR PREPARED TO DO ANYTHING TO END YOUR LIFE?: NO
2. HAVE YOU ACTUALLY HAD ANY THOUGHTS OF KILLING YOURSELF?: NO

## 2025-03-25 NOTE — ANESTHESIA PROCEDURE NOTES
Airway  Date/Time: 3/25/2025 12:04 PM  Urgency: elective    Airway not difficult    Staffing  Performed: CRNA   Authorized by: Vazquez Miller DO    Performed by: KUN Gotti-NICOLE  Patient location during procedure: OR    Indications and Patient Condition  Indications for airway management: anesthesia and airway protection  Spontaneous Ventilation: absent  Sedation level: deep  Preoxygenated: yes  Patient position: sniffing  MILS maintained throughout  Mask difficulty assessment: 1 - vent by mask  Planned trial extubation    Final Airway Details  Final airway type: endotracheal airway      Successful airway: ETT  Cuffed: yes   Successful intubation technique: direct laryngoscopy  Facilitating devices/methods: intubating stylet  Endotracheal tube insertion site: oral  Blade: Jackson  Blade size: #3  ETT size (mm): 7.0  Cormack-Lehane Classification: grade I - full view of glottis  Placement verified by: chest auscultation and capnometry   Cuff volume (mL): 7  Measured from: gums  ETT to gums (cm): 22  Number of attempts at approach: 1    Additional Comments  atraumatic

## 2025-03-25 NOTE — H&P
History Of Present Illness  Rosalva Pineda is a 29 y.o. female presenting with Abnormal uterine bleeding (AUB) [N93.9]  Maternal care for  scar defect (isthmocele) ([facility]) [O34.22]Pre-op Diagnosis      * Abnormal uterine bleeding (AUB) [N93.9]     * Maternal care for  scar defect (isthmocele) ([facility]) [O34.22]     Past Medical History  Past Medical History:   Diagnosis Date    Abnormal uterine bleeding (AUB)     Anxiety     COVID-19     NOT VACCINATED    Depression     Elevated blood pressure reading     Migraine     Postoperative wound infection 10/26/24       Surgical History  Past Surgical History:   Procedure Laterality Date    APPENDECTOMY  10/24/2024     SECTION, LOW TRANSVERSE N/A 10/19/2023    PELVIC LAPAROSCOPY  2024    IUD ressection after abnormal migration    TONSILECTOMY, ADENOIDECTOMY, BILATERAL MYRINGOTOMY AND TUBES          Social History  She reports that she has never smoked. She has never used smokeless tobacco. She reports that she does not currently use alcohol. She reports that she does not use drugs.    Family History  Family History   Problem Relation Name Age of Onset    Other (cardiac disorder) Mother Natalie     Hypertension Mother Natalie     Other (mental disorder) Mother Natalie     Thyroid disease Mother Natalie     Diabetes Father Rober     Hypertension Father Rober     Asthma Sister Nia Pineda     Immunodeficiency Sister Nia Pineda     Other (cardiac disease) Paternal Grandmother Samina     Stroke Paternal Grandmother Samina     Asthma Sister Nia         Allergies  Cefaclor    Review of systems   12 point review of systems was performed and noncontributory    Physical exam  General: Appears stated age, no acute distress   Head: NCAT  Skin: Not diaphoretic, no flushing,   Eye: PERRL, EOMI   Respiratory: No respiratory distress or shortness of breath   Musculoskeletal:  BLE and BUE movement intact   Neuro: normal speech, no gait abnormalities noted  Psych: normal  affect     Last Recorded Vitals  Weight 86.2 kg (190 lb), currently breastfeeding.    Relevant Results           Assessment/Plan       OR LAPS TOTAL HYSTERECT 250 GM/< W/RMVL TUBE/OVARY [06175] (Total laparoscopic hysterectomy bilateral salpingectomy cystoscopy)         Imer Merino MD

## 2025-03-25 NOTE — ANESTHESIA POSTPROCEDURE EVALUATION
Patient: Rosalva Pineda    Procedure Summary       Date: 25 Room / Location: [facility] OR  [facility] OR    Anesthesia Start: 1155 Anesthesia Stop: 1356    Procedure: Total laparoscopic hysterectomy, bilateral salpingectomy, cystoscopy, suburethral sling Diagnosis:       Abnormal uterine bleeding (AUB)      Maternal care for  scar defect (isthmocele) ([facility])      (Abnormal uterine bleeding (AUB) [N93.9])      (Maternal care for  scar defect (isthmocele) ([facility]) [O34.22])    Surgeons: Imer Merino MD Responsible Provider: Vazquez Miller DO    Anesthesia Type: general ASA Status: 2            Anesthesia Type: general    Vitals Value Taken Time   /72 25 1351   Temp 36.5 25 1356   Pulse 80 25 1355   Resp 11 25 1355   SpO2 97 % 25 1355   Vitals shown include unfiled device data.    Anesthesia Post Evaluation    Patient location during evaluation: bedside  Patient participation: complete - patient participated  Level of consciousness: awake and alert  Pain score: 4  Pain management: adequate  Airway patency: patent  Cardiovascular status: acceptable and stable  Respiratory status: acceptable and room air  Hydration status: stable  Postoperative Nausea and Vomiting: none and mild        No notable events documented.

## 2025-03-25 NOTE — ANESTHESIA PREPROCEDURE EVALUATION
Patient: Rosalva Pineda    Procedure Information       Date/Time: 03/25/25 0150    Procedure: Total laparoscopic hysterectomy bilateral salpingectomy cystoscopy    Location: [facility] OR 07 / [facility] OR    Surgeons: Imer Merino MD            Relevant Problems   Neuro   (+) Anxiety and depression      Endocrine   (+) Class 1 obesity in adult      GYN   (+) Abnormal uterine bleeding (AUB)       Clinical information reviewed:   Tobacco  Allergies  Meds   Med Hx  Surg Hx   Fam Hx  Soc Hx        NPO Detail:  No data recorded     Physical Exam    Airway  Mallampati: II     Cardiovascular   Rhythm: regular  Rate: normal     Dental    Pulmonary - normal exam     Abdominal - normal exam             Anesthesia Plan    History of general anesthesia?: yes  History of complications of general anesthesia?: no    ASA 2     general     intravenous induction   Postoperative administration of opioids is intended.  Anesthetic plan and risks discussed with patient.

## 2025-03-25 NOTE — NURSING NOTE
Vaginal packing removed scant red drainage seen on packing.  Pt tolerated well.  300cc of NS instilled into lundberg catheter prior to removal.  Pt assisted to bathroom.  Unable to void.  Pt returned to room, more time given to try and void.

## 2025-03-25 NOTE — OP NOTE
Date: 3/25/2025  OR Location: [facility] OR    Name: Rosalva Pineda : 1995, Age: 29 y.o., MRN: [id number], Sex: female    Diagnosis  * No surgery found * Post-op Diagnosis     * Abnormal uterine bleeding (AUB) [N93.9]     * Maternal care for  scar defect (isthmocele) ([facility]) [O34.22]     Procedures  Total laparoscopic hysterectomy, bilateral salpingectomy, cystoscopy, suburethral sling  51466 - [place] LAPS TOTAL HYSTERECT 250 GM/< W/RMVL TUBE/OVARY        Surgeons      * Imer Merino - Primary    Resident/Fellow/Other Assistant:  Surgeons and Role:  * No surgeons found with a matching role *    Procedure Summary  Anesthesia: General  ASA: II  Anesthesia Staff: Anesthesiologist: Vazquez Miller DO  CRNA: KUN Gotti-NICOLE    Estimated Blood Loss: < 50 mL     Findings: normal anatomy      Specimens: Tubes uterus cervix      Procedure Details:    Procedure:  After informed consent the patient was brought to the operating room. The patient was intubated without complication after general anesthesia was started. She is placed in lithotomy position in yellowfin stirrups. An exam under anesthesia was performed. She was prepped draped in the normal sterile fashion with Chlorhexidene vaginal and skin prep. Timeout was performed. She was given preoperative antibiotics. Thomas was introduced into the bladder with with production of clear urine. A speculum was placed in the vagina and anterior lip of the cervix grasped with a single-tooth tenaculum. The cervix was injected with marcaine epinephrine and vasopression intracervically. The uterus was sounded and dilated a uterine manipulator was placed in the uterine cavity. Attention was then paid to the abdomen.  Marcaine was injected at the umbilicus and a scalpel incision was made midline to 15 mm down to the underlying fascia which was opened sharply, the peritoneal cavity was opened bluntly. A Single site port was placed. The peritoneum was  insufflated and 1, 5 mm trochar was placed in the left lower quadrant under direct visualization.  The abdomen was surveyed and normal anatomy was then restored. The round ligament to the utero-ovarian ligament broad ligament and uterine artery were sealed and divided away bilaterally. The bladder was backfilled to identify the edge. Bladder flap was created and came across the anterior flap. The colpotomy was then performed anteriorly and came around circumferentially. The colpotomy was able to be completed and the uterus was removed through the vagina. Suction and irrigation were performed to allow to the edges of the vagina which was then closed laparoscopically in a horizontal running fashion using the V-lock suture. The tubes were sealed and divided away and removed from the abdomen. Hemostasis was noted and all surfaces were well visualized, all irrigation fluid was removed from the peritoneum. The diaphragm was irrigated with a dilute lidocaine and bicarb solution and left in place. The rectus fascia the umbilicus was then closed. Subcutaneous tissue was irrigated and closed skin was closed on the 2 ports skin glue was placed over all incisions. The pneumo-occluder was removed from the vagina vaginoscopy done, blood clots were then removed excellent cuff closure was noted.  Cystoscopy is then performed identifying ureteral orifices bilaterally and no intrusion or distortion upon the bladder. Bilateral reflux from the ureter orifices was noted.    The suburethral sling was then performed. Attention was then paid to the anterior vagina after a [place] retractor was placed on the vulva. Allis was used to grasp the vaginal mucosa underneath the urethra in the midline and incision was made 3 cm at the mid urethra. The vagina was dissected out off the underlying tissue after injection of lidocaine. This was performed deep to the pelvic membrane. Attention was paid to the suprapubic region where 2 trochars were  placed from the pubis through the space of Retzius into the previously made dissection. Cystoscopy was performed and confirmed appropriate placement of both trochars with no interruption in the bladder. The bilateral trochars were then in appropriate positions. Thomas was replaced. The suburethral sling was pulled through the trocar sites and tension in the standard manner, allowing adequate space and no tension. The mesh was then trimmed at the skin and the vagina was closed in a running manner with interrupted stitches between.  The vagina was packed with moistened saline Thomas was left in place. Procedure was then ended sponge lap needle counts are reported as correct ×2. The patient was transferred to PACU in stable condition.          Imer Merino MD

## 2025-03-25 NOTE — DISCHARGE INSTRUCTIONS
Physician phone number provided to patient    General Anesthesia Discharge Instructions    About this topic  You may need general anesthesia if you need to be asleep during a procedure. Your doctor will use drugs to block the signals that go from your nerves to your brain. Doctors give general anesthesia during a surgery or procedure to:  Allow you to sleep  Help your body be still  Relax your muscles  Help you to relax and be pain free  Keep you from remembering the surgery  Let the doctor manage your airway, breathing, and blood flow  The doctor or nurse anesthetist gives general anesthesia by a shot into your vein. Sometimes, you may breathe in a gas through a mask placed over your face.  What care is needed at home?  Ask your doctor what you need to do when you go home. Make sure you ask questions if you do not understand what the doctor says.  Your doctor may give you drugs to prevent or treat an upset stomach from the anesthetic. Take them as ordered.  If your throat is sore, suck on ice chips or popsicles to ease throat pain.  Put 2 to 3 pillows under your head and back when you lie down to help you breathe easier.  For the first 24 to 48 hours:  Do not operate heavy or dangerous machinery.  Do not make major decisions or sign important papers. You may not be able to think clearly.  Avoid beer, wine, or mixed drinks.  You are at a higher risk of falling for at least 24 hours after general anesthesia.  Take extra care when you get up.  Do not change positions quickly.  Do not rush when you need to go to the bathroom or to answer the phone.  Ask for help if you feel unsteady when you try to walk.  Wear shoes with non-slip soles and low heels.  What follow-up care is needed?  Your doctor may ask you to come back to the office to check on your progress. Be sure to keep these visits.  If you have stitches that do not dissolve or staples, you will need to have them removed. Your doctor will want to do this in 1 to  2 weeks. If the doctor used skin glue, the glue will fall off on its own.  What drugs may be needed?  The doctor may order drugs to:  Help with pain  Treat an upset stomach or throwing up  Will physical activity be limited?  You will not be allowed to drive right away after the procedure. Ask a family member or a friend to drive you home.  Avoid trying to get out of bed without help until you are sure of your balance.  You may have to limit your activity. Talk to your doctor about if you need to limit how much you lift or limit exercise after your procedure.  What changes to diet are needed?  Start with a light diet when you are fully awake. This includes things that are easy to swallow like soups, pudding, jello, toast, and eggs. Slowly progress to your normal diet.  What problems could happen?  Low blood pressure  Breathing problems  Upset stomach or throwing up  Dizziness  Blood clots  Infection  When do I need to call the doctor?  Trouble breathing  Upset stomach or throwing up more than 3 times in the next 2 days  Dizziness  Teach Back: Helping You Understand  The Teach Back Method helps you understand the information we are giving you. After you talk with the staff, tell them in your own words what you learned. This helps to make sure the staff has described each thing clearly. It also helps to explain things that may have been confusing. Before going home, make sure you can do these:  I can tell you about my procedure.  I can tell you if I need to follow up with my doctor.  I can tell you what is good for me to eat and drink the next day.  I can tell you what I would do if I have trouble breathing, an upset stomach, or dizziness.  Where can I learn more?  [employer]  https://www.nigms.nih.gov/education/pages/factsheet_Anesthesia.aspx  N[facility] Choices  http://www.nhs.uk/conditions/Anaesthetic-general/Pages/Definition.aspx  Last Reviewed Date  2020-04-22    How to Care for Your  Thomas Catheter   About this topic   A Thomas catheter is a thin, flexible tube that drains urine from your bladder. The catheter joins your bladder to a special bag outside of your body. The bag holds the urine until you are able to empty the bag. You may need to have a catheter for a short time. You may need a catheter after you are sick or have had surgery. You may also need a catheter if your bladder isn’t working properly or you have a large prostate. Sometimes, a catheter is used for a long time.       What will the results be?   Your urine will drain and you will prevent infection.  What care is needed at home?   Ask your doctor what you need to do when you go home. Make sure you ask questions if you do not understand what the doctor says. This way you will know what you need to do.  Your doctor may order a home health nurse to come to your house to help you learn to care for your catheter.  Prevent infections:  Wash your hands before and after handling your catheter.  If you switch between a leg bag and an overnight drainage bag, be sure you clean the connection between the catheter and the bag with an alcohol swab before you switch bags.  Rinse your used, empty drainage bag with 1 cup (240 mL) vinegar mixed with 1 cup (240 mL) water. Shake mixture around and allow to sit for about 15 minutes. Rinse thoroughly with cool water and allow to dry.  Place a cap on the drainage bag you are not using and store in a clean towel. It is helpful to have an extra bag ready for use.  Care for the catheter:  Wash the skin around the catheter with soap and water each day. Rinse well and pat the skin dry.  Do not use lotions or cream on the tube.  Keep the tube secure. You may want to use Velcro straps or some other device to keep the bag or catheter on your leg.  Do not let the tube pull or catch when you are moving around.  Do not let the tube kink or loop.  Do not clamp the catheter or tubing unless you were told to do  this.  Care for the drainage bag:  Keep your urine bag below your bladder.  Drain the bag often to help keep you from getting an infection.  To empty the bag:  Wash your hands.  Slide the drain tube out of its moore.  Place the drain tube over the toilet or clean container. Do not let the drain tube touch the toilet or container.  Open the clamp or valve and drain the urine.  When the bag is empty, close the clamp or valve and place it back in the moore.  Wash your hands.  To change bags:  Wash your hands.  Empty the bag.  Place a towel under the place where the catheter and the tubing connect.  Wipe the connection with an alcohol wipe and let air dry.  Pinch the soft rubber catheter so urine does not leak out.  Disconnect the tubing from the catheter with a twisting motion.  Use another alcohol wipe and clean the end of the catheter, moving away from the opening.  Take cap off of new tubing and bag and attach catheter to tubing.  Stop pinching the catheter and let the urine drain into new tubing and bag.  Make sure catheter and bag are secure and do not pull.  Wash your hands.  Wear cotton underwear.  Check that there is urine in the bag. If no urine in the bag:  Change your body position to help urine flow out.  Check the tubing and catheter for kinks or loops. Be sure the tube is not clamped.  Make sure the bag is lower than your bladder.  What follow-up care is needed?   Your doctor may ask you to make visits to the office to check on your progress. Be sure to keep these visits.  If you need a catheter for a long time, it will need to be exchanged every few weeks to prevent infection.  What lifestyle changes are needed?   Drink 6 to 8 glasses of water every day.  Take showers. Do not soak in a bath, hot tub, pool, or other body of water while the catheter is in.  Will physical activity be limited?   Talk to your doctor about what you can and cannot do when the catheter is in place.   What problems could  happen?   The catheter has a balloon to hold it inside the bladder. The balloon can break or leak and the catheter can fall out.  Urine flow stops or is blocked by kinks or bends in the tube, or if the drain bag is kept higher than your bladder  You may see blood in the collecting tube or bag. Sometimes the blood can make clots that can clog the catheter.  Bladder infection  Irritation where the catheter goes into your body  Skin irritation from the straps or tube moore  When do I need to call the doctor?   Signs of infection like a fever of 100.4°F (38°C) or higher, chills, pain around the catheter, or redness or swelling of the skin around the catheter.  Urine has blood in it, is dark or coffee colored, has bits of tissue or other objects in it, or is pus-like  Tube comes out or urine stops flowing  Burning or painful feeling in your bladder  Teach Back: Helping You Understand   The Teach Back Method helps you understand the information we are giving you. After you talk with the staff, tell them in your own words what you learned. This helps to make sure the staff has described each thing clearly. It also helps to explain things that may have been confusing. Before going home, make sure you can do these:  I can tell you about my condition.  I can tell you how to prevent infection and care for the tube and bag of my lundberg catheter.  I can tell you what I will do if my urine stops flowing or there is a burning or painful feeling in my bladder.  Last Reviewed Date   2021-03-30  Consumer Information Use and Disclaimer   This generalized information is a limited summary of diagnosis, treatment, and/or medication information. It is not meant to be comprehensive and should be used as a tool to help the user understand and/or assess potential diagnostic and treatment options. It does NOT include all information about conditions, treatments, medications, side effects, or risks that may apply to a specific patient. It is  not intended to be medical advice or a substitute for the medical advice, diagnosis, or treatment of a health care provider based on the health care provider's examination and assessment of a patient’s specific and unique circumstances. Patients must speak with a health care provider for complete information about their health, medical questions, and treatment options, including any risks or benefits regarding use of medications. This information does not endorse any treatments or medications as safe, effective, or approved for treating a specific patient. Up[employer], Inc[employer] and its affiliates disclaim any warranty or liability relating to this information or the use thereof. The use of this information is governed by the Terms of Use, available at https://www.[employer].com/en/know/clinical-effectiveness-terms   Copyright   Copyright © 2024 [employer]ToDate, Inc. and its affiliates and/or licensors. All rights reserved.

## 2025-03-26 ENCOUNTER — TELEPHONE (OUTPATIENT)
Dept: OBSTETRICS AND GYNECOLOGY | Facility: CLINIC | Age: 30
End: 2025-03-26
Payer: COMMERCIAL

## 2025-03-26 NOTE — TELEPHONE ENCOUNTER
Spoke with Pt. Pt reports a lot of pain and pressure. She is post-op TLH, sub urethral sling. Pt was discharged with an indwelling lundberg catheter d/t being unable to void more than 50 ml post-op.   Pt says when she was home last night, she had a purple bruise lower abd/pelvic area, today it is red. She noted she used a heating pad.   Pt c/o pressure and more pain than anticipated.   Pt confirms urine output. She reports emptying the lundberg 3 times since being sent home yesterday. There is no blood noted in the urine.   She is taking her post-op pain medications as outlined per Dr. Merino.   She expressed a lot of anxiety, has a med/surg history and feels if something goes wrong, it does with her so she just wants to be on top of it. Reassurance given.   Discussed common post-op symptoms. Discussed gas pain from infaltion of the abd. cavity during surgery, post-op bruising, when to be concerned, what to look for with infections- (not a concern at this time). Discussed methods such as light activity, no lifting, heat or ice in 20 min intervals. Pt VU. She does have help from her friend who is a nurse and her mom.   Discussed lundberg removal. Pt will come in tomorrow morning between 830-9am.

## 2025-03-27 ENCOUNTER — CLINICAL SUPPORT (OUTPATIENT)
Dept: OBSTETRICS AND GYNECOLOGY | Facility: CLINIC | Age: 30
End: 2025-03-27
Payer: COMMERCIAL

## 2025-03-27 NOTE — PROGRESS NOTES
Rosalva is here today to have her lundberg removed. She is post-op TLH w/ sub urethral sling day 2.   Rosalva is doing better than we first spoke yesterday. Abdomen observed, small area of redness around umbilical incision is WNL for this stage of healing. There is no drainage, inflammation or warmth, glue is intact. No obvious abd/pelvic bruising noted.   Pt reports she is managing her pain better today, is still very sore, c/o pressure, discomfort, attributes it to gas pain.   Pt c/o some SOB post op. Denies sharp pains with breathing or cough. Advised Pt to cough and deep breathe every two hours and continue light activity and ambulation. Educated on signs and sx of PE, and when to seek medical care- worsening SOB,  or severe chest pain, sharp or stabbing pain with inhalation or cough, coughing up blood etc. Pt VU.   Lundberg is removed without difficulty. There was approx. 300ml clear yellow urine noted.   Pt was able to void without difficulty after the lundberg was removed before leaving the office.

## 2025-03-28 ENCOUNTER — HOSPITAL ENCOUNTER (EMERGENCY)
Facility: HOSPITAL | Age: 30
Discharge: LEFT BEFORE DISCHARGE | End: 2025-03-28
Payer: COMMERCIAL

## 2025-03-28 ENCOUNTER — TELEPHONE (OUTPATIENT)
Dept: OBSTETRICS AND GYNECOLOGY | Facility: HOSPITAL | Age: 30
End: 2025-03-28
Payer: COMMERCIAL

## 2025-03-28 ENCOUNTER — HOSPITAL ENCOUNTER (EMERGENCY)
Facility: HOSPITAL | Age: 30
Discharge: HOME | End: 2025-03-28
Attending: STUDENT IN AN ORGANIZED HEALTH CARE EDUCATION/TRAINING PROGRAM
Payer: COMMERCIAL

## 2025-03-28 ENCOUNTER — APPOINTMENT (OUTPATIENT)
Dept: RADIOLOGY | Facility: HOSPITAL | Age: 30
End: 2025-03-28
Payer: COMMERCIAL

## 2025-03-28 VITALS
TEMPERATURE: 99.3 F | HEIGHT: 65 IN | OXYGEN SATURATION: 99 % | HEART RATE: 95 BPM | BODY MASS INDEX: 30.49 KG/M2 | RESPIRATION RATE: 20 BRPM | WEIGHT: 183 LBS | DIASTOLIC BLOOD PRESSURE: 76 MMHG | SYSTOLIC BLOOD PRESSURE: 116 MMHG

## 2025-03-28 DIAGNOSIS — K59.00 CONSTIPATION, UNSPECIFIED CONSTIPATION TYPE: ICD-10-CM

## 2025-03-28 DIAGNOSIS — R33.9 URINE RETENTION: Primary | ICD-10-CM

## 2025-03-28 PROCEDURE — 74018 RADEX ABDOMEN 1 VIEW: CPT | Mod: FOREIGN READ | Performed by: RADIOLOGY

## 2025-03-28 PROCEDURE — 99283 EMERGENCY DEPT VISIT LOW MDM: CPT | Performed by: STUDENT IN AN ORGANIZED HEALTH CARE EDUCATION/TRAINING PROGRAM

## 2025-03-28 PROCEDURE — 51798 US URINE CAPACITY MEASURE: CPT

## 2025-03-28 PROCEDURE — 4500999001 HC ED NO CHARGE

## 2025-03-28 PROCEDURE — 99285 EMERGENCY DEPT VISIT HI MDM: CPT | Performed by: STUDENT IN AN ORGANIZED HEALTH CARE EDUCATION/TRAINING PROGRAM

## 2025-03-28 PROCEDURE — 2500000005 HC RX 250 GENERAL PHARMACY W/O HCPCS: Performed by: STUDENT IN AN ORGANIZED HEALTH CARE EDUCATION/TRAINING PROGRAM

## 2025-03-28 PROCEDURE — 74018 RADEX ABDOMEN 1 VIEW: CPT

## 2025-03-28 PROCEDURE — 51702 INSERT TEMP BLADDER CATH: CPT | Performed by: STUDENT IN AN ORGANIZED HEALTH CARE EDUCATION/TRAINING PROGRAM

## 2025-03-28 PROCEDURE — 2500000001 HC RX 250 WO HCPCS SELF ADMINISTERED DRUGS (ALT 637 FOR MEDICARE OP): Performed by: STUDENT IN AN ORGANIZED HEALTH CARE EDUCATION/TRAINING PROGRAM

## 2025-03-28 RX ORDER — ALUMINUM HYDROXIDE, MAGNESIUM HYDROXIDE, AND SIMETHICONE 1200; 120; 1200 MG/30ML; MG/30ML; MG/30ML
20 SUSPENSION ORAL ONCE
Status: COMPLETED | OUTPATIENT
Start: 2025-03-28 | End: 2025-03-28

## 2025-03-28 RX ORDER — PSYLLIUM HUSK 0.4 G
5 CAPSULE ORAL DAILY PRN
Qty: 30 CAPSULE | Refills: 0 | Status: SHIPPED | OUTPATIENT
Start: 2025-03-28 | End: 2025-04-27

## 2025-03-28 RX ORDER — POLYETHYLENE GLYCOL 3350 17 G/17G
17 POWDER, FOR SOLUTION ORAL DAILY
Qty: 3 PACKET | Refills: 0 | Status: SHIPPED | OUTPATIENT
Start: 2025-03-28 | End: 2025-03-31

## 2025-03-28 RX ORDER — SENNOSIDES 8.6 MG/1
2 TABLET ORAL DAILY PRN
Qty: 30 TABLET | Refills: 0 | Status: SHIPPED | OUTPATIENT
Start: 2025-03-28 | End: 2026-03-28

## 2025-03-28 RX ADMIN — ALUMINUM HYDROXIDE, MAGNESIUM HYDROXIDE, AND DIMETHICONE 20 ML: 200; 20; 200 SUSPENSION ORAL at 18:52

## 2025-03-28 RX ADMIN — SODIUM PHOSPHATE, DIBASIC AND SODIUM PHOSPHATE, MONOBASIC 1 ENEMA: 7; 19 ENEMA RECTAL at 18:52

## 2025-03-28 ASSESSMENT — PAIN - FUNCTIONAL ASSESSMENT: PAIN_FUNCTIONAL_ASSESSMENT: 0-10

## 2025-03-28 ASSESSMENT — PAIN SCALES - GENERAL: PAINLEVEL_OUTOF10: 10 - WORST POSSIBLE PAIN

## 2025-03-28 ASSESSMENT — PAIN DESCRIPTION - LOCATION: LOCATION: ABDOMEN

## 2025-03-28 ASSESSMENT — PAIN DESCRIPTION - DESCRIPTORS: DESCRIPTORS: ACHING

## 2025-03-28 NOTE — PROGRESS NOTES
Being sent in by OB GYN outpatient office for lundberg placement in the setting of failed TOV in the outpt office  Post-op Laparoscopic hysterectomy w/ sub urethral sling (3/25/2025)  She was seen in the outpatient setting and failed trial of void.  She was sent to the ED to have Lundberg placed.  OB requested not to attempt straight cath into displaced Lundberg.  GYN will follow-up in the outpatient setting for a second trial of void.  They recommended ED discharge with Lundberg in place.

## 2025-03-28 NOTE — ED PROVIDER NOTES
History of Present Illness     History provided by: Patient and Family Member  Limitations to History: None  External Records Reviewed with Brief Summary:  3/25/2025 op note patient had laparoscopic total hysterectomy, bilateral salpingectomy, suburethral sling    HPI:  Rosalva Pineda is a 29 y.o. female history of anxiety, depression, postop day 2 from laparoscopic hysterectomy and suburethral sling presenting with urine retention.  Evaluated at her outpatient GYN appointment yesterday, Thomas was removed and patient was able to pass trial of void at that time.  She has been having urinary retention since yesterday however.  She was sent in by her GYN team for Thomas placement.  GYN team will see the patient again in the outpatient setting Monday.  They do not want to trial a straight cath given the patient is recently postop from suburethral sling.  Patient does have additional complaints since the surgery.  Notes constipation, and bloating.  She tried Dulcolax yesterday.  Today tried MiraLAX, milk of magnesia.  Feels like she does have to make a bowel movement but has been unable to pass.      Physical Exam   Triage vitals:  T 37.4 °C (99.3 °F)  HR (!) 110  /88  RR 19  O2 99 % None (Room air)    Physical Exam  Vitals and nursing note reviewed.   Constitutional:       General: She is not in acute distress.     Appearance: She is well-developed.   HENT:      Head: Normocephalic and atraumatic.   Eyes:      Conjunctiva/sclera: Conjunctivae normal.   Cardiovascular:      Rate and Rhythm: Regular rhythm. Tachycardia present.      Heart sounds: No murmur heard.  Pulmonary:      Effort: Pulmonary effort is normal. No respiratory distress.      Breath sounds: Normal breath sounds.   Abdominal:      Palpations: Abdomen is soft.      Tenderness: There is no abdominal tenderness. There is no guarding or rebound.      Comments: Incision site without erythema or purulent drainage.  No induration or fluctuance.  There  is some skin irritation at the stomach where the patient's underwear last sits.  She has no pain or tenderness there  Bladder scan shows greater than 650 cc in the bladder.   Musculoskeletal:         General: No swelling.      Cervical back: Neck supple.   Skin:     General: Skin is warm and dry.      Capillary Refill: Capillary refill takes less than 2 seconds.   Neurological:      Mental Status: She is alert.   Psychiatric:         Mood and Affect: Mood normal.          Medical Decision Making & ED Course   Medical Decision Makin y.o. female postop day 2 after laparoscopic hysterectomy and urethral sling Presenting with constipation and urine retention.  Has been passing flatus and has no tenderness on exam.  Do not suspect obstruction at this time.  Given patient received Dulcolax which has been proven to be as effective as placebo and has only had 1 day of MiraLAX and milk of magnesia as she would likely benefit from a bowel regimen.  Her constipation is likely exacerbated by failure to void.  After Thomas is placed will reassess patient's constipation.  Will consider enema and additional bowel regimen as needed.  Patient did request abdominal x-ray to evaluate for stool burden.  Will evaluate need for abdominal x-ray for stool burden after Thomas is placed and patient is reassessed.  The patient is tachycardic on arrival, likely related to acute distress in the setting of acute urinary retention    See ED course for additional workup    Patient had a bowel movement while in the ED after receiving enema.  Abdominal x-ray did not show any acute abdominal pathology.  Patient has been able to make urine through the Thomas.  Feels much better.  Stable for discharge with outpatient follow-up.  Given a bowel regimen.  ----    Complexity of problems addressed      Chronic conditions affecting the patient's care: Anxiety, AUB, depression      Care considerations:   Received call from GYN -patient was sent in for  William madison, see progress note      Social Determinants of Health which Significantly Impact Care:    Social Drivers of Health     Tobacco Use: Low Risk  (3/28/2025)    Patient History     Smoking Tobacco Use: Never     Smokeless Tobacco Use: Never     Passive Exposure: Not on file   Alcohol Use: Not At Risk (10/24/2024)    AUDIT-C     Frequency of Alcohol Consumption: Never     Average Number of Drinks: Patient does not drink     Frequency of Binge Drinking: Never   Financial Resource Strain: Low Risk  (10/24/2024)    Overall Financial Resource Strain (CARDIA)     Difficulty of Paying Living Expenses: Not very hard   Food Insecurity: No Food Insecurity (10/24/2024)    Hunger Vital Sign     Worried About Running Out of Food in the Last Year: Never true     Ran Out of Food in the Last Year: Never true   Transportation Needs: No Transportation Needs (10/24/2024)    PRAPARE - Transportation     Lack of Transportation (Medical): No     Lack of Transportation (Non-Medical): No   Physical Activity: Sufficiently Active (10/18/2023)    Exercise Vital Sign     Days of Exercise per Week: 5 days     Minutes of Exercise per Session: 30 min   Stress: No Stress Concern Present (10/24/2024)    [employer] - Occupational Stress Questionnaire     Feeling of Stress : Not at all   Social Connections: Moderately Isolated (10/18/2023)    Social Connection and Isolation Panel [NHANES]     Frequency of Communication with Friends and Family: More than three times a week     Frequency of Social Gatherings with Friends and Family: More than three times a week     Attends Oriental orthodox Services: Never     Active Member of Clubs or Organizations: No     Attends Club or Organization Meetings: Never     Marital Status: Living with partner   Depression: Not at risk (2/21/2025)    PHQ-2     PHQ-2 Score: 0   Housing Stability: Low Risk  (10/24/2024)    Housing Stability Vital Sign     Unable to Pay for Housing in the  Last Year: No     Number of Times Moved in the Last Year: 0     Homeless in the Last Year: No   Utilities: Not At Risk (10/24/2024)    [facility] Utilities     Threatened with loss of utilities: No   Digital Equity: Not on file   Health Literacy: Not on file         ED Course:  ED Course as of 03/28/25 2114   Fri Mar 28, 2025   1951 Patient had a bowel movement.  Feeling much better.  Will discharge with stool softeners. [FN]   1951 XR abdomen 1 view  On my interpretation CT abdomen shows no free air under the diaphragm.  No sign of obstruction. [FN]      ED Course User Index  [FN] Cora Viera MD         Diagnoses as of 03/28/25 2114   Constipation, unspecified constipation type   Urine retention     Disposition   As a result of the work-up, the patient was discharged home.  she was informed of her diagnosis and instructed to come back with any concerns or worsening of condition.  she and was agreeable to the plan as discussed above.  she was given the opportunity to ask questions.  All of the patient's questions were answered.      Cora Viera MD  Emergency Medicine     Cora Viera MD  03/28/25 2115

## 2025-03-28 NOTE — TELEPHONE ENCOUNTER
Patient paged answering service with urinary retention. Briefly, she is POD#3 from TL, BS, and suburethral sling with Dr. Merino. Lundberg catheter removed in office yesterday and patient passed void trial. Has since been unable to void for the past 12 hours. Reports speaking to Dr. Merino who recommended she present to the ED for lundberg catheter placement with plan for outpatient follow-up with him in the office on Monday. Patient currently in ED waiting room at [facility]. ED notified of anticipated patient.     Deepika Pardo MD

## 2025-03-28 NOTE — ED TRIAGE NOTES
Pt reports hysterectomy with bladder sling on 3/25 and had lundberg removed yesterday in doctor's office and has voided twice since catheter was removed and has been having difficulty urinating. Pt also reports constipation and hasn't had BM since. Pt reports 10/10 lower abdominal pain.

## 2025-03-28 NOTE — DISCHARGE INSTRUCTIONS
You have been evaluated in the Emergency Department today for:  1. Urine retention        2. Constipation, unspecified constipation type          For your constipation stop taking Dulcolax/Colace.  You may take MiraLAX, senna, magnesium, or fiber.  For bloating you may take Maalox/Mylanta or simethicone.    Please follow up with your primary care physician within 1 week and discuss the results of your Emergency Department evaluation with them.    Return to the Emergency Department if you experience any worsening of your symptoms, inability to eat or drink, worsening or new symptoms (difficulty breathing, chest pain, or fever). Please also return if you are not feeling better or have had difficulty following up with an outpatient doctor within one week. You may call of visit the Emergency Department at any time.    Thank you for choosing us for your care.

## 2025-03-31 ENCOUNTER — OFFICE VISIT (OUTPATIENT)
Dept: OBSTETRICS AND GYNECOLOGY | Facility: CLINIC | Age: 30
End: 2025-03-31
Payer: COMMERCIAL

## 2025-03-31 VITALS
HEIGHT: 65 IN | SYSTOLIC BLOOD PRESSURE: 126 MMHG | BODY MASS INDEX: 32.46 KG/M2 | DIASTOLIC BLOOD PRESSURE: 82 MMHG | WEIGHT: 194.8 LBS

## 2025-03-31 DIAGNOSIS — R33.8 ACUTE RETENTION OF URINE: Primary | ICD-10-CM

## 2025-03-31 PROCEDURE — 99024 POSTOP FOLLOW-UP VISIT: CPT | Performed by: OBSTETRICS & GYNECOLOGY

## 2025-03-31 PROCEDURE — 3008F BODY MASS INDEX DOCD: CPT | Performed by: OBSTETRICS & GYNECOLOGY

## 2025-04-02 NOTE — PROGRESS NOTES
GYN PROGRESS NOTE          Chief complaint: follow up    HPI:  Patient answers are not available for this visit.  HPI       Post-op     Additional comments: Est patient             Comments    3/25/2025 laparoscopic hysterectomy, bilateral salpingectomy, suburethral sling  Thomas placed Friday  Having multiple bowel movements (diarrhea). She has been taking milk of mag, prune juice, miralax and colace for constipation.   Friday she had urinary retention with abdominal distention.  Minimal bleeding.   She has some yellow/ bloody discharge, no odor, no itching or burning.   No abdominal pain.   Pain medication has been completed. Using Tylenol and naproxen occasionally.              Last edited by Farhana López CMA on 3/31/2025  9:41 AM.          Reviewed case with patient, reviewed plans.    No backfill performed with Thomas removal    Has had Thomas in since Friday    Gave herself diarrhea with milk of magnesia and prune juice and MiraLAX and Colace    ROS:  GEN - no fevers or chills  RESP - no SOB or cough  GYN - see HPI      HISTORY:  Past Medical History:   Diagnosis Date    Abnormal uterine bleeding (AUB)     Anxiety     COVID-19     NOT VACCINATED    Depression     Elevated blood pressure reading     Migraine     Postoperative wound infection 10/26/24     Past Surgical History:   Procedure Laterality Date    APPENDECTOMY  10/24/2024     SECTION, LOW TRANSVERSE N/A 10/19/2023    HYSTERECTOMY      PELVIC LAPAROSCOPY  2024    IUD ressection after abnormal migration    TONSILECTOMY, ADENOIDECTOMY, BILATERAL MYRINGOTOMY AND TUBES       Social History     Socioeconomic History    Marital status: Significant Other     Spouse name: Jem Rodriguez    Number of children: Not on file    Years of education: Not on file    Highest education level: Not on file   Occupational History    Occupation: Doctor's    Tobacco Use    Smoking status: Never    Smokeless tobacco: Never   Vaping Use    Vaping  status: Never Used   Substance and Sexual Activity    Alcohol use: Not Currently    Drug use: Never    Sexual activity: Yes     Partners: Male     Birth control/protection: Other, None     Comment: Depo injection   Other Topics Concern    Not on file   Social History Narrative    Not on file     Social Drivers of Health     Financial Resource Strain: Low Risk  (10/24/2024)    Overall Financial Resource Strain (CARDIA)     Difficulty of Paying Living Expenses: Not very hard   Food Insecurity: No Food Insecurity (10/24/2024)    Hunger Vital Sign     Worried About Running Out of Food in the Last Year: Never true     Ran Out of Food in the Last Year: Never true   Transportation Needs: No Transportation Needs (10/24/2024)    PRAPARE - Transportation     Lack of Transportation (Medical): No     Lack of Transportation (Non-Medical): No   Physical Activity: Sufficiently Active (10/18/2023)    Exercise Vital Sign     Days of Exercise per Week: 5 days     Minutes of Exercise per Session: 30 min   Stress: No Stress Concern Present (10/24/2024)    [place] [employer] - Occupational Stress Questionnaire     Feeling of Stress : Not at all   Social Connections: Moderately Isolated (10/18/2023)    Social Connection and Isolation Panel [NHANES]     Frequency of Communication with Friends and Family: More than three times a week     Frequency of Social Gatherings with Friends and Family: More than three times a week     Attends Roman [employer] Services: Never     Active Member of Clubs or Organizations: No     Attends Club or Organization Meetings: Never     Marital Status: Living with partner   Intimate Partner Violence: Not At Risk (10/24/2024)    Humiliation, Afraid, Rape, and Kick questionnaire     Fear of Current or Ex-Partner: No     Emotionally Abused: No     Physically Abused: No     Sexually Abused: No   Housing Stability: Low Risk  (10/24/2024)    Housing Stability Vital Sign     Unable to Pay for Housing in  "the Last Year: No     Number of Times Moved in the Last Year: 0     Homeless in the Last Year: No     Cancer-related family history is not on file.       PHYSICAL EXAM:  /82 (BP Location: Right arm, Patient Position: Sitting)   Ht 1.651 m (5' 5\")   Wt 88.4 kg (194 lb 12.8 oz)   LMP  (LMP Unknown)   Breastfeeding Yes   BMI 32.42 kg/m²   Physical examination:  General: No distress  Neck: No masses  Respiratory: No respiratory distress  Abdomen: soft nontender no hernias  GYN: Normal vulvar skin normal clitoral [place] and clitoris labia majora and minora normal vaginal mucosa no evidence of ecchymosis hematoma discharge well-healing fresh incision anterior vaginal wall perianal area: without lesions    280 mL placed inside the bladder Thomas removed void of 260.    IMPRESSION/PLAN:    29-year-old status post hysterectomy and suburethral sling retention now resolved    Follow-up in 2 weeks to review symptoms ensure progress        Imer Merino MD      "

## 2025-04-07 ENCOUNTER — APPOINTMENT (OUTPATIENT)
Dept: OBSTETRICS AND GYNECOLOGY | Facility: CLINIC | Age: 30
End: 2025-04-07
Payer: COMMERCIAL

## 2025-04-15 ENCOUNTER — HOSPITAL ENCOUNTER (OUTPATIENT)
Facility: HOSPITAL | Age: 30
Discharge: HOME | End: 2025-04-15
Attending: EMERGENCY MEDICINE | Admitting: OBSTETRICS & GYNECOLOGY
Payer: COMMERCIAL

## 2025-04-15 ENCOUNTER — PREP FOR PROCEDURE (OUTPATIENT)
Dept: OBSTETRICS AND GYNECOLOGY | Facility: CLINIC | Age: 30
End: 2025-04-15
Payer: COMMERCIAL

## 2025-04-15 ENCOUNTER — ANESTHESIA (OUTPATIENT)
Dept: OPERATING ROOM | Facility: HOSPITAL | Age: 30
End: 2025-04-15
Payer: COMMERCIAL

## 2025-04-15 ENCOUNTER — ANESTHESIA EVENT (OUTPATIENT)
Dept: OPERATING ROOM | Facility: HOSPITAL | Age: 30
End: 2025-04-15
Payer: COMMERCIAL

## 2025-04-15 ENCOUNTER — HOSPITAL ENCOUNTER (OUTPATIENT)
Facility: HOSPITAL | Age: 30
Setting detail: OUTPATIENT SURGERY
End: 2025-04-15
Attending: OBSTETRICS & GYNECOLOGY | Admitting: OBSTETRICS & GYNECOLOGY
Payer: COMMERCIAL

## 2025-04-15 ENCOUNTER — TELEPHONE (OUTPATIENT)
Dept: OBSTETRICS AND GYNECOLOGY | Facility: CLINIC | Age: 30
End: 2025-04-15
Payer: COMMERCIAL

## 2025-04-15 VITALS
WEIGHT: 190 LBS | TEMPERATURE: 97.9 F | DIASTOLIC BLOOD PRESSURE: 69 MMHG | RESPIRATION RATE: 16 BRPM | HEIGHT: 65 IN | HEART RATE: 117 BPM | SYSTOLIC BLOOD PRESSURE: 101 MMHG | OXYGEN SATURATION: 98 % | BODY MASS INDEX: 31.65 KG/M2

## 2025-04-15 DIAGNOSIS — Z41.9 SURGERY, ELECTIVE: ICD-10-CM

## 2025-04-15 DIAGNOSIS — K91.840: ICD-10-CM

## 2025-04-15 DIAGNOSIS — K91.840: Primary | ICD-10-CM

## 2025-04-15 DIAGNOSIS — R57.8 HEMORRHAGIC SHOCK (MULTI): ICD-10-CM

## 2025-04-15 DIAGNOSIS — N99.820 POSTOPERATIVE VAGINAL BLEEDING FOLLOWING GENITOURINARY PROCEDURE: ICD-10-CM

## 2025-04-15 DIAGNOSIS — N93.9 VAGINAL BLEEDING: Primary | ICD-10-CM

## 2025-04-15 DIAGNOSIS — G89.18 POSTOPERATIVE PAIN: ICD-10-CM

## 2025-04-15 LAB
ABO GROUP (TYPE) IN BLOOD: NORMAL
ANION GAP SERPL CALC-SCNC: 14 MMOL/L (ref 10–20)
ANTIBODY SCREEN: NORMAL
BASOPHILS # BLD AUTO: 0.04 X10*3/UL (ref 0–0.1)
BASOPHILS NFR BLD AUTO: 0.4 %
BUN SERPL-MCNC: 13 MG/DL (ref 6–23)
CALCIUM SERPL-MCNC: 9.2 MG/DL (ref 8.6–10.3)
CHLORIDE SERPL-SCNC: 106 MMOL/L (ref 98–107)
CO2 SERPL-SCNC: 24 MMOL/L (ref 21–32)
CREAT SERPL-MCNC: 0.52 MG/DL (ref 0.5–1.05)
EGFRCR SERPLBLD CKD-EPI 2021: >90 ML/MIN/1.73M*2
EOSINOPHIL # BLD AUTO: 0.19 X10*3/UL (ref 0–0.7)
EOSINOPHIL NFR BLD AUTO: 1.8 %
ERYTHROCYTE [DISTWIDTH] IN BLOOD BY AUTOMATED COUNT: 13.4 % (ref 11.5–14.5)
GLUCOSE SERPL-MCNC: 91 MG/DL (ref 74–99)
HCT VFR BLD AUTO: 38.8 % (ref 36–46)
HGB BLD-MCNC: 12.8 G/DL (ref 12–16)
HOLD SPECIMEN: NORMAL
IMM GRANULOCYTES # BLD AUTO: 0.02 X10*3/UL (ref 0–0.7)
IMM GRANULOCYTES NFR BLD AUTO: 0.2 % (ref 0–0.9)
INR PPP: 1.2 (ref 0.9–1.1)
LYMPHOCYTES # BLD AUTO: 3.35 X10*3/UL (ref 1.2–4.8)
LYMPHOCYTES NFR BLD AUTO: 32.6 %
MCH RBC QN AUTO: 29.1 PG (ref 26–34)
MCHC RBC AUTO-ENTMCNC: 33 G/DL (ref 32–36)
MCV RBC AUTO: 88 FL (ref 80–100)
MONOCYTES # BLD AUTO: 0.68 X10*3/UL (ref 0.1–1)
MONOCYTES NFR BLD AUTO: 6.6 %
NEUTROPHILS # BLD AUTO: 6 X10*3/UL (ref 1.2–7.7)
NEUTROPHILS NFR BLD AUTO: 58.4 %
NRBC BLD-RTO: 0 /100 WBCS (ref 0–0)
PLATELET # BLD AUTO: 315 X10*3/UL (ref 150–450)
POTASSIUM SERPL-SCNC: 4.4 MMOL/L (ref 3.5–5.3)
PROTHROMBIN TIME: 13.2 SECONDS (ref 9.8–12.4)
RBC # BLD AUTO: 4.4 X10*6/UL (ref 4–5.2)
RH FACTOR (ANTIGEN D): NORMAL
SODIUM SERPL-SCNC: 140 MMOL/L (ref 136–145)
WBC # BLD AUTO: 10.3 X10*3/UL (ref 4.4–11.3)

## 2025-04-15 PROCEDURE — 96374 THER/PROPH/DIAG INJ IV PUSH: CPT | Mod: 59

## 2025-04-15 PROCEDURE — 57410 PELVIC EXAMINATION: CPT | Performed by: OBSTETRICS & GYNECOLOGY

## 2025-04-15 PROCEDURE — 7100000001 HC RECOVERY ROOM TIME - INITIAL BASE CHARGE: Performed by: OBSTETRICS & GYNECOLOGY

## 2025-04-15 PROCEDURE — 3700000002 HC GENERAL ANESTHESIA TIME - EACH INCREMENTAL 1 MINUTE: Performed by: OBSTETRICS & GYNECOLOGY

## 2025-04-15 PROCEDURE — 2500000001 HC RX 250 WO HCPCS SELF ADMINISTERED DRUGS (ALT 637 FOR MEDICARE OP): Performed by: STUDENT IN AN ORGANIZED HEALTH CARE EDUCATION/TRAINING PROGRAM

## 2025-04-15 PROCEDURE — 2500000005 HC RX 250 GENERAL PHARMACY W/O HCPCS

## 2025-04-15 PROCEDURE — 7100000010 HC PHASE TWO TIME - EACH INCREMENTAL 1 MINUTE: Performed by: OBSTETRICS & GYNECOLOGY

## 2025-04-15 PROCEDURE — 85610 PROTHROMBIN TIME: CPT | Performed by: NURSE PRACTITIONER

## 2025-04-15 PROCEDURE — 3700000001 HC GENERAL ANESTHESIA TIME - INITIAL BASE CHARGE: Performed by: OBSTETRICS & GYNECOLOGY

## 2025-04-15 PROCEDURE — 7100000009 HC PHASE TWO TIME - INITIAL BASE CHARGE: Performed by: OBSTETRICS & GYNECOLOGY

## 2025-04-15 PROCEDURE — 80048 BASIC METABOLIC PNL TOTAL CA: CPT | Performed by: NURSE PRACTITIONER

## 2025-04-15 PROCEDURE — 7100000002 HC RECOVERY ROOM TIME - EACH INCREMENTAL 1 MINUTE: Performed by: OBSTETRICS & GYNECOLOGY

## 2025-04-15 PROCEDURE — 2500000004 HC RX 250 GENERAL PHARMACY W/ HCPCS (ALT 636 FOR OP/ED)

## 2025-04-15 PROCEDURE — 2500000004 HC RX 250 GENERAL PHARMACY W/ HCPCS (ALT 636 FOR OP/ED): Performed by: OBSTETRICS & GYNECOLOGY

## 2025-04-15 PROCEDURE — 3600000003 HC OR TIME - INITIAL BASE CHARGE - PROCEDURE LEVEL THREE: Performed by: OBSTETRICS & GYNECOLOGY

## 2025-04-15 PROCEDURE — 86923 COMPATIBILITY TEST ELECTRIC: CPT

## 2025-04-15 PROCEDURE — 85025 COMPLETE CBC W/AUTO DIFF WBC: CPT | Performed by: NURSE PRACTITIONER

## 2025-04-15 PROCEDURE — 3600000008 HC OR TIME - EACH INCREMENTAL 1 MINUTE - PROCEDURE LEVEL THREE: Performed by: OBSTETRICS & GYNECOLOGY

## 2025-04-15 PROCEDURE — 86901 BLOOD TYPING SEROLOGIC RH(D): CPT | Performed by: NURSE PRACTITIONER

## 2025-04-15 PROCEDURE — 99291 CRITICAL CARE FIRST HOUR: CPT | Mod: 25 | Performed by: EMERGENCY MEDICINE

## 2025-04-15 PROCEDURE — 96375 TX/PRO/DX INJ NEW DRUG ADDON: CPT | Mod: 59

## 2025-04-15 PROCEDURE — 36415 COLL VENOUS BLD VENIPUNCTURE: CPT | Performed by: NURSE PRACTITIONER

## 2025-04-15 RX ORDER — ONDANSETRON HYDROCHLORIDE 2 MG/ML
INJECTION, SOLUTION INTRAVENOUS
Status: COMPLETED
Start: 2025-04-15 | End: 2025-04-15

## 2025-04-15 RX ORDER — FENTANYL CITRATE 50 UG/ML
50 INJECTION, SOLUTION INTRAMUSCULAR; INTRAVENOUS EVERY 5 MIN PRN
Status: DISCONTINUED | OUTPATIENT
Start: 2025-04-15 | End: 2025-04-15 | Stop reason: HOSPADM

## 2025-04-15 RX ORDER — MORPHINE SULFATE 4 MG/ML
INJECTION, SOLUTION INTRAMUSCULAR; INTRAVENOUS
Status: COMPLETED
Start: 2025-04-15 | End: 2025-04-15

## 2025-04-15 RX ORDER — CEFAZOLIN SODIUM 2 G/100ML
2 INJECTION, SOLUTION INTRAVENOUS ONCE
Status: CANCELLED | OUTPATIENT
Start: 2025-04-15 | End: 2025-04-15

## 2025-04-15 RX ORDER — NAPROXEN 500 MG/1
500 TABLET ORAL 2 TIMES DAILY
Qty: 6 TABLET | Refills: 0 | Status: SHIPPED | OUTPATIENT
Start: 2025-04-15 | End: 2025-04-18

## 2025-04-15 RX ORDER — CEFAZOLIN SODIUM 2 G/100ML
2 INJECTION, SOLUTION INTRAVENOUS ONCE
Status: COMPLETED | OUTPATIENT
Start: 2025-04-15 | End: 2025-04-15

## 2025-04-15 RX ORDER — MEPERIDINE HYDROCHLORIDE 25 MG/ML
12.5 INJECTION INTRAMUSCULAR; INTRAVENOUS; SUBCUTANEOUS EVERY 10 MIN PRN
Status: DISCONTINUED | OUTPATIENT
Start: 2025-04-15 | End: 2025-04-15 | Stop reason: HOSPADM

## 2025-04-15 RX ORDER — SODIUM CHLORIDE, SODIUM LACTATE, POTASSIUM CHLORIDE, CALCIUM CHLORIDE 600; 310; 30; 20 MG/100ML; MG/100ML; MG/100ML; MG/100ML
INJECTION, SOLUTION INTRAVENOUS CONTINUOUS PRN
Status: DISCONTINUED | OUTPATIENT
Start: 2025-04-15 | End: 2025-04-15

## 2025-04-15 RX ORDER — PROPOFOL 10 MG/ML
INJECTION, EMULSION INTRAVENOUS AS NEEDED
Status: DISCONTINUED | OUTPATIENT
Start: 2025-04-15 | End: 2025-04-15

## 2025-04-15 RX ORDER — TRANEXAMIC ACID 100 MG/ML
INJECTION, SOLUTION INTRAVENOUS
Status: DISCONTINUED
Start: 2025-04-15 | End: 2025-04-15 | Stop reason: HOSPADM

## 2025-04-15 RX ORDER — FAMOTIDINE 10 MG/ML
INJECTION INTRAVENOUS AS NEEDED
Status: DISCONTINUED | OUTPATIENT
Start: 2025-04-15 | End: 2025-04-15

## 2025-04-15 RX ORDER — ONDANSETRON HYDROCHLORIDE 2 MG/ML
INJECTION, SOLUTION INTRAVENOUS AS NEEDED
Status: DISCONTINUED | OUTPATIENT
Start: 2025-04-15 | End: 2025-04-15

## 2025-04-15 RX ORDER — DOCUSATE SODIUM 100 MG/1
100 CAPSULE, LIQUID FILLED ORAL 2 TIMES DAILY
Qty: 60 CAPSULE | Refills: 0 | Status: SHIPPED | OUTPATIENT
Start: 2025-04-15 | End: 2025-05-15

## 2025-04-15 RX ORDER — SUCCINYLCHOLINE CHLORIDE 100 MG/5ML
SYRINGE (ML) INTRAVENOUS AS NEEDED
Status: DISCONTINUED | OUTPATIENT
Start: 2025-04-15 | End: 2025-04-15

## 2025-04-15 RX ORDER — LABETALOL HYDROCHLORIDE 5 MG/ML
5 INJECTION, SOLUTION INTRAVENOUS ONCE AS NEEDED
Status: DISCONTINUED | OUTPATIENT
Start: 2025-04-15 | End: 2025-04-15 | Stop reason: HOSPADM

## 2025-04-15 RX ORDER — DROPERIDOL 2.5 MG/ML
0.62 INJECTION, SOLUTION INTRAMUSCULAR; INTRAVENOUS ONCE AS NEEDED
Status: DISCONTINUED | OUTPATIENT
Start: 2025-04-15 | End: 2025-04-15 | Stop reason: HOSPADM

## 2025-04-15 RX ORDER — TRANEXAMIC ACID 100 MG/ML
1000 INJECTION, SOLUTION INTRAVENOUS ONCE
Status: DISCONTINUED | OUTPATIENT
Start: 2025-04-15 | End: 2025-04-15

## 2025-04-15 RX ORDER — DIPHENHYDRAMINE HYDROCHLORIDE 50 MG/ML
INJECTION, SOLUTION INTRAMUSCULAR; INTRAVENOUS AS NEEDED
Status: DISCONTINUED | OUTPATIENT
Start: 2025-04-15 | End: 2025-04-15

## 2025-04-15 RX ORDER — LIDOCAINE HYDROCHLORIDE 20 MG/ML
INJECTION, SOLUTION INFILTRATION; PERINEURAL AS NEEDED
Status: DISCONTINUED | OUTPATIENT
Start: 2025-04-15 | End: 2025-04-15

## 2025-04-15 RX ORDER — MORPHINE SULFATE 4 MG/ML
4 INJECTION, SOLUTION INTRAMUSCULAR; INTRAVENOUS ONCE
Status: COMPLETED | OUTPATIENT
Start: 2025-04-15 | End: 2025-04-15

## 2025-04-15 RX ORDER — ACETAMINOPHEN 500 MG
1000 TABLET ORAL EVERY 6 HOURS
Qty: 24 TABLET | Refills: 0 | Status: SHIPPED | OUTPATIENT
Start: 2025-04-15 | End: 2025-04-18

## 2025-04-15 RX ORDER — LIDOCAINE HYDROCHLORIDE 10 MG/ML
0.1 INJECTION, SOLUTION EPIDURAL; INFILTRATION; INTRACAUDAL; PERINEURAL ONCE
Status: DISCONTINUED | OUTPATIENT
Start: 2025-04-15 | End: 2025-04-15 | Stop reason: HOSPADM

## 2025-04-15 RX ORDER — ONDANSETRON HYDROCHLORIDE 2 MG/ML
4 INJECTION, SOLUTION INTRAVENOUS ONCE
Status: COMPLETED | OUTPATIENT
Start: 2025-04-15 | End: 2025-04-15

## 2025-04-15 RX ORDER — OXYCODONE HYDROCHLORIDE 5 MG/1
5 TABLET ORAL EVERY 4 HOURS PRN
Status: DISCONTINUED | OUTPATIENT
Start: 2025-04-15 | End: 2025-04-15 | Stop reason: HOSPADM

## 2025-04-15 RX ORDER — TRANEXAMIC ACID 10 MG/ML
1000 INJECTION, SOLUTION INTRAVENOUS ONCE
Status: DISCONTINUED | OUTPATIENT
Start: 2025-04-15 | End: 2025-04-15 | Stop reason: HOSPADM

## 2025-04-15 RX ORDER — SODIUM CHLORIDE, SODIUM LACTATE, POTASSIUM CHLORIDE, CALCIUM CHLORIDE 600; 310; 30; 20 MG/100ML; MG/100ML; MG/100ML; MG/100ML
100 INJECTION, SOLUTION INTRAVENOUS CONTINUOUS
Status: CANCELLED | OUTPATIENT
Start: 2025-04-15 | End: 2025-04-16

## 2025-04-15 RX ORDER — MIDAZOLAM HYDROCHLORIDE 1 MG/ML
INJECTION, SOLUTION INTRAMUSCULAR; INTRAVENOUS AS NEEDED
Status: DISCONTINUED | OUTPATIENT
Start: 2025-04-15 | End: 2025-04-15

## 2025-04-15 RX ORDER — FENTANYL CITRATE 50 UG/ML
INJECTION, SOLUTION INTRAMUSCULAR; INTRAVENOUS AS NEEDED
Status: DISCONTINUED | OUTPATIENT
Start: 2025-04-15 | End: 2025-04-15

## 2025-04-15 RX ORDER — DIPHENHYDRAMINE HYDROCHLORIDE 50 MG/ML
12.5 INJECTION, SOLUTION INTRAMUSCULAR; INTRAVENOUS ONCE AS NEEDED
Status: DISCONTINUED | OUTPATIENT
Start: 2025-04-15 | End: 2025-04-15 | Stop reason: HOSPADM

## 2025-04-15 RX ADMIN — MORPHINE SULFATE 4 MG: 4 INJECTION, SOLUTION INTRAMUSCULAR; INTRAVENOUS at 11:24

## 2025-04-15 RX ADMIN — MIDAZOLAM 2 MG: 1 INJECTION INTRAMUSCULAR; INTRAVENOUS at 12:34

## 2025-04-15 RX ADMIN — LIDOCAINE HYDROCHLORIDE 100 MG: 20 INJECTION, SOLUTION INFILTRATION; PERINEURAL at 12:38

## 2025-04-15 RX ADMIN — DIPHENHYDRAMINE HYDROCHLORIDE 12.5 MG: 50 INJECTION INTRAMUSCULAR; INTRAVENOUS at 12:50

## 2025-04-15 RX ADMIN — FENTANYL CITRATE 50 MCG: 50 INJECTION, SOLUTION INTRAMUSCULAR; INTRAVENOUS at 12:38

## 2025-04-15 RX ADMIN — CEFAZOLIN SODIUM 2 G: 2 INJECTION, SOLUTION INTRAVENOUS at 12:43

## 2025-04-15 RX ADMIN — TRANEXAMIC ACID 1000 MG: 100 INJECTION INTRAVENOUS at 11:29

## 2025-04-15 RX ADMIN — FAMOTIDINE 20 MG: 10 INJECTION, SOLUTION INTRAVENOUS at 12:34

## 2025-04-15 RX ADMIN — FENTANYL CITRATE 50 MCG: 50 INJECTION, SOLUTION INTRAMUSCULAR; INTRAVENOUS at 12:52

## 2025-04-15 RX ADMIN — SODIUM CHLORIDE, POTASSIUM CHLORIDE, SODIUM LACTATE AND CALCIUM CHLORIDE: 600; 310; 30; 20 INJECTION, SOLUTION INTRAVENOUS at 12:34

## 2025-04-15 RX ADMIN — PROPOFOL 200 MG: 10 INJECTION, EMULSION INTRAVENOUS at 12:38

## 2025-04-15 RX ADMIN — OXYCODONE HYDROCHLORIDE 5 MG: 5 TABLET ORAL at 14:29

## 2025-04-15 RX ADMIN — Medication 120 MG: at 12:38

## 2025-04-15 RX ADMIN — DEXAMETHASONE SODIUM PHOSPHATE 8 MG: 4 INJECTION, SOLUTION INTRAMUSCULAR; INTRAVENOUS at 12:50

## 2025-04-15 RX ADMIN — ONDANSETRON 4 MG: 2 INJECTION INTRAMUSCULAR; INTRAVENOUS at 11:23

## 2025-04-15 RX ADMIN — ONDANSETRON 4 MG: 2 INJECTION, SOLUTION INTRAMUSCULAR; INTRAVENOUS at 12:50

## 2025-04-15 RX ADMIN — ONDANSETRON HYDROCHLORIDE 4 MG: 2 INJECTION, SOLUTION INTRAVENOUS at 11:23

## 2025-04-15 RX ADMIN — TRANEXAMIC ACID 1000 MG: 100 INJECTION, SOLUTION INTRAVENOUS at 11:29

## 2025-04-15 ASSESSMENT — PAIN SCALES - GENERAL
PAINLEVEL_OUTOF10: 0 - NO PAIN
PAINLEVEL_OUTOF10: 8
PAINLEVEL_OUTOF10: 8
PAINLEVEL_OUTOF10: 0 - NO PAIN
PAINLEVEL_OUTOF10: 8
PAINLEVEL_OUTOF10: 6
PAINLEVEL_OUTOF10: 5 - MODERATE PAIN
PAINLEVEL_OUTOF10: 5 - MODERATE PAIN
PAINLEVEL_OUTOF10: 8
PAINLEVEL_OUTOF10: 5 - MODERATE PAIN

## 2025-04-15 ASSESSMENT — PAIN - FUNCTIONAL ASSESSMENT
PAIN_FUNCTIONAL_ASSESSMENT: 0-10

## 2025-04-15 ASSESSMENT — PAIN DESCRIPTION - DESCRIPTORS
DESCRIPTORS: BURNING

## 2025-04-15 ASSESSMENT — COLUMBIA-SUICIDE SEVERITY RATING SCALE - C-SSRS
2. HAVE YOU ACTUALLY HAD ANY THOUGHTS OF KILLING YOURSELF?: NO
6. HAVE YOU EVER DONE ANYTHING, STARTED TO DO ANYTHING, OR PREPARED TO DO ANYTHING TO END YOUR LIFE?: NO
1. IN THE PAST MONTH, HAVE YOU WISHED YOU WERE DEAD OR WISHED YOU COULD GO TO SLEEP AND NOT WAKE UP?: NO

## 2025-04-15 ASSESSMENT — LIFESTYLE VARIABLES
HAVE PEOPLE ANNOYED YOU BY CRITICIZING YOUR DRINKING: NO
EVER HAD A DRINK FIRST THING IN THE MORNING TO STEADY YOUR NERVES TO GET RID OF A HANGOVER: NO
HAVE YOU EVER FELT YOU SHOULD CUT DOWN ON YOUR DRINKING: NO
EVER FELT BAD OR GUILTY ABOUT YOUR DRINKING: NO
TOTAL SCORE: 0

## 2025-04-15 NOTE — OP NOTE
Date: 4/15/2025  OR Location: [facility]    Name: Rosalva Pineda, : 1995, Age: 29 y.o., MRN: [id number], Sex: female    Diagnosis    Pre-op Diagnosi Post-op Diagnosis  Normal cuff healing with vaginal bleeding       Procedures  EXAM UNDER ANESTHESIA, GYNECOLOGIC  25596 - SD PELVIC EXAMINATION W/ANESTHESIA OTHER THAN LOCAL        Surgeons      * Imer Merino - Primary    Resident/Fellow/Other Assistant:  Surgeons and Role:     * Doreen Lozada PA-C - Assisting    Procedure Summary  Anesthesia: General  ASA: II  Anesthesia Staff: Anesthesiologist: Vazquez Miller DO  CRNA: Sarah Orozco APRN-CRNA, DNAP    Estimated Blood Loss: < 50 mL     Findings: Raw area 3 mm left side pelvis intact no discharge minimal active bleeding  Specimens: None  Procedure:    Patient was taken the operating room performed consent was obtained.  She is placed supine position general anesthesia was started  Timeout was performed.  2 g Ancef was given. patient was placed prepped and draped in normal sterile fashion, in lithotomy position yellowfin stirrups.    Bladder was drained with straight cath    Speculum placed in vagina cuff was inspected completely intact bimanual did not reveal any masses or focal areas of the cuff.  Small amount of bleeding was noted from the right side.  A figure-of-eight suture was placed here middle and left side of vaginal cuff to reclose the intact cuff.  The vaginal cuff was rechecked.  no evidence of bleeding. procedure was then ended anesthesia was stopped counts were reported as correct x2 pt transferred to PACU in stable condition.      Imer Mernio MD

## 2025-04-15 NOTE — ED PROCEDURE NOTE
Procedure  Critical Care    Performed by: Basil WILLS MD  Authorized by: Basil WILLS MD    Critical care provider statement:     Critical care time (minutes):  45    Critical care time was exclusive of:  Separately billable procedures and treating other patients    Critical care was necessary to treat or prevent imminent or life-threatening deterioration of the following conditions:  Shock (post-op hemorrhage)    Critical care was time spent personally by me on the following activities:  Blood draw for specimens, discussions with primary provider, development of treatment plan with patient or surrogate, examination of patient, evaluation of patient's response to treatment, obtaining history from patient or surrogate, ordering and performing treatments and interventions, ordering and review of laboratory studies, pulse oximetry and re-evaluation of patient's condition    Care discussed with: admitting provider                 Basil WILLS MD  04/15/25 4827

## 2025-04-15 NOTE — DISCHARGE INSTRUCTIONS
Continue no heavy lifting nothing inside the vagina till 6 weeks from prior surgery.  No separate restrictions here off work till seen in office on Thursday.  May resume breast-feeding immediately as needed   Physician phone number provided to patient  General Anesthesia Discharge Instructions    About this topic  You may need general anesthesia if you need to be asleep during a procedure. Your doctor will use drugs to block the signals that go from your nerves to your brain. Doctors give general anesthesia during a surgery or procedure to:  Allow you to sleep  Help your body be still  Relax your muscles  Help you to relax and be pain free  Keep you from remembering the surgery  Let the doctor manage your airway, breathing, and blood flow  The doctor or nurse anesthetist gives general anesthesia by a shot into your vein. Sometimes, you may breathe in a gas through a mask placed over your face.  What care is needed at home?  Ask your doctor what you need to do when you go home. Make sure you ask questions if you do not understand what the doctor says.  Your doctor may give you drugs to prevent or treat an upset stomach from the anesthetic. Take them as ordered.  If your throat is sore, suck on ice chips or popsicles to ease throat pain.  Put 2 to 3 pillows under your head and back when you lie down to help you breathe easier.  For the first 24 to 48 hours:  Do not operate heavy or dangerous machinery.  Do not make major decisions or sign important papers. You may not be able to think clearly.  Avoid beer, wine, or mixed drinks.  You are at a higher risk of falling for at least 24 hours after general anesthesia.  Take extra care when you get up.  Do not change positions quickly.  Do not rush when you need to go to the bathroom or to answer the phone.  Ask for help if you feel unsteady when you try to walk.  Wear shoes with non-slip soles and low heels.  What follow-up care is needed?  Your doctor may ask you to come  back to the office to check on your progress. Be sure to keep these visits.  If you have stitches that do not dissolve or staples, you will need to have them removed. Your doctor will want to do this in 1 to 2 weeks. If the doctor used skin glue, the glue will fall off on its own.  What drugs may be needed?  The doctor may order drugs to:  Help with pain  Treat an upset stomach or throwing up  Will physical activity be limited?  You will not be allowed to drive right away after the procedure. Ask a family member or a friend to drive you home.  Avoid trying to get out of bed without help until you are sure of your balance.  You may have to limit your activity. Talk to your doctor about if you need to limit how much you lift or limit exercise after your procedure.  What changes to diet are needed?  Start with a light diet when you are fully awake. This includes things that are easy to swallow like soups, pudding, jello, toast, and eggs. Slowly progress to your normal diet.  What problems could happen?  Low blood pressure  Breathing problems  Upset stomach or throwing up  Dizziness  Blood clots  Infection  When do I need to call the doctor?  Trouble breathing  Upset stomach or throwing up more than 3 times in the next 2 days  Dizziness  Teach Back: Helping You Understand  The Teach Back Method helps you understand the information we are giving you. After you talk with the staff, tell them in your own words what you learned. This helps to make sure the staff has described each thing clearly. It also helps to explain things that may have been confusing. Before going home, make sure you can do these:  I can tell you about my procedure.  I can tell you if I need to follow up with my doctor.  I can tell you what is good for me to eat and drink the next day.  I can tell you what I would do if I have trouble breathing, an upset stomach, or dizziness.  Where can I learn more?  [employer] [place] [employer]  https://www.[facility].nih.gov/education/pages/factsheet_Anesthesia.aspx  NHS Choices  http://www.nhs.uk/conditions/Anaesthetic-general/Pages/Definition.aspx  Last Reviewed Date  2020-04-22

## 2025-04-15 NOTE — ANESTHESIA PROCEDURE NOTES
Airway  Date/Time: 4/15/2025 12:41 PM  Urgency: emergent    Airway not difficult    Staffing  Performed: SRNA   Authorized by: Vazquez Miller DO    Performed by: KUN Jin-CRNA, BRYCE  Patient location during procedure: OR    Consent for Airway (if performed for an anesthetic, see related documentation for consents)  Patient identity confirmed: verbally with patient  Consent: The procedure was performed in an emergent situation. Verbal consent obtained.  Risks and benefits: risks, benefits and alternatives were discussed  Consent given by: patient      Indications and Patient Condition  Indications for airway management: anesthesia and airway protection  Spontaneous ventilation: present  Sedation level: minimal  Preoxygenated: yes  Patient position: sniffing  Mask difficulty assessment: 0 - not attempted (RSI)  Planned trial extubation    Final Airway Details  Final airway type: endotracheal airway      Successful airway: ETT  Cuffed: yes   Successful intubation technique: direct laryngoscopy  Facilitating devices/methods: intubating stylet  Endotracheal tube insertion site: oral  Blade: Jackson  Blade size: #3  ETT size (mm): 7.0  Cormack-Lehane Classification: grade IIa - partial view of glottis  Placement verified by: capnometry   Measured from: teeth  Number of attempts at approach: 1

## 2025-04-15 NOTE — ANESTHESIA PREPROCEDURE EVALUATION
Patient: Rosalva Pineda    Procedure Information       Date/Time: 04/15/25 1245    Procedure: EXAM UNDER ANESTHESIA, GYNECOLOGIC    Location: [facility] / [facility]    Surgeons: mIer Merino MD            Relevant Problems   Neuro   (+) Anxiety and depression      GI   (+) Intraluminal bleeding from anastomosis of duodenal cuff not requiring transfusion      Endocrine   (+) Class 1 obesity in adult      GYN   (+) Abnormal uterine bleeding (AUB)       Clinical information reviewed:   Tobacco  Allergies    Med Hx  Surg Hx   Fam Hx  Soc Hx        NPO Detail:  No data recorded     Physical Exam    Airway  Mallampati: II     Cardiovascular   Rhythm: regular  Rate: normal     Dental    Pulmonary - normal exam     Abdominal - normal exam             Anesthesia Plan    History of general anesthesia?: yes  History of complications of general anesthesia?: no    ASA 2 - emergent     general     intravenous induction   Postoperative administration of opioids is intended.  Anesthetic plan and risks discussed with patient.

## 2025-04-15 NOTE — ED PROVIDER NOTES
"HPI   Chief Complaint   Patient presents with    Post-op Problem     \"Had hysterectomy 3 weeks ago and last night had bleeding when stood up and it was new.  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  Passing huge clots an bleeding.  Hurts when they pass.  Dr. Merino knows and is in surgery and will be coming down.\"       29-year-old female presents the emergency department by EMS, states 3 weeks ago she had a hysterectomy performed, states had minimal bleeding afterwards, last night she passed a very large blood clot.  States she called the office and they told her to monitor.    This morning states passed another very large blood clot, continuing to bleed so called 911.    Denies any heavy lifting, states no vaginal intercourse      History provided by:  Patient   used: No            Patient History   Past Medical History:   Diagnosis Date    Abnormal uterine bleeding (AUB)     Anxiety     COVID-19     NOT VACCINATED    Depression     Elevated blood pressure reading     Migraine     Postoperative wound infection 10/26/24     Past Surgical History:   Procedure Laterality Date    APPENDECTOMY  10/24/2024     SECTION, LOW TRANSVERSE N/A 10/19/2023    LAPAROSCOPIC HYSTERECTOMY  2025    PELVIC LAPAROSCOPY  2024    IUD ressection after abnormal migration    SALPINGECTOMY Bilateral 2025    TONSILECTOMY, ADENOIDECTOMY, BILATERAL MYRINGOTOMY AND TUBES      URETHRAL SLING  2025    suburethreal sling     Family History   Problem Relation Name Age of Onset    Other (cardiac disorder) Mother Natalie     Hypertension Mother Natalie     Other (mental disorder) Mother Natalie     Thyroid disease Mother Natalie     Diabetes Father Rober     Hypertension Father Rober     Asthma Sister Nia Pineda     Immunodeficiency Sister Nia Pineda     Other (cardiac disease) Paternal Grandmother Samina     Stroke Paternal Grandmother Samina     Asthma Sister " Nia      Social History     Tobacco Use    Smoking status: Never    Smokeless tobacco: Never   Vaping Use    Vaping status: Never Used   Substance Use Topics    Alcohol use: Not Currently    Drug use: Never       Physical Exam   ED Triage Vitals [04/15/25 1053]   Temperature Heart Rate Respirations BP   36.6 °C (97.9 °F) (!) 108 18 (!) 140/96      SpO2 Temp Source Heart Rate Source Patient Position   -- Temporal Monitor Sitting      BP Location FiO2 (%)     Right arm --       Physical Exam  General: Vitals noted, no distress. Afebrile.  Cardiac: Regular rate and rhythm, no murmur, no gallop  Pulmonary: Lungs clear bilaterally with good aeration. No adventitious breath sounds.  Abdomen: Soft, nontender, nonsurgical. No peritoneal signs. Normoactive bowel sounds  Pelvic Exam: Speculum exam shows vaginal hemorrhage, unable to identify the source of bleeding to the extensive blee active bleeding.  Extremities: No peripheral edema. Negative Homans bilaterally, no cords.   Skin:  No rash.  Neuro: No focal neurologic deficits, NIH score of 0.    ED Course & MDM   Diagnoses as of 04/15/25 1207   Vaginal bleeding   Postoperative vaginal bleeding following genitourinary procedure   Hemorrhagic shock (Multi)          Labs Reviewed   PROTIME-INR - Abnormal       Result Value    Protime 13.2 (*)     INR 1.2 (*)    BASIC METABOLIC PANEL - Normal    Glucose 91      Sodium 140      Potassium 4.4      Chloride 106      Bicarbonate 24      Anion Gap 14      Urea Nitrogen 13      Creatinine 0.52      eGFR >90      Calcium 9.2     CBC WITH AUTO DIFFERENTIAL    WBC 10.3      nRBC 0.0      RBC 4.40      Hemoglobin 12.8      Hematocrit 38.8      MCV 88      MCH 29.1      MCHC 33.0      RDW 13.4      Platelets 315      Neutrophils % 58.4      Immature Granulocytes %, Automated 0.2      Lymphocytes % 32.6      Monocytes % 6.6      Eosinophils % 1.8      Basophils % 0.4      Neutrophils Absolute 6.00      Immature Granulocytes Absolute,  Automated 0.02      Lymphocytes Absolute 3.35      Monocytes Absolute 0.68      Eosinophils Absolute 0.19      Basophils Absolute 0.04     TYPE AND SCREEN    ABO TYPE A      Rh TYPE POS      ANTIBODY SCREEN NEG     POCT PREGNANCY, URINE   PREPARE RBC    PRODUCT CODE M8920V39      Unit Number [id number]      Unit ABO A      Unit RH POS      XM INTEP COMP      Dispense Status XM      Blood Expiration Date 4/30/2025 11:59:00 PM EDT      PRODUCT BLOOD TYPE 6200      UNIT VOLUME 350      PRODUCT CODE D2639Z67      Unit Number [id number]      Unit ABO A      Unit RH POS      XM INTEP COMP      Dispense Status XM      Blood Expiration Date 4/30/2025 11:59:00 PM EDT      PRODUCT BLOOD TYPE 6200      UNIT VOLUME 350          No orders to display              No data recorded     [place] Coma Scale Score: 15 (04/15/25 1145 : Alisia Bautista RN)                   Medical Decision Making    Patient presents with evidence of vaginal hemorrhage, 3 weeks postop from hysterectomy.    Speculum exam is very limited due to the significant amount of bleeding.  Immediately ordered a gram of TXA, saline soaked Kerlix was packed into the vaginal cavity.  Patient experienced pain so was given 4 mg of IV morphine, 4 mg of IV Zofran.    I did immediately reach out to Dr. Merino, plan to take the patient to the OR for exam    Ordered type and screen, 2 units of blood crossmatched as needed.    Bleeding seem to have slowed with vaginal packing.    Signs closely monitored.    Or team down to take the patient up to OR for thorough exam      Shared CORINNE Attestation:    I personally saw the patient and made/approved the management plan and take responsibility for the patient management.    History: Patient presenting with postoperative heavy vaginal bleeding.    Exam: Tachycardic but regular rhythm cardiac exam with clear breath sounds bilaterally.  Abdomen is soft and nontender.  There is heavy vaginal bleeding with clots.    MDM:      Differential Diagnosis: Postop bleeding, postop complication, blood loss anemia, hemorrhagic shock    Labs Reviewed   PROTIME-INR - Abnormal       Result Value    Protime 13.2 (*)     INR 1.2 (*)    BASIC METABOLIC PANEL - Normal    Glucose 91      Sodium 140      Potassium 4.4      Chloride 106      Bicarbonate 24      Anion Gap 14      Urea Nitrogen 13      Creatinine 0.52      eGFR >90      Calcium 9.2     CBC WITH AUTO DIFFERENTIAL    WBC 10.3      nRBC 0.0      RBC 4.40      Hemoglobin 12.8      Hematocrit 38.8      MCV 88      MCH 29.1      MCHC 33.0      RDW 13.4      Platelets 315      Neutrophils % 58.4      Immature Granulocytes %, Automated 0.2      Lymphocytes % 32.6      Monocytes % 6.6      Eosinophils % 1.8      Basophils % 0.4      Neutrophils Absolute 6.00      Immature Granulocytes Absolute, Automated 0.02      Lymphocytes Absolute 3.35      Monocytes Absolute 0.68      Eosinophils Absolute 0.19      Basophils Absolute 0.04     TYPE AND SCREEN    ABO TYPE A      Rh TYPE POS      ANTIBODY SCREEN NEG     PREPARE RBC    PRODUCT CODE F1253O49      Unit Number [id number]      Unit ABO A      Unit RH POS      XM INTEP COMP      Dispense Status XM      Blood Expiration Date 4/30/2025 11:59:00 PM EDT      PRODUCT BLOOD TYPE 6200      UNIT VOLUME 350      PRODUCT CODE M2421B48      Unit Number [id number]      Unit ABO A      Unit RH POS      XM INTEP COMP      Dispense Status XM      Blood Expiration Date 4/30/2025 11:59:00 PM EDT      PRODUCT BLOOD TYPE 6200      UNIT VOLUME 350         No orders to display     I personally packed the vagina with Curlex in order to provide pressure dressing to slow the bleeding.  TXA was also administered.  Patient's vitals were supported with IV normal saline.  Type and cross was ordered.  The patient will be taken to the OR for exam under anesthesia.      Basil Castillo MD    Procedure  Procedures     Sabine Guzman, APRN-CNP  04/15/25 1354

## 2025-04-15 NOTE — ANESTHESIA POSTPROCEDURE EVALUATION
Patient: Rosalva Pineda    Procedure Summary       Date: 04/15/25 Room / Location: [facility]    Anesthesia Start: 1234 Anesthesia Stop: 1312    Procedure: EXAM UNDER ANESTHESIA, GYNECOLOGIC Diagnosis:       Intraluminal bleeding from anastomosis of duodenal cuff not requiring transfusion      (Intraluminal bleeding from anastomosis of duodenal cuff not requiring transfusion [K91.840])    Surgeons: Imer Merino MD Responsible Provider: Vazquez Miller DO    Anesthesia Type: general ASA Status: 2 - Emergent            Anesthesia Type: general    Vitals Value Taken Time   BP 98/74 04/15/25 1310   Temp 36.2 04/15/25 1312   Pulse 116 04/15/25 1311   Resp 6 04/15/25 1311   SpO2 99 % 04/15/25 1311   Vitals shown include unfiled device data.    Anesthesia Post Evaluation    Patient location during evaluation: PACU  Patient participation: complete - patient participated  Level of consciousness: awake  Pain management: adequate  Multimodal analgesia pain management approach  Airway patency: patent  Cardiovascular status: acceptable  Respiratory status: acceptable and face mask  Hydration status: acceptable  Postoperative Nausea and Vomiting: none        No notable events documented.

## 2025-04-15 NOTE — TELEPHONE ENCOUNTER
Pt called and was hysterically crying, unable to understand what she was saying, Pt gave the phone to a woman who said she is Rosalvas manager at work. Rosalva verified her name and  with me on speaker phone, Pt is hysterically crying says she is bleeding a lot and passing large blood clots. Pt says she talked to Dr. Merino last night and he told her if she is saturating pads front to back and passing large clots to call his cell phone, but pt didn't want to bother him in while he is in surgery. Pt's manager reports there is blood everywhere in the toilet and bathroom. I advised to call Dr. Merino ASAP if he does not answer to head to [facility]. They asked if they should call a squad. I told them I am not sure how far away they are and how fast they can safely get to the hospital, if they feel it is necessary- they can. Pt does deny feeling lightheaded or dizzy at this time- Pt began yelling to her manager to hang up the phone and call Dr. Merino- line disconnected.

## 2025-04-16 LAB
BLOOD EXPIRATION DATE: NORMAL
BLOOD EXPIRATION DATE: NORMAL
DISPENSE STATUS: NORMAL
DISPENSE STATUS: NORMAL
PRODUCT BLOOD TYPE: 6200
PRODUCT BLOOD TYPE: 6200
PRODUCT CODE: NORMAL
PRODUCT CODE: NORMAL
UNIT ABO: NORMAL
UNIT ABO: NORMAL
UNIT NUMBER: NORMAL
UNIT NUMBER: NORMAL
UNIT RH: NORMAL
UNIT RH: NORMAL
UNIT VOLUME: 350
UNIT VOLUME: 350
XM INTEP: NORMAL
XM INTEP: NORMAL

## 2025-04-17 ENCOUNTER — PATIENT OUTREACH (OUTPATIENT)
Dept: PRIMARY CARE | Facility: CLINIC | Age: 30
End: 2025-04-17

## 2025-04-17 ENCOUNTER — APPOINTMENT (OUTPATIENT)
Dept: OBSTETRICS AND GYNECOLOGY | Facility: CLINIC | Age: 30
End: 2025-04-17
Payer: COMMERCIAL

## 2025-04-17 DIAGNOSIS — N93.9 ABNORMAL UTERINE BLEEDING (AUB): Primary | ICD-10-CM

## 2025-04-17 PROCEDURE — 99024 POSTOP FOLLOW-UP VISIT: CPT | Performed by: OBSTETRICS & GYNECOLOGY

## 2025-04-17 NOTE — PROGRESS NOTES
GYN PROGRESS NOTE          Chief complaint: follow up    HPI:  Patient answers are not available for this visit.  HPI       Post-op     Additional comments: Est pt             Comments    Pt is doing better today afraid that she is going to be bleeding again  Very tired, anxious  Chaperone student Yvette          Juan F edited by Deborah Polanco MA on 4/17/2025  1:30 PM.          Reviewed case with patient, reviewed plans.    Small cuff bleed now resolved    ROS:  GEN - no fevers or chills  RESP - no SOB or cough  GYN - see HPI      HISTORY:  Medical History[1]  Surgical History[2]  Social History     Socioeconomic History    Marital status: Single     Spouse name: Jem Rodriguez    Number of children: Not on file    Years of education: Not on file    Highest education level: Not on file   Occupational History    Occupation: Doctor's    Tobacco Use    Smoking status: Never    Smokeless tobacco: Never   Vaping Use    Vaping status: Never Used   Substance and Sexual Activity    Alcohol use: Not Currently    Drug use: Never    Sexual activity: Yes     Partners: Male     Birth control/protection: Other, None     Comment: Depo injection   Other Topics Concern    Not on file   Social History Narrative    Not on file     Social Drivers of Health     Financial Resource Strain: Low Risk  (10/24/2024)    Overall Financial Resource Strain (CARDIA)     Difficulty of Paying Living Expenses: Not very hard   Food Insecurity: No Food Insecurity (10/24/2024)    Hunger Vital Sign     Worried About Running Out of Food in the Last Year: Never true     Ran Out of Food in the Last Year: Never true   Transportation Needs: No Transportation Needs (10/24/2024)    PRAPARE - Transportation     Lack of Transportation (Medical): No     Lack of Transportation (Non-Medical): No   Physical Activity: Sufficiently Active (10/18/2023)    Exercise Vital Sign     Days of Exercise per Week: 5 days     Minutes of Exercise per Session: 30 min    Stress: No Stress Concern Present (10/24/2024)    [place] [employer] - Occupational Stress Questionnaire     Feeling of Stress : Not at all   Social Connections: Moderately Isolated (10/18/2023)    Social Connection and Isolation Panel [NHANES]     Frequency of Communication with Friends and Family: More than three times a week     Frequency of Social Gatherings with Friends and Family: More than three times a week     Attends Hinduism Services: Never     Active Member of Clubs or Organizations: No     Attends Club or Organization Meetings: Never     Marital Status: Living with partner   Intimate Partner Violence: Not At Risk (10/24/2024)    Humiliation, Afraid, Rape, and Kick questionnaire     Fear of Current or Ex-Partner: No     Emotionally Abused: No     Physically Abused: No     Sexually Abused: No   Housing Stability: Low Risk  (10/24/2024)    Housing Stability Vital Sign     Unable to Pay for Housing in the Last Year: No     Number of Times Moved in the Last Year: 0     Homeless in the Last Year: No     Cancer-related family history is not on file.       PHYSICAL EXAM:  LMP  (LMP Unknown)   GEN:  A&O, NAD  HEENT:  head HC/AT, no visible goiter  PSYCH:  normal affect, non-anxious      IMPRESSION/PLAN:  28yo s/p tlh sling resolved retention, raw area intact cuff s/p hemostasis, now doing well    Follow 2 weeks          Imer Merino MD             [1]   Past Medical History:  Diagnosis Date    Abnormal uterine bleeding (AUB)     Anxiety     COVID-19     NOT VACCINATED    Depression     Elevated blood pressure reading     Migraine     Postoperative wound infection 10/26/24   [2]   Past Surgical History:  Procedure Laterality Date    APPENDECTOMY  10/24/2024     SECTION, LOW TRANSVERSE N/A 10/19/2023    LAPAROSCOPIC HYSTERECTOMY  2025    PELVIC LAPAROSCOPY  2024    IUD ressection after abnormal migration    SALPINGECTOMY Bilateral 2025    TONSILECTOMY,  ADENOIDECTOMY, BILATERAL MYRINGOTOMY AND TUBES      URETHRAL SLING  03/25/2025    suburethreal sling

## 2025-04-17 NOTE — PROGRESS NOTES
Discharge Facility: [facility]  Discharge Diagnosis:   Vaginal bleeding   Postoperative vaginal bleeding following genitourinary procedure   Hemorrhagic shock     Admission Date: 4/15/2025  Discharge Date: 4/15/2025    PCP Appointment Date: 4/18/2025  Specialist Appointment Date:   -ob/gyn 4/17/2025    Hospital Encounter and Summary Linked: Yes  ED to Hosp-Admission (Discharged) with Imer Merino MD; Basil WILLS MD (04/15/2025)     Procedures  EXAM UNDER ANESTHESIA, GYNECOLOGIC  30536 - TX PELVIC EXAMINATION W/ANESTHESIA OTHER THAN LOCAL    Two attempts were made to reach patient within two business days after discharge. Voicemail left with contact information for patient to call back with any non-emergent questions or concerns. Patient is scheduled with PCP for a follow up.

## 2025-04-18 ENCOUNTER — OFFICE VISIT (OUTPATIENT)
Dept: PRIMARY CARE | Facility: CLINIC | Age: 30
End: 2025-04-18
Payer: COMMERCIAL

## 2025-04-18 VITALS
RESPIRATION RATE: 16 BRPM | BODY MASS INDEX: 31.65 KG/M2 | WEIGHT: 190 LBS | HEART RATE: 98 BPM | TEMPERATURE: 97.9 F | DIASTOLIC BLOOD PRESSURE: 72 MMHG | SYSTOLIC BLOOD PRESSURE: 104 MMHG | HEIGHT: 65 IN

## 2025-04-18 DIAGNOSIS — N93.9 VAGINAL BLEEDING: ICD-10-CM

## 2025-04-18 DIAGNOSIS — F41.9 ANXIETY: Primary | ICD-10-CM

## 2025-04-18 PROCEDURE — 1036F TOBACCO NON-USER: CPT | Performed by: FAMILY MEDICINE

## 2025-04-18 PROCEDURE — 99214 OFFICE O/P EST MOD 30 MIN: CPT | Performed by: FAMILY MEDICINE

## 2025-04-18 PROCEDURE — 3008F BODY MASS INDEX DOCD: CPT | Performed by: FAMILY MEDICINE

## 2025-04-18 RX ORDER — ESCITALOPRAM OXALATE 5 MG/1
5 TABLET ORAL DAILY
Qty: 30 TABLET | Refills: 3 | Status: SHIPPED | OUTPATIENT
Start: 2025-04-18

## 2025-04-18 NOTE — PROGRESS NOTES
Subjective   Rosalva Pineda is a 29 y.o. female who presents for ER Follow-up.     HPI         She was in the ER at [facility] on 4/15/25 she underwent a total abdominal hysterectomy 3 weeks ago which was successful but then developed bleeding all of a sudden.  Notes indicate that she had a small tear in the vaginal cuff which was repaired with a single suture and the bleeding was stopped.  She did not have a drop in her hemoglobin and is already followed up with her OB/GYN doctor.  She is feeling significantly better but is extremely anxious and stressed.  She feels that the worry and stress with this complication have pushed her over the edge and she has difficulty caring for her family and performing duties at work.  We had previously discussed treating anxiety with medication and she would like to consider this today.  She is pursuing counseling already    Visit Vitals  /72   Pulse 98   Temp 36.6 °C (97.9 °F)   Resp 16      Objective   Physical Exam  Constitutional:       Appearance: Normal appearance. She is obese.   HENT:      Right Ear: External ear normal.      Left Ear: External ear normal.      Nose: Congestion present.      Mouth/Throat:      Mouth: Mucous membranes are moist.      Pharynx: Oropharynx is clear.   Eyes:      Conjunctiva/sclera: Conjunctivae normal.   Cardiovascular:      Rate and Rhythm: Normal rate and regular rhythm.      Pulses: Normal pulses.      Heart sounds: Normal heart sounds.   Pulmonary:      Effort: Pulmonary effort is normal.      Breath sounds: Normal breath sounds.   Abdominal:      General: Bowel sounds are normal.      Palpations: Abdomen is soft.      Tenderness: There is abdominal tenderness.      Comments: No bleeding or cramping currently.   Musculoskeletal:         General: Normal range of motion.      Cervical back: Neck supple.   Skin:     General: Skin is warm and dry.      Capillary Refill: Capillary refill takes less than 2 seconds.    Neurological:      General: No focal deficit present.      Mental Status: She is alert and oriented to person, place, and time.   Psychiatric:         Mood and Affect: Mood normal.         Behavior: Behavior normal.      Comments: Feels anxiety is out of control having trouble sleeping and feel on edge due to all that has occurred.       No data recorded     No data recorded   No data recorded   Assessment & Plan  Anxiety  She is a longstanding history of some mild depression anxiety which she has treated with counseling and therapy but now with the recent complications from her surgery and the stress of her life it seems that additional treatment is likely necessary.  We discussed the pros and cons of starting a serotonin booster medicine I think she is an excellent candidate.  She is still breast-feeding but very minimally only once a day in the evening.  We discussed the fact that SSRI medicines can get into the breastmilk in very small amounts but there is no data showing any significant risk to the infant especially at the small volume that she is currently giving him.  At this point I do believe that the benefit by far outweighs any theoretical risk.  We will initiate a low-dose Lexapro 5 mg daily and follow-up in 1 month  Orders:    escitalopram (Lexapro) 5 mg tablet; Take 1 tablet (5 mg) by mouth once daily.    Follow Up In [facility] - PCP; Future    Vaginal bleeding  This 29-year-old female 3 weeks after her hysterectomy developed vaginal bleeding with clots.  Apparently she had a small tear in the vaginal cuff incision which was repaired with a single stitch by her gynecologic surgeon.  She has had no bleeding since and her hemoglobin did not drop during hospitalization.  She will keep routine follow-up with her GYN              Please excuse any errors in grammar or translation related to this dictation. Voice recognition software was utilized to prepare this document.

## 2025-04-18 NOTE — ASSESSMENT & PLAN NOTE
This 29-year-old female 3 weeks after her hysterectomy developed vaginal bleeding with clots.  Apparently she had a small tear in the vaginal cuff incision which was repaired with a single stitch by her gynecologic surgeon.  She has had no bleeding since and her hemoglobin did not drop during hospitalization.  She will keep routine follow-up with her GYN

## 2025-04-23 ENCOUNTER — PATIENT OUTREACH (OUTPATIENT)
Dept: PRIMARY CARE | Facility: CLINIC | Age: 30
End: 2025-04-23
Payer: COMMERCIAL

## 2025-05-01 ENCOUNTER — APPOINTMENT (OUTPATIENT)
Dept: OBSTETRICS AND GYNECOLOGY | Facility: CLINIC | Age: 30
End: 2025-05-01
Payer: COMMERCIAL

## 2025-05-01 DIAGNOSIS — N93.9 ABNORMAL UTERINE BLEEDING (AUB): Primary | ICD-10-CM

## 2025-05-01 PROCEDURE — 99024 POSTOP FOLLOW-UP VISIT: CPT | Performed by: OBSTETRICS & GYNECOLOGY

## 2025-05-01 NOTE — PROGRESS NOTES
GYN PROGRESS NOTE          Chief complaint: follow up    HPI:  Patient answers are not available for this visit.  HPI    Doing better no bleeding   Still has started lexapro  Pain is random feeling better    Last edited by Deborah Polanco MA on 5/1/2025  1:34 PM.          Reviewed case with patient, reviewed plans.    ROS:  GEN - no fevers or chills  RESP - no SOB or cough  GYN - see HPI      HISTORY:  Medical History[1]  Surgical History[2]  Social History     Socioeconomic History    Marital status: Single     Spouse name: Jem Rodriguez    Number of children: Not on file    Years of education: Not on file    Highest education level: Not on file   Occupational History    Occupation: Doctor's    Tobacco Use    Smoking status: Never    Smokeless tobacco: Never   Vaping Use    Vaping status: Never Used   Substance and Sexual Activity    Alcohol use: Not Currently    Drug use: Never    Sexual activity: Yes     Partners: Male     Birth control/protection: Other, None     Comment: Depo injection   Other Topics Concern    Not on file   Social History Narrative    Not on file     Social Drivers of Health     Financial Resource Strain: Low Risk  (10/24/2024)    Overall Financial Resource Strain (CARDIA)     Difficulty of Paying Living Expenses: Not very hard   Food Insecurity: No Food Insecurity (10/24/2024)    Hunger Vital Sign     Worried About Running Out of Food in the Last Year: Never true     Ran Out of Food in the Last Year: Never true   Transportation Needs: No Transportation Needs (10/24/2024)    PRAPARE - Transportation     Lack of Transportation (Medical): No     Lack of Transportation (Non-Medical): No   Physical Activity: Sufficiently Active (10/18/2023)    Exercise Vital Sign     Days of Exercise per Week: 5 days     Minutes of Exercise per Session: 30 min   Stress: No Stress Concern Present (10/24/2024)    [place] [employer]ational [employer] - Occupational Stress Questionnaire     Feeling of  Stress : Not at all   Social Connections: Moderately Isolated (10/18/2023)    Social Connection and Isolation Panel [NHANES]     Frequency of Communication with Friends and Family: More than three times a week     Frequency of Social Gatherings with Friends and Family: More than three times a week     Attends Yazdanism Services: Never     Active Member of Clubs or Organizations: No     Attends Club or Organization Meetings: Never     Marital Status: Living with partner   Intimate Partner Violence: Not At Risk (10/24/2024)    Humiliation, Afraid, Rape, and Kick questionnaire     Fear of Current or Ex-Partner: No     Emotionally Abused: No     Physically Abused: No     Sexually Abused: No   Housing Stability: Low Risk  (10/24/2024)    Housing Stability Vital Sign     Unable to Pay for Housing in the Last Year: No     Number of Times Moved in the Last Year: 0     Homeless in the Last Year: No     Cancer-related family history is not on file.       PHYSICAL EXAM:  LMP  (LMP Unknown)   GEN:  A&O, NAD  HEENT:  head HC/AT, no visible goiter  PSYCH:  normal affect, non-anxious      IMPRESSION/PLAN:    29-year-old status post TLH and sling doing well    Released from postoperative care return to normal activity        Imer Merino MD           [1]   Past Medical History:  Diagnosis Date    Abnormal uterine bleeding (AUB)     Anxiety     COVID-19     NOT VACCINATED    Depression     Elevated blood pressure reading     Migraine     Postoperative wound infection 10/26/24   [2]   Past Surgical History:  Procedure Laterality Date    APPENDECTOMY  10/24/2024     SECTION, LOW TRANSVERSE N/A 10/19/2023    LAPAROSCOPIC HYSTERECTOMY  2025    PELVIC LAPAROSCOPY  2024    IUD ressection after abnormal migration    SALPINGECTOMY Bilateral 2025    TONSILECTOMY, ADENOIDECTOMY, BILATERAL MYRINGOTOMY AND TUBES      URETHRAL SLING  2025    suburethreal sling

## 2025-05-19 ENCOUNTER — APPOINTMENT (OUTPATIENT)
Dept: PRIMARY CARE | Facility: CLINIC | Age: 30
End: 2025-05-19
Payer: COMMERCIAL

## 2025-05-22 ENCOUNTER — OFFICE VISIT (OUTPATIENT)
Dept: PRIMARY CARE | Facility: CLINIC | Age: 30
End: 2025-05-22
Payer: COMMERCIAL

## 2025-05-22 VITALS
DIASTOLIC BLOOD PRESSURE: 68 MMHG | HEART RATE: 70 BPM | TEMPERATURE: 97.9 F | WEIGHT: 190 LBS | BODY MASS INDEX: 31.65 KG/M2 | RESPIRATION RATE: 16 BRPM | HEIGHT: 65 IN | SYSTOLIC BLOOD PRESSURE: 106 MMHG

## 2025-05-22 DIAGNOSIS — F41.9 ANXIETY: ICD-10-CM

## 2025-05-22 PROCEDURE — 3008F BODY MASS INDEX DOCD: CPT | Performed by: FAMILY MEDICINE

## 2025-05-22 PROCEDURE — 99212 OFFICE O/P EST SF 10 MIN: CPT | Performed by: FAMILY MEDICINE

## 2025-05-22 ASSESSMENT — ANXIETY QUESTIONNAIRES
6. BECOMING EASILY ANNOYED OR IRRITABLE: SEVERAL DAYS
GAD7 TOTAL SCORE: 11
7. FEELING AFRAID AS IF SOMETHING AWFUL MIGHT HAPPEN: NOT AT ALL
4. TROUBLE RELAXING: MORE THAN HALF THE DAYS
3. WORRYING TOO MUCH ABOUT DIFFERENT THINGS: NEARLY EVERY DAY
5. BEING SO RESTLESS THAT IT IS HARD TO SIT STILL: NEARLY EVERY DAY
2. NOT BEING ABLE TO STOP OR CONTROL WORRYING: SEVERAL DAYS
1. FEELING NERVOUS, ANXIOUS, OR ON EDGE: SEVERAL DAYS

## 2025-05-22 ASSESSMENT — ENCOUNTER SYMPTOMS: NERVOUS/ANXIOUS: 1

## 2025-05-22 NOTE — PROGRESS NOTES
Subjective   Rosalva Pineda is a 29 y.o. female who presents for Anxiety.     Anxiety  Presents for follow-up visit. Symptoms include excessive worry and nervous/anxious behavior. Symptoms occur most days. The severity of symptoms is mild.     Compliance with medications is %.            Visit Vitals  /68   Pulse 70   Temp 36.6 °C (97.9 °F)   Resp 16      Objective   Physical Exam  Constitutional:       Appearance: Normal appearance.   HENT:      Head: Normocephalic.   Pulmonary:      Effort: Pulmonary effort is normal.   Musculoskeletal:      Cervical back: Neck supple.   Skin:     General: Skin is warm and dry.   Psychiatric:         Mood and Affect: Mood normal.       JANEE-7 Total Score: 11 (5/22/2025  3:24 PM)     No data recorded   No data recorded   Assessment & Plan  Anxiety  Since initiating the Lexapro 5 mg she feels significantly less anxious.  She still worries about many different things but feels she has more control over her worry.  She feels like she is able to control her worry at home and at work.  Her JANEE-7 score today is 11.  She is not describing any side effects so I recommend we continue the current treatment with Lexapro for 3 to 4 months and then follow-up to consider discontinuing if symptoms are well-controlled or continuing for a bit longer.  Orders:    Follow Up In [facility] - PCP    Follow Up In [facility] - PCP; Future           Please excuse any errors in grammar or translation related to this dictation. Voice recognition software was utilized to prepare this document.

## 2025-05-23 ENCOUNTER — APPOINTMENT (OUTPATIENT)
Dept: PRIMARY CARE | Facility: CLINIC | Age: 30
End: 2025-05-23
Payer: COMMERCIAL

## 2025-07-15 ENCOUNTER — OFFICE VISIT (OUTPATIENT)
Dept: PRIMARY CARE | Facility: CLINIC | Age: 30
End: 2025-07-15
Payer: COMMERCIAL

## 2025-07-15 VITALS
RESPIRATION RATE: 16 BRPM | WEIGHT: 190 LBS | TEMPERATURE: 99.8 F | SYSTOLIC BLOOD PRESSURE: 118 MMHG | HEIGHT: 65 IN | BODY MASS INDEX: 31.65 KG/M2 | HEART RATE: 94 BPM | DIASTOLIC BLOOD PRESSURE: 84 MMHG

## 2025-07-15 DIAGNOSIS — J32.9 SINUSITIS, BACTERIAL: Primary | ICD-10-CM

## 2025-07-15 DIAGNOSIS — B96.89 SINUSITIS, BACTERIAL: Primary | ICD-10-CM

## 2025-07-15 PROCEDURE — 3008F BODY MASS INDEX DOCD: CPT | Performed by: FAMILY MEDICINE

## 2025-07-15 PROCEDURE — 1036F TOBACCO NON-USER: CPT | Performed by: FAMILY MEDICINE

## 2025-07-15 PROCEDURE — 99214 OFFICE O/P EST MOD 30 MIN: CPT | Performed by: FAMILY MEDICINE

## 2025-07-15 RX ORDER — AMOXICILLIN AND CLAVULANATE POTASSIUM 875; 125 MG/1; MG/1
1 TABLET, FILM COATED ORAL 2 TIMES DAILY
Qty: 14 TABLET | Refills: 0 | Status: SHIPPED | OUTPATIENT
Start: 2025-07-15 | End: 2025-07-22

## 2025-07-15 ASSESSMENT — PATIENT HEALTH QUESTIONNAIRE - PHQ9
2. FEELING DOWN, DEPRESSED OR HOPELESS: NOT AT ALL
1. LITTLE INTEREST OR PLEASURE IN DOING THINGS: NOT AT ALL
SUM OF ALL RESPONSES TO PHQ9 QUESTIONS 1 AND 2: 0

## 2025-07-15 ASSESSMENT — ENCOUNTER SYMPTOMS: RHINORRHEA: 1

## 2025-07-15 NOTE — PROGRESS NOTES
Subjective   Rosalva Pineda is a 29 y.o. female who presents for Earache.     Earache   There is pain in the right ear. The current episode started yesterday. Associated symptoms include rhinorrhea. Associated symptoms comments: Ear fullness, right sided facial pain.            Review of Systems   HENT:  Positive for ear pain and rhinorrhea.       Visit Vitals  /84   Pulse 94   Temp 37.7 °C (99.8 °F)   Resp 16      Objective   Physical Exam  Constitutional:       Appearance: Normal appearance.   HENT:      Head: Normocephalic.      Right Ear: Tympanic membrane, ear canal and external ear normal.      Left Ear: Tympanic membrane, ear canal and external ear normal.      Nose: Congestion present.      Comments: The right-sided turbinates are extremely swollen and touching the septum.  They are erythematous with a thick amount of clear to yellowish discharge.    The right cheek is noticeably swollen and there is tenderness over the right maxillary sinus.     Mouth/Throat:      Mouth: Mucous membranes are moist.      Comments: Teeth appear to be normal.  There is no gum erythema or swelling or lesion.  Eyes:      Conjunctiva/sclera: Conjunctivae normal.   Cardiovascular:      Rate and Rhythm: Normal rate and regular rhythm.   Pulmonary:      Effort: Pulmonary effort is normal.      Breath sounds: Normal breath sounds.   Skin:     General: Skin is warm.      Findings: No rash.   Neurological:      Mental Status: She is alert.   Psychiatric:         Mood and Affect: Mood normal.       No data recorded     No data recorded   No data recorded   Assessment & Plan  Sinusitis, bacterial  This 29-year-old female has a short history of right maxillary and ear tenderness.  The ear exam is completely normal excluding otitis externa or media.  Her symptoms are highly consistent with a right maxillary sinusitis.  Although the timing is very brief there is noticeable cheek swelling and severe turbinate swelling indicating that  this infection has probably been there for longer than a few days.  Additionally, she has a low-grade temperature today indicating a much stronger possibility of a bacterial infection.  I am recommending we treat with Augmentin to cover bacterial sinusitis.  She has a documented history of Ceclor but has tolerated penicillin medications in the past  Orders:    amoxicillin-clavulanate (Augmentin) 875-125 mg tablet; Take 1 tablet by mouth 2 times a day for 7 days.           Please excuse any errors in grammar or translation related to this dictation. Voice recognition software was utilized to prepare this document.

## 2025-07-16 ENCOUNTER — OFFICE VISIT (OUTPATIENT)
Dept: OBSTETRICS AND GYNECOLOGY | Facility: CLINIC | Age: 30
End: 2025-07-16
Payer: COMMERCIAL

## 2025-07-16 VITALS
SYSTOLIC BLOOD PRESSURE: 104 MMHG | HEIGHT: 65 IN | DIASTOLIC BLOOD PRESSURE: 68 MMHG | BODY MASS INDEX: 31.65 KG/M2 | WEIGHT: 190 LBS

## 2025-07-16 DIAGNOSIS — O91.23 MASTITIS ASSOCIATED WITH LACTATION: Primary | ICD-10-CM

## 2025-07-16 PROCEDURE — 3008F BODY MASS INDEX DOCD: CPT | Performed by: ADVANCED PRACTICE MIDWIFE

## 2025-07-16 PROCEDURE — 99213 OFFICE O/P EST LOW 20 MIN: CPT | Performed by: ADVANCED PRACTICE MIDWIFE

## 2025-07-16 RX ORDER — DICLOXACILLIN SODIUM 500 MG/1
500 CAPSULE ORAL 4 TIMES DAILY
Qty: 40 CAPSULE | Refills: 0 | Status: SHIPPED | OUTPATIENT
Start: 2025-07-16 | End: 2025-07-26

## 2025-07-16 NOTE — PROGRESS NOTES
"GYNECOLOGY PROGRESS NOTE        CC:   see below. Breastfeeding her 2 year old at night only. Unsure if she is completely emptying her breasts. She thought she has a sinus infection but then she noticed the redness and pain along her Left breast. She was started on Augmentin.   Chief Complaint   Patient presents with    Breast Issues     Patient is here today for concern of mastitis. Left breast is red, swollen, painful with lines across breast. Patient is still breastfeeding but has not pumped since 2024. Was running a low grade fever of 99.8. Is being treated for a sinus infection.           HPI:  Rosalva Pineda is here with new complaint of breast pain    ROS:  GYN - see HPI  DERM - see HPI      HISTORY:  Past Surgical History:  10/24/2024: APPENDECTOMY  10/19/2023:  SECTION, LOW TRANSVERSE; N/A  2025: LAPAROSCOPIC HYSTERECTOMY  2024: PELVIC LAPAROSCOPY      Comment:  IUD ressection after abnormal migration  2025: SALPINGECTOMY; Bilateral  No date: TONSILECTOMY, ADENOIDECTOMY, BILATERAL MYRINGOTOMY AND TUBES  2025: URETHRAL SLING      Comment:  suburethreal sling  Cancer-related family history is not on file.       PHYSICAL EXAM:  /68 (BP Location: Right arm, Patient Position: Sitting)   Ht 1.651 m (5' 5\")   Wt 86.2 kg (190 lb)   LMP  (LMP Unknown)   BMI 31.62 kg/m²   GEN:  A&O, NAD  BREAST:    -RT:  no masses or TTP, no skin lesions   -LT:   redness along the top of her breast and a circular area noted along the inner upper aspect of the breast. A firm area noted within the circular area. Tender to touch and red in appearance.  Physical Exam  Genitourinary:      Genitourinary Comments: Redness noted   Breasts:     Left: Skin change and tenderness present.   Chest:          LYMPH:  no axillary or supraclavicular lymphadenopathy  PSYCH:  normal affect, non-anxious      IMPRESSION/PLAN:  A: breast infection. Redness, tenderness, and warmth noted to Left breast. Breastfeeds " only at night trying to wean her child.  Plan: 1. Rx sent for Dicloxacillin 500mg QID x 10days. Patient has used this rx and other PCN products without issues. 2. Hold the Augmentin Rx and start  Dicloxacillin, if her symptoms don't improve with this Rx then start the Augmentin again.   Problem List Items Addressed This Visit    None           KUN Cummings-DIXON

## 2025-09-18 ENCOUNTER — APPOINTMENT (OUTPATIENT)
Dept: PRIMARY CARE | Facility: CLINIC | Age: 30
End: 2025-09-18
Payer: COMMERCIAL

## 2026-02-24 ENCOUNTER — APPOINTMENT (OUTPATIENT)
Dept: PRIMARY CARE | Facility: CLINIC | Age: 31
End: 2026-02-24
Payer: COMMERCIAL

## 2026-02-27 ENCOUNTER — APPOINTMENT (OUTPATIENT)
Dept: PRIMARY CARE | Facility: CLINIC | Age: 31
End: 2026-02-27
Payer: COMMERCIAL

## (undated) DEVICE — POSITIONING SYSTEM, PAGAZZI, PATIENT

## (undated) DEVICE — SLEEVE, SCD EXPRESS, KNEE LENGTH-MEDIUM

## (undated) DEVICE — GLOVE, SURGICAL, PROTEXIS PI , 7.5, PF, LF

## (undated) DEVICE — SOLUTION, IRRIGATION, USP, SODIUM CHLORIDE 0.9%, .9 NACL, .9NS, 500 ML

## (undated) DEVICE — STRIP, SKIN CLOSURE, STERI STRIP, REINFORCED, 0.5 X 4 IN

## (undated) DEVICE — TROCAR SYSTEM, BALLOON, KII GELPORT, 12 X 100MM

## (undated) DEVICE — LUBRICANT, SURGILUBE, STERILE, 2OZ

## (undated) DEVICE — APPLICATOR, CHLORAPREP, W/ORANGE TINT, 26ML

## (undated) DEVICE — MANIFOLD, 4 PORT NEPTUNE STANDARD

## (undated) DEVICE — MANIPULATOR, UTERINE, ARCH KOH EFFICIENT, 3.5CM

## (undated) DEVICE — PAD, SANITARY, OBSTETRICAL, W/ADHSV STRIP,11 IN,LF

## (undated) DEVICE — NEEDLE, SPINAL, 22 G X 3.5 IN, BLACK HUB

## (undated) DEVICE — TIP, RUMI BLUE 6.7MM X 8CM

## (undated) DEVICE — STAPLER, ECHELON 3000, 45MM STD

## (undated) DEVICE — RETRACTOR RING, APS, PLASTIC, 16.6CM X 16.2CM

## (undated) DEVICE — SYRINGE, 50 CC, LUER LOCK

## (undated) DEVICE — SOLUTION, IRRIGATION, USP, SODIUM CHLORIDE 0.9%, 3000 ML

## (undated) DEVICE — GOWN, SURGICAL, ROYAL SILK, XL, STERILE

## (undated) DEVICE — DRAPE, LEGGINGS, 28.5 X 43 IN, DISPOSABLE, LF, STERILE

## (undated) DEVICE — SYRINGE, CONTROL, ANGIOGRAPHIC, FIXED MALE LUER, 10 CC

## (undated) DEVICE — DRAPE PACK, LAVH, W/ATTACHED LEGGINGS, W/POUCH, 100 X 114 IN, LF, STERILE

## (undated) DEVICE — Device

## (undated) DEVICE — GOWN, SURGICAL, IMPLT, BACK, XLARGE, XLONG, STERILE

## (undated) DEVICE — STRAP, VELCRO, BODY, 4 X 60IN, NS

## (undated) DEVICE — SUTURE, VICRYL, 0, 27 IN, UR-6, VIOLET

## (undated) DEVICE — SOLUTION, IRRIGATION, USP, STERILE WATER, 500ML, BOTTLE

## (undated) DEVICE — SYSTEM, TROCAR LAP, 5X100MM, SHIELD BLADED, KII ADVANCED FIX ABDOMINAL

## (undated) DEVICE — SUTURE, MONOCRYL, 4-0, 27 IN, PS-2, UNDYED

## (undated) DEVICE — CAUTERY, PENCIL, PUSH BUTTON, SMOKE EVAC, 70MM

## (undated) DEVICE — GLOVE, SURGICAL, PROTEXIS PI BLUE W/NEUTHERA, 6.5, PF, LF

## (undated) DEVICE — SUTURE, PROLENE, 2-0, 30 IN, KS, BLUE

## (undated) DEVICE — PUMP, STRYKERFLOW 2 & HANDPIECE W/10FT. IRRIGATION TUBING

## (undated) DEVICE — SOLUTION KIT, ANTIFOG, 6CC, LF

## (undated) DEVICE — COVER, BACK TABLE

## (undated) DEVICE — TRAY, DRY PREP, PREMIUM

## (undated) DEVICE — TOWEL PACK, STERILE, 16X24, XRAY DETECTABLE, BLUE, 4/PK

## (undated) DEVICE — SWABSTICK, PREP, IODOPHOR, PVP, 8 IN

## (undated) DEVICE — TRAY, SURESTEP, SILICONE DRAINAGE BAG, STATLOCK, 16FR

## (undated) DEVICE — SUTURE, RELOAD, ENDOSTITCH 0, V-LOC 6IN  X 15CM

## (undated) DEVICE — CAP, BABY, 4 X 6 IN, PINK/BLUE/WHITE

## (undated) DEVICE — DRAPE, SHEET, LAPAROTOMY, W/ISO-BAC, W/ARMBOARD COVERS, 98 X 122 IN, DISPOSABLE, LF, STERILE

## (undated) DEVICE — SUTURE, VICRYL, 4-0, 18 IN, UNDYED BR PS-2

## (undated) DEVICE — SUTURE, VICRYL, 3-0, 27 IN, SH

## (undated) DEVICE — WARMER, DUAL SCOPE

## (undated) DEVICE — GOWN, SURGICAL, ROYAL SILK, LG, STERILE

## (undated) DEVICE — LIGASURE, L-HOOK 44CM SEALER/DIVIDER LAP, MARYLAND

## (undated) DEVICE — DRAPE, POUCH, IRRIGATION, 20 X 24, W/FILTER DRAINAGE

## (undated) DEVICE — NEEDLE, SAFETY, 25 GA X 1.5 IN

## (undated) DEVICE — PREP, SCRUB, SKIN, FOAM, HIBICLENS, 4 OZ

## (undated) DEVICE — SYSTEM, SMOKE EVAC, SEECLEAR XCL, 8.0 LITER, LF

## (undated) DEVICE — GLOVE, SURGICAL, PROTEXIS PI , 6.0, PF, LF

## (undated) DEVICE — ELECTRODE, HF, ESG PLASMA LOOP-MEDIUM 30 DEG

## (undated) DEVICE — STRAP, ARM BOARD, 32 X 1.5

## (undated) DEVICE — HOLSTER, JET SAFETY

## (undated) DEVICE — ACCESSORY, FLUID MANAGEMENT, AVETA

## (undated) DEVICE — TUBING, INSUFFLATION, LAPAROSCOPIC, FILTER, 10 FT

## (undated) DEVICE — DRAPE, SHEET, XL

## (undated) DEVICE — SET, SPRAY, TISSEAL EASYSPRAY

## (undated) DEVICE — SUTURE, STRATAFIX 0, SPIRAL PDS PLUS, 9IN V-34 36CM

## (undated) DEVICE — TOWEL PACK 10-PK

## (undated) DEVICE — IRRIGATION SET, CYSTOSCOPY, REGULATING CLAMP, STRAIGHT, 81 IN

## (undated) DEVICE — SUTURE, VICRYL PLUS 3-0, SH, 27IN

## (undated) DEVICE — MASK, SURGICAL, STANDARD, THE LITE ONE, BLUE

## (undated) DEVICE — LIGASURE, SEALER/DIVIDER MARYLAND JAW, 5MM

## (undated) DEVICE — STRIP, SKIN CLOSURE, COMPOUND BENZION TINCTURE 0.6ML

## (undated) DEVICE — BOWL, BASIN, 32 OZ, STERILE

## (undated) DEVICE — STAPLER, ENDO ECHELON 45MM RELOAD, WHITE, REUSABLE

## (undated) DEVICE — DEVICE, SUTURE, ENDOSTITCH, 10 MM

## (undated) DEVICE — PROTECTOR, NERVE, ULNAR, PINK

## (undated) DEVICE — COVER HANDLE LIGHT, STERIS, BLUE, STERILE

## (undated) DEVICE — ELECTRODE, ELECTROSURGICAL, BLADE, INSULATED, ENT/IMA, STERILE

## (undated) DEVICE — RETRIEVAL SYSTEM, MONARCH, 10MM DISP ENDOSCOPIC

## (undated) DEVICE — MASK, SURGICAL, SPLASHGUARD, THE PROTECTOR, FOG FREE, BLUE/ORANGE

## (undated) DEVICE — SUTURE, VICRYL PLUS, 0, 27IN, UR-6, VIOLET, BRAIDED

## (undated) DEVICE — DEVICE, GELPOINT, SINGLE PORT

## (undated) DEVICE — SYSTEM, FIOS FIRST ENTRY, 5 X 100MM, KII ADVANCED FIXATION

## (undated) DEVICE — ADHESIVE, SKIN, DERMABOND ADVANCED, 15CM, PEN-STYLE

## (undated) DEVICE — ACCESSORY, AVETA, WASTE MANAGEMENT WITH CAP